# Patient Record
Sex: FEMALE | Race: ASIAN | NOT HISPANIC OR LATINO | Employment: UNEMPLOYED | URBAN - METROPOLITAN AREA
[De-identification: names, ages, dates, MRNs, and addresses within clinical notes are randomized per-mention and may not be internally consistent; named-entity substitution may affect disease eponyms.]

---

## 2023-08-06 ENCOUNTER — HOSPITAL ENCOUNTER (EMERGENCY)
Facility: HOSPITAL | Age: 33
Discharge: HOME | End: 2023-08-06
Attending: EMERGENCY MEDICINE | Admitting: EMERGENCY MEDICINE

## 2023-08-06 ENCOUNTER — APPOINTMENT (EMERGENCY)
Dept: CT IMAGING | Facility: HOSPITAL | Age: 33
End: 2023-08-06

## 2023-08-06 ENCOUNTER — HOSPITAL ENCOUNTER (EMERGENCY)
Facility: HOSPITAL | Age: 33
End: 2023-08-07
Attending: EMERGENCY MEDICINE | Admitting: EMERGENCY MEDICINE

## 2023-08-06 VITALS
WEIGHT: 161.6 LBS | SYSTOLIC BLOOD PRESSURE: 111 MMHG | HEIGHT: 65 IN | TEMPERATURE: 98.3 F | DIASTOLIC BLOOD PRESSURE: 63 MMHG | HEART RATE: 83 BPM | RESPIRATION RATE: 19 BRPM | OXYGEN SATURATION: 100 % | BODY MASS INDEX: 26.92 KG/M2

## 2023-08-06 DIAGNOSIS — Z00.8 ENCOUNTER FOR PSYCHOLOGICAL EVALUATION: Primary | ICD-10-CM

## 2023-08-06 DIAGNOSIS — F29 PSYCHOSIS (HCC): Primary | ICD-10-CM

## 2023-08-06 LAB
ALBUMIN SERPL BCP-MCNC: 4.2 G/DL (ref 3.5–5)
ALP SERPL-CCNC: 36 U/L (ref 34–104)
ALT SERPL W P-5'-P-CCNC: 23 U/L (ref 7–52)
AMPHETAMINES SERPL QL SCN: NEGATIVE
AMPHETAMINES SERPL QL SCN: NEGATIVE
ANION GAP SERPL CALCULATED.3IONS-SCNC: 6 MMOL/L
APAP SERPL-MCNC: <10 UG/ML (ref 10–20)
AST SERPL W P-5'-P-CCNC: 26 U/L (ref 13–39)
ATRIAL RATE: 73 BPM
BARBITURATES UR QL: NEGATIVE
BARBITURATES UR QL: NEGATIVE
BASOPHILS # BLD AUTO: 0.02 THOUSANDS/ÂΜL (ref 0–0.1)
BASOPHILS NFR BLD AUTO: 0 % (ref 0–1)
BENZODIAZ UR QL: NEGATIVE
BENZODIAZ UR QL: NEGATIVE
BILIRUB SERPL-MCNC: 0.84 MG/DL (ref 0.2–1)
BILIRUB UR QL STRIP: NEGATIVE
BUN SERPL-MCNC: 3 MG/DL (ref 5–25)
CALCIUM SERPL-MCNC: 8.6 MG/DL (ref 8.4–10.2)
CHLORIDE SERPL-SCNC: 109 MMOL/L (ref 96–108)
CLARITY UR: CLEAR
CO2 SERPL-SCNC: 25 MMOL/L (ref 21–32)
COCAINE UR QL: NEGATIVE
COCAINE UR QL: NEGATIVE
COLOR UR: COLORLESS
CREAT SERPL-MCNC: 0.87 MG/DL (ref 0.6–1.3)
EOSINOPHIL # BLD AUTO: 0.02 THOUSAND/ÂΜL (ref 0–0.61)
EOSINOPHIL NFR BLD AUTO: 0 % (ref 0–6)
ERYTHROCYTE [DISTWIDTH] IN BLOOD BY AUTOMATED COUNT: 14.1 % (ref 11.6–15.1)
ETHANOL EXG-MCNC: 0 MG/DL
ETHANOL SERPL-MCNC: <10 MG/DL
EXT PREGNANCY TEST URINE: NEGATIVE
EXT PREGNANCY TEST URINE: NEGATIVE
EXT. CONTROL: NORMAL
EXT. CONTROL: NORMAL
GFR SERPL CREATININE-BSD FRML MDRD: 88 ML/MIN/1.73SQ M
GLUCOSE SERPL-MCNC: 103 MG/DL (ref 65–140)
GLUCOSE UR STRIP-MCNC: NEGATIVE MG/DL
HCT VFR BLD AUTO: 37.9 % (ref 34.8–46.1)
HGB BLD-MCNC: 11.9 G/DL (ref 11.5–15.4)
HGB UR QL STRIP.AUTO: NEGATIVE
IMM GRANULOCYTES # BLD AUTO: 0.02 THOUSAND/UL (ref 0–0.2)
IMM GRANULOCYTES NFR BLD AUTO: 0 % (ref 0–2)
KETONES UR STRIP-MCNC: ABNORMAL MG/DL
LEUKOCYTE ESTERASE UR QL STRIP: NEGATIVE
LYMPHOCYTES # BLD AUTO: 1.69 THOUSANDS/ÂΜL (ref 0.6–4.47)
LYMPHOCYTES NFR BLD AUTO: 29 % (ref 14–44)
MCH RBC QN AUTO: 26.9 PG (ref 26.8–34.3)
MCHC RBC AUTO-ENTMCNC: 31.4 G/DL (ref 31.4–37.4)
MCV RBC AUTO: 86 FL (ref 82–98)
METHADONE UR QL: NEGATIVE
METHADONE UR QL: NEGATIVE
MONOCYTES # BLD AUTO: 0.46 THOUSAND/ÂΜL (ref 0.17–1.22)
MONOCYTES NFR BLD AUTO: 8 % (ref 4–12)
NEUTROPHILS # BLD AUTO: 3.68 THOUSANDS/ÂΜL (ref 1.85–7.62)
NEUTS SEG NFR BLD AUTO: 63 % (ref 43–75)
NITRITE UR QL STRIP: NEGATIVE
NRBC BLD AUTO-RTO: 0 /100 WBCS
OPIATES UR QL SCN: NEGATIVE
OPIATES UR QL SCN: NEGATIVE
OXYCODONE+OXYMORPHONE UR QL SCN: NEGATIVE
OXYCODONE+OXYMORPHONE UR QL SCN: NEGATIVE
P AXIS: 77 DEGREES
PCP UR QL: NEGATIVE
PCP UR QL: NEGATIVE
PH UR STRIP.AUTO: 6 [PH]
PLATELET # BLD AUTO: 210 THOUSANDS/UL (ref 149–390)
PMV BLD AUTO: 9.4 FL (ref 8.9–12.7)
POTASSIUM SERPL-SCNC: 3.2 MMOL/L (ref 3.5–5.3)
PR INTERVAL: 158 MS
PROT SERPL-MCNC: 6.7 G/DL (ref 6.4–8.4)
PROT UR STRIP-MCNC: NEGATIVE MG/DL
QRS AXIS: 91 DEGREES
QRSD INTERVAL: 78 MS
QT INTERVAL: 410 MS
QTC INTERVAL: 451 MS
RBC # BLD AUTO: 4.42 MILLION/UL (ref 3.81–5.12)
SALICYLATES SERPL-MCNC: <5 MG/DL (ref 3–20)
SODIUM SERPL-SCNC: 140 MMOL/L (ref 135–147)
SP GR UR STRIP.AUTO: 1 (ref 1–1.03)
T WAVE AXIS: 75 DEGREES
THC UR QL: POSITIVE
THC UR QL: POSITIVE
UROBILINOGEN UR STRIP-ACNC: <2 MG/DL
VENTRICULAR RATE: 73 BPM
WBC # BLD AUTO: 5.89 THOUSAND/UL (ref 4.31–10.16)

## 2023-08-06 PROCEDURE — 80053 COMPREHEN METABOLIC PANEL: CPT | Performed by: EMERGENCY MEDICINE

## 2023-08-06 PROCEDURE — 93005 ELECTROCARDIOGRAM TRACING: CPT

## 2023-08-06 PROCEDURE — 70450 CT HEAD/BRAIN W/O DYE: CPT

## 2023-08-06 PROCEDURE — 99285 EMERGENCY DEPT VISIT HI MDM: CPT | Performed by: EMERGENCY MEDICINE

## 2023-08-06 PROCEDURE — 0241U HB NFCT DS VIR RESP RNA 4 TRGT: CPT | Performed by: EMERGENCY MEDICINE

## 2023-08-06 PROCEDURE — 82077 ASSAY SPEC XCP UR&BREATH IA: CPT | Performed by: EMERGENCY MEDICINE

## 2023-08-06 PROCEDURE — 82075 ASSAY OF BREATH ETHANOL: CPT | Performed by: EMERGENCY MEDICINE

## 2023-08-06 PROCEDURE — 36415 COLL VENOUS BLD VENIPUNCTURE: CPT | Performed by: EMERGENCY MEDICINE

## 2023-08-06 PROCEDURE — 81003 URINALYSIS AUTO W/O SCOPE: CPT | Performed by: EMERGENCY MEDICINE

## 2023-08-06 PROCEDURE — 99285 EMERGENCY DEPT VISIT HI MDM: CPT

## 2023-08-06 PROCEDURE — 81025 URINE PREGNANCY TEST: CPT | Performed by: EMERGENCY MEDICINE

## 2023-08-06 PROCEDURE — 96372 THER/PROPH/DIAG INJ SC/IM: CPT

## 2023-08-06 PROCEDURE — 99284 EMERGENCY DEPT VISIT MOD MDM: CPT

## 2023-08-06 PROCEDURE — 80179 DRUG ASSAY SALICYLATE: CPT | Performed by: EMERGENCY MEDICINE

## 2023-08-06 PROCEDURE — 80307 DRUG TEST PRSMV CHEM ANLYZR: CPT | Performed by: EMERGENCY MEDICINE

## 2023-08-06 PROCEDURE — 93010 ELECTROCARDIOGRAM REPORT: CPT | Performed by: INTERNAL MEDICINE

## 2023-08-06 PROCEDURE — 85025 COMPLETE CBC W/AUTO DIFF WBC: CPT | Performed by: EMERGENCY MEDICINE

## 2023-08-06 PROCEDURE — 80143 DRUG ASSAY ACETAMINOPHEN: CPT | Performed by: EMERGENCY MEDICINE

## 2023-08-06 RX ORDER — OLANZAPINE 10 MG/1
10 INJECTION, POWDER, LYOPHILIZED, FOR SOLUTION INTRAMUSCULAR ONCE
Status: COMPLETED | OUTPATIENT
Start: 2023-08-07 | End: 2023-08-06

## 2023-08-06 RX ORDER — LORAZEPAM 2 MG/ML
2 INJECTION INTRAMUSCULAR ONCE
Status: COMPLETED | OUTPATIENT
Start: 2023-08-06 | End: 2023-08-06

## 2023-08-06 RX ORDER — POTASSIUM CHLORIDE 20 MEQ/1
40 TABLET, EXTENDED RELEASE ORAL ONCE
Status: COMPLETED | OUTPATIENT
Start: 2023-08-06 | End: 2023-08-06

## 2023-08-06 RX ORDER — DIPHENHYDRAMINE HYDROCHLORIDE 50 MG/ML
50 INJECTION INTRAMUSCULAR; INTRAVENOUS ONCE
Status: COMPLETED | OUTPATIENT
Start: 2023-08-06 | End: 2023-08-06

## 2023-08-06 RX ORDER — WATER 1000 ML/1000ML
INJECTION, SOLUTION INTRAVENOUS
Status: COMPLETED
Start: 2023-08-06 | End: 2023-08-06

## 2023-08-06 RX ORDER — OLANZAPINE 5 MG/1
10 TABLET, ORALLY DISINTEGRATING ORAL ONCE
Status: DISCONTINUED | OUTPATIENT
Start: 2023-08-07 | End: 2023-08-07 | Stop reason: HOSPADM

## 2023-08-06 RX ORDER — LORAZEPAM 2 MG/ML
2 INJECTION INTRAMUSCULAR ONCE
Status: COMPLETED | OUTPATIENT
Start: 2023-08-07 | End: 2023-08-06

## 2023-08-06 RX ORDER — DIPHENHYDRAMINE HYDROCHLORIDE 50 MG/ML
50 INJECTION INTRAMUSCULAR; INTRAVENOUS ONCE
Status: DISCONTINUED | OUTPATIENT
Start: 2023-08-07 | End: 2023-08-07 | Stop reason: HOSPADM

## 2023-08-06 RX ORDER — HALOPERIDOL 5 MG/ML
5 INJECTION INTRAMUSCULAR ONCE
Status: COMPLETED | OUTPATIENT
Start: 2023-08-06 | End: 2023-08-06

## 2023-08-06 RX ADMIN — OLANZAPINE 10 MG: 10 INJECTION, POWDER, FOR SOLUTION INTRAMUSCULAR at 23:58

## 2023-08-06 RX ADMIN — POTASSIUM CHLORIDE 40 MEQ: 1500 TABLET, EXTENDED RELEASE ORAL at 08:05

## 2023-08-06 RX ADMIN — LORAZEPAM 2 MG: 2 INJECTION INTRAMUSCULAR; INTRAVENOUS at 23:58

## 2023-08-06 RX ADMIN — LORAZEPAM 2 MG: 2 INJECTION INTRAMUSCULAR; INTRAVENOUS at 05:33

## 2023-08-06 RX ADMIN — HALOPERIDOL LACTATE 5 MG: 5 INJECTION, SOLUTION INTRAMUSCULAR at 05:33

## 2023-08-06 RX ADMIN — DIPHENHYDRAMINE HYDROCHLORIDE 50 MG: 50 INJECTION, SOLUTION INTRAMUSCULAR; INTRAVENOUS at 05:33

## 2023-08-06 RX ADMIN — WATER 10 ML: 1 INJECTION, SOLUTION INTRAMUSCULAR; INTRAVENOUS; SUBCUTANEOUS at 23:58

## 2023-08-06 NOTE — LETTER
[address] [facility] [address]  Dept: [phone]      EMTALA TRANSFER CONSENT    NAME Nathalie Montilla                                         1990                              MRN [id number]    I have been informed of my rights regarding examination, treatment, and transfer   by Dr. Diana Stephen MD    Benefits: Continuity of care    Risks: Potential for delay in receiving treatment      Consent for Transfer:  I acknowledge that my medical condition has been evaluated and explained to me by the emergency department physician or other qualified medical person and/or my attending physician, who has recommended that I be transferred to the service of  Accepting Physician: Dr. Ava Dooley at [address] [facility] [address] Name, [address] [facility] [address] : [address] [place] [address], [address] [place] [address]. The above potential benefits of such transfer, the potential risks associated with such transfer, and the probable risks of not being transferred have been explained to me, and I fully understand them. The doctor has explained that, in my case, the benefits of transfer outweigh the risks. I agree to be transferred. I authorize the performance of emergency medical procedures and treatments upon me in both transit and upon arrival at the receiving facility. Additionally, I authorize the release of any and all medical records to the receiving facility and request they be transported with me, if possible. I understand that the safest mode of transportation during a medical emergency is an ambulance and that the Hospital advocates the use of this mode of transport. Risks of traveling to the receiving facility by car, including absence of medical control, life sustaining equipment, such as oxygen, and medical personnel has been explained to me and I fully understand them.     (JASPER CORRECT BOX BELOW)  [  ]  I consent to the stated transfer and to be transported by ambulance/helicopter. [  ]  I consent to the stated transfer, but refuse transportation by ambulance and accept full responsibility for my transportation by car. I understand the risks of non-ambulance transfers and I exonerate the Hospital and its staff from any deterioration in my condition that results from this refusal.    X___________________________________________    DATE  23  TIME________  Signature of patient or legally responsible individual signing on patient behalf           RELATIONSHIP TO PATIENT_________________________          Provider Certification    NAME Óscar Minors                                         1990                              MRN [id number]    A medical screening exam was performed on the above named patient. Based on the examination:    Condition Necessitating Transfer The encounter diagnosis was Encounter for psychological evaluation. Patient Condition: The patient has been stabilized such that within reasonable medical probability, no material deterioration of the patient condition or the condition of the unborn child(freedom) is likely to result from the transfer    Reason for Transfer: Level of Care needed not available at this facility    Transfer Requirements: [facility] [address] available and qualified personnel available for treatment as acknowledged by Miquel Adler [phone]  Agreed to accept transfer and to provide appropriate medical treatment as acknowledged by       Dr. Adam Vaughn  Appropriate medical records of the examination and treatment of the patient are provided at the time of transfer   [address] [place] [address] Drive _______  Transfer will be performed by qualified personnel from [facility]  and appropriate transfer equipment as required, including the use of necessary and appropriate life support measures.     Provider Certification: I have examined the patient and explained the following risks and benefits of being transferred/refusing transfer to the patient/family:  General risk, such as traffic hazards, adverse weather conditions, rough terrain or turbulence, possible failure of equipment (including vehicle or aircraft), or consequences of actions of persons outside the control of the transport personnel      Based on these reasonable risks and benefits to the patient and/or the unborn child(freedom), and based upon the information available at the time of the patient’s examination, I certify that the medical benefits reasonably to be expected from the provision of appropriate medical treatments at another medical facility outweigh the increasing risks, if any, to the individual’s medical condition, and in the case of labor to the unborn child, from effecting the transfer.     X____________________________________________ DATE 08/07/23        TIME_______      ORIGINAL - SEND TO MEDICAL RECORDS   COPY - SEND WITH PATIENT DURING TRANSFER

## 2023-08-06 NOTE — ED NOTES
Pt cooperative with medication administration. Pt denies any needs at this time. Room stripped of all unnecessary items. Monitoring equipment remains in place due to pt not being medically cleared as of yet. 1:1 initiated for safety.      Harley Villalobos RN  08/06/23 0718

## 2023-08-06 NOTE — ED NOTES
Pt has begun to scream at wall and gesture wildly. Pt yelled "I've been waiting for this moment, those f*ckers have been killing me." Upon further questioning pt refuses to answer any questions. Provider made aware.      Myron Fan RN  08/06/23 6333

## 2023-08-06 NOTE — ED NOTES
CW attempted to speak w/pt. Pt appears to be sleeping soundly at this time. CW notes, 1:1 noted, pt woke up briefly, requested water and went right back to sleep. CW requested to be advised if pt wakes up.     TDS, CW
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
## 2023-08-06 NOTE — ED NOTES
Crisis at bedside, pt "in and out" and is unable to evaluate at this time.  RN will TT crisis when patient is awake      Kris Cordova RN  08/06/23 0094

## 2023-08-06 NOTE — ED RE-EVALUATION NOTE
Sleeping  Vital signs stable  Medically cleared  Awaiting crisis evaluation and placement     Stacy Perales MD  08/06/23 9492

## 2023-08-06 NOTE — ED NOTES
Pt drowsy, but awake.  Requesting to "go home" RN reviewed plan of care with patient, administered Potassium and pt then stated she "just wanted to sleep." 1:1 observer remains at bedside      Yasmin Elizondo, [address] [place] [address]  08/06/23 1177

## 2023-08-06 NOTE — ED NOTES
This writer discussed the patients current presentation and recommended discharge plan with . They agree with the patient being discharged at this time with referrals and/or information about outpatient mental health providers, D&A treatment providers, and shelters. The patient was Instructed to follow up with their PCP. The patient was provided with referral information for:   Additional outpatient drug and alcohol providers and facilities. The patient declined to complete a safety plan however a blank plan was provided for future use. In addition, the patient was instructed to call local Psychiatric hospital crisis, other crisis services, [facility] or to go to the nearest ER immediately if their situation changes at any time. This writer discussed discharge plans with the patient, who agrees with and understands the discharge plans.        SAFETY PLAN  Warning Signs (thoughts, images, mood, behavior, situations) of a potential crisis: situations w/strangers       Coping Skills (what can I do to take my mind off the problem, or to keep myself safe): defer to a positive outlet and or activity      Outside Support (who can I reach out to for support and help): Psychiatric hospital crisis and or NA       National Suicide Prevention Hotline:  [address] [place] [address] Drive [address]: [address] [facility] [address]: [address] [place] [address] [address] [phone] - Crisis   [phone] - Peer Support Talk Line (1-9pm daily)  [phone] - Teen Support Talk Line (1-9pm daily)  [address] [place] [address] [address] [place] [address] [address] [place] [address] [address] [place] [address]) [phone] - [facility] [facility]

## 2023-08-06 NOTE — ED RE-EVALUATION NOTE
Patient is awake and alert now. Evaluated by crisis, no grounds for involuntary commitment no grounds for removing the patient's rights. Patient reports that she took 12 Gummies that she believes were THC and also smoked some marijuana. Patient does not remember any violent activity last night. The patient is alert awake and cogent with linear thinking currently. I have no grounds to remove the patient's rights at this time. This advised discharge at this time. Crisis gave follow-up to the patient.      Miryam Reynolds MD  08/06/23 5813

## 2023-08-06 NOTE — ED NOTES
CW attempted to speak w/pt. Pt appears drowsy and  continuously requires verbal prompts to engage in conversation. Pt states, "Arriving to the ED from a house" and then continues to speak in native language. Pt reports living alone at this time and only uses THC in the morning time. Pt denies any hx of MH illness but does report having a psychiatrist "a long long time ago in [place]. I keep wanting to make appointment but I don't do it." Pt denies having any current medications and or OP therapy. Pt denies any hx of medical issues. Pt denies SI's / HI's. Pt states, "No I never want to hurt myself or anyone else." Pt reports "seeing people at times but not sure if they are trying to hurt me." Pt denies auditory hallucinations. Pt cannot provide any details on legal issues. Pt states, "I have a lot." Pt denies use of tobacco products and or ETOH. CW will attempt to speak w/pt again in aprox an hour. Pt was administered medications earlier.     TDS, CW

## 2023-08-07 ENCOUNTER — HOSPITAL ENCOUNTER (INPATIENT)
Facility: HOSPITAL | Age: 33
LOS: 22 days | Discharge: HOME | DRG: 751 | End: 2023-08-29
Attending: PSYCHIATRY & NEUROLOGY | Admitting: PSYCHIATRY & NEUROLOGY
Payer: COMMERCIAL

## 2023-08-07 VITALS
DIASTOLIC BLOOD PRESSURE: 72 MMHG | SYSTOLIC BLOOD PRESSURE: 120 MMHG | RESPIRATION RATE: 18 BRPM | TEMPERATURE: 98.1 F | HEART RATE: 90 BPM | OXYGEN SATURATION: 100 %

## 2023-08-07 DIAGNOSIS — F29 PSYCHOSIS, UNSPECIFIED PSYCHOSIS TYPE (HCC): ICD-10-CM

## 2023-08-07 DIAGNOSIS — R79.89 LOW VITAMIN D LEVEL: Primary | ICD-10-CM

## 2023-08-07 DIAGNOSIS — Z00.8 ENCOUNTER FOR PSYCHOLOGICAL EVALUATION: ICD-10-CM

## 2023-08-07 DIAGNOSIS — G47.00 INSOMNIA, UNSPECIFIED TYPE: ICD-10-CM

## 2023-08-07 LAB
FLUAV RNA RESP QL NAA+PROBE: NEGATIVE
FLUBV RNA RESP QL NAA+PROBE: NEGATIVE
RSV RNA RESP QL NAA+PROBE: NEGATIVE
SARS-COV-2 RNA RESP QL NAA+PROBE: NEGATIVE

## 2023-08-07 PROCEDURE — 99285 EMERGENCY DEPT VISIT HI MDM: CPT | Performed by: EMERGENCY MEDICINE

## 2023-08-07 RX ORDER — TRAZODONE HYDROCHLORIDE 50 MG/1
50 TABLET ORAL
Status: DISCONTINUED | OUTPATIENT
Start: 2023-08-07 | End: 2023-08-18

## 2023-08-07 RX ORDER — HYDROXYZINE HYDROCHLORIDE 25 MG/1
100 TABLET, FILM COATED ORAL
Status: CANCELLED | OUTPATIENT
Start: 2023-08-07

## 2023-08-07 RX ORDER — ACETAMINOPHEN 325 MG/1
975 TABLET ORAL EVERY 6 HOURS PRN
Status: DISCONTINUED | OUTPATIENT
Start: 2023-08-07 | End: 2023-08-29 | Stop reason: HOSPADM

## 2023-08-07 RX ORDER — LORAZEPAM 2 MG/ML
2 INJECTION INTRAMUSCULAR
Status: DISCONTINUED | OUTPATIENT
Start: 2023-08-07 | End: 2023-08-29 | Stop reason: HOSPADM

## 2023-08-07 RX ORDER — POLYETHYLENE GLYCOL 3350 17 G/17G
17 POWDER, FOR SOLUTION ORAL DAILY PRN
Status: CANCELLED | OUTPATIENT
Start: 2023-08-07

## 2023-08-07 RX ORDER — ACETAMINOPHEN 325 MG/1
650 TABLET ORAL EVERY 4 HOURS PRN
Status: DISCONTINUED | OUTPATIENT
Start: 2023-08-07 | End: 2023-08-29 | Stop reason: HOSPADM

## 2023-08-07 RX ORDER — AMOXICILLIN 250 MG
1 CAPSULE ORAL DAILY PRN
Status: DISCONTINUED | OUTPATIENT
Start: 2023-08-07 | End: 2023-08-27

## 2023-08-07 RX ORDER — LORAZEPAM 2 MG/ML
2 INJECTION INTRAMUSCULAR
Status: CANCELLED | OUTPATIENT
Start: 2023-08-07

## 2023-08-07 RX ORDER — DIPHENHYDRAMINE HYDROCHLORIDE 50 MG/ML
50 INJECTION INTRAMUSCULAR; INTRAVENOUS EVERY 6 HOURS PRN
Status: CANCELLED | OUTPATIENT
Start: 2023-08-07

## 2023-08-07 RX ORDER — HYDROXYZINE HYDROCHLORIDE 25 MG/1
25 TABLET, FILM COATED ORAL
Status: DISCONTINUED | OUTPATIENT
Start: 2023-08-07 | End: 2023-08-29 | Stop reason: HOSPADM

## 2023-08-07 RX ORDER — HYDROXYZINE HYDROCHLORIDE 25 MG/1
50 TABLET, FILM COATED ORAL
Status: CANCELLED | OUTPATIENT
Start: 2023-08-07

## 2023-08-07 RX ORDER — LORAZEPAM 2 MG/ML
1 INJECTION INTRAMUSCULAR
Status: CANCELLED | OUTPATIENT
Start: 2023-08-07

## 2023-08-07 RX ORDER — BENZTROPINE MESYLATE 1 MG/ML
0.5 INJECTION INTRAMUSCULAR; INTRAVENOUS
Status: CANCELLED | OUTPATIENT
Start: 2023-08-07

## 2023-08-07 RX ORDER — LANOLIN ALCOHOL/MO/W.PET/CERES
3 CREAM (GRAM) TOPICAL
Status: DISCONTINUED | OUTPATIENT
Start: 2023-08-07 | End: 2023-08-14

## 2023-08-07 RX ORDER — BENZTROPINE MESYLATE 1 MG/ML
1 INJECTION INTRAMUSCULAR; INTRAVENOUS
Status: DISCONTINUED | OUTPATIENT
Start: 2023-08-07 | End: 2023-08-29 | Stop reason: HOSPADM

## 2023-08-07 RX ORDER — HYDROXYZINE 50 MG/1
50 TABLET, FILM COATED ORAL
Status: DISCONTINUED | OUTPATIENT
Start: 2023-08-07 | End: 2023-08-29 | Stop reason: HOSPADM

## 2023-08-07 RX ORDER — HALOPERIDOL 5 MG/ML
2.5 INJECTION INTRAMUSCULAR
Status: CANCELLED | OUTPATIENT
Start: 2023-08-07

## 2023-08-07 RX ORDER — ACETAMINOPHEN 325 MG/1
650 TABLET ORAL EVERY 6 HOURS PRN
Status: CANCELLED | OUTPATIENT
Start: 2023-08-07

## 2023-08-07 RX ORDER — BISACODYL 10 MG
10 SUPPOSITORY, RECTAL RECTAL DAILY PRN
Status: DISCONTINUED | OUTPATIENT
Start: 2023-08-07 | End: 2023-08-08

## 2023-08-07 RX ORDER — HALOPERIDOL 5 MG/ML
5 INJECTION INTRAMUSCULAR
Status: CANCELLED | OUTPATIENT
Start: 2023-08-07

## 2023-08-07 RX ORDER — MAGNESIUM HYDROXIDE/ALUMINUM HYDROXICE/SIMETHICONE 120; 1200; 1200 MG/30ML; MG/30ML; MG/30ML
30 SUSPENSION ORAL EVERY 4 HOURS PRN
Status: DISCONTINUED | OUTPATIENT
Start: 2023-08-07 | End: 2023-08-29 | Stop reason: HOSPADM

## 2023-08-07 RX ORDER — BENZTROPINE MESYLATE 1 MG/ML
0.5 INJECTION INTRAMUSCULAR; INTRAVENOUS
Status: DISCONTINUED | OUTPATIENT
Start: 2023-08-07 | End: 2023-08-29 | Stop reason: HOSPADM

## 2023-08-07 RX ORDER — POTASSIUM CHLORIDE 20 MEQ/1
40 TABLET, EXTENDED RELEASE ORAL ONCE
Status: COMPLETED | OUTPATIENT
Start: 2023-08-07 | End: 2023-08-07

## 2023-08-07 RX ORDER — HALOPERIDOL 1 MG/1
2.5 TABLET ORAL
Status: CANCELLED | OUTPATIENT
Start: 2023-08-07

## 2023-08-07 RX ORDER — HALOPERIDOL 1 MG/1
1 TABLET ORAL EVERY 6 HOURS PRN
Status: CANCELLED | OUTPATIENT
Start: 2023-08-07

## 2023-08-07 RX ORDER — POLYETHYLENE GLYCOL 3350 17 G/17G
17 POWDER, FOR SOLUTION ORAL DAILY PRN
Status: DISCONTINUED | OUTPATIENT
Start: 2023-08-07 | End: 2023-08-08

## 2023-08-07 RX ORDER — MAGNESIUM HYDROXIDE/ALUMINUM HYDROXICE/SIMETHICONE 120; 1200; 1200 MG/30ML; MG/30ML; MG/30ML
30 SUSPENSION ORAL EVERY 4 HOURS PRN
Status: CANCELLED | OUTPATIENT
Start: 2023-08-07

## 2023-08-07 RX ORDER — HALOPERIDOL 5 MG/ML
5 INJECTION INTRAMUSCULAR
Status: DISCONTINUED | OUTPATIENT
Start: 2023-08-07 | End: 2023-08-29 | Stop reason: HOSPADM

## 2023-08-07 RX ORDER — BISACODYL 10 MG
10 SUPPOSITORY, RECTAL RECTAL DAILY PRN
Status: CANCELLED | OUTPATIENT
Start: 2023-08-07

## 2023-08-07 RX ORDER — LORAZEPAM 2 MG/ML
2 INJECTION INTRAMUSCULAR EVERY 6 HOURS PRN
Status: DISCONTINUED | OUTPATIENT
Start: 2023-08-07 | End: 2023-08-29 | Stop reason: HOSPADM

## 2023-08-07 RX ORDER — AMOXICILLIN 250 MG
1 CAPSULE ORAL DAILY PRN
Status: CANCELLED | OUTPATIENT
Start: 2023-08-07

## 2023-08-07 RX ORDER — HALOPERIDOL 5 MG/1
5 TABLET ORAL
Status: DISCONTINUED | OUTPATIENT
Start: 2023-08-07 | End: 2023-08-29 | Stop reason: HOSPADM

## 2023-08-07 RX ORDER — ACETAMINOPHEN 325 MG/1
650 TABLET ORAL EVERY 4 HOURS PRN
Status: CANCELLED | OUTPATIENT
Start: 2023-08-07

## 2023-08-07 RX ORDER — HALOPERIDOL 1 MG/1
1 TABLET ORAL EVERY 6 HOURS PRN
Status: DISCONTINUED | OUTPATIENT
Start: 2023-08-07 | End: 2023-08-29 | Stop reason: HOSPADM

## 2023-08-07 RX ORDER — BENZTROPINE MESYLATE 1 MG/1
1 TABLET ORAL
Status: CANCELLED | OUTPATIENT
Start: 2023-08-07

## 2023-08-07 RX ORDER — HYDROXYZINE 50 MG/1
100 TABLET, FILM COATED ORAL
Status: DISCONTINUED | OUTPATIENT
Start: 2023-08-07 | End: 2023-08-29 | Stop reason: HOSPADM

## 2023-08-07 RX ORDER — DIPHENHYDRAMINE HYDROCHLORIDE 50 MG/ML
50 INJECTION INTRAMUSCULAR; INTRAVENOUS EVERY 6 HOURS PRN
Status: DISCONTINUED | OUTPATIENT
Start: 2023-08-07 | End: 2023-08-29 | Stop reason: HOSPADM

## 2023-08-07 RX ORDER — HALOPERIDOL 5 MG/1
5 TABLET ORAL
Status: CANCELLED | OUTPATIENT
Start: 2023-08-07

## 2023-08-07 RX ORDER — LORAZEPAM 2 MG/ML
1 INJECTION INTRAMUSCULAR
Status: DISCONTINUED | OUTPATIENT
Start: 2023-08-07 | End: 2023-08-29 | Stop reason: HOSPADM

## 2023-08-07 RX ORDER — ACETAMINOPHEN 325 MG/1
650 TABLET ORAL EVERY 6 HOURS PRN
Status: DISCONTINUED | OUTPATIENT
Start: 2023-08-07 | End: 2023-08-29 | Stop reason: HOSPADM

## 2023-08-07 RX ORDER — HYDROXYZINE HYDROCHLORIDE 25 MG/1
25 TABLET, FILM COATED ORAL
Status: CANCELLED | OUTPATIENT
Start: 2023-08-07

## 2023-08-07 RX ORDER — TRAZODONE HYDROCHLORIDE 50 MG/1
50 TABLET ORAL
Status: CANCELLED | OUTPATIENT
Start: 2023-08-07

## 2023-08-07 RX ORDER — BENZTROPINE MESYLATE 1 MG/1
1 TABLET ORAL
Status: DISCONTINUED | OUTPATIENT
Start: 2023-08-07 | End: 2023-08-29 | Stop reason: HOSPADM

## 2023-08-07 RX ORDER — LANOLIN ALCOHOL/MO/W.PET/CERES
3 CREAM (GRAM) TOPICAL
Status: CANCELLED | OUTPATIENT
Start: 2023-08-07

## 2023-08-07 RX ORDER — HALOPERIDOL 5 MG/ML
2.5 INJECTION INTRAMUSCULAR
Status: DISCONTINUED | OUTPATIENT
Start: 2023-08-07 | End: 2023-08-29 | Stop reason: HOSPADM

## 2023-08-07 RX ORDER — LORAZEPAM 2 MG/ML
2 INJECTION INTRAMUSCULAR EVERY 6 HOURS PRN
Status: CANCELLED | OUTPATIENT
Start: 2023-08-07

## 2023-08-07 RX ORDER — BENZTROPINE MESYLATE 1 MG/ML
1 INJECTION INTRAMUSCULAR; INTRAVENOUS
Status: CANCELLED | OUTPATIENT
Start: 2023-08-07

## 2023-08-07 RX ORDER — ACETAMINOPHEN 325 MG/1
975 TABLET ORAL EVERY 6 HOURS PRN
Status: CANCELLED | OUTPATIENT
Start: 2023-08-07

## 2023-08-07 RX ADMIN — POTASSIUM CHLORIDE 40 MEQ: 1500 TABLET, EXTENDED RELEASE ORAL at 20:22
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
## 2023-08-07 NOTE — ED NOTES
Per Jonas Lassiter:   The chart says "The patient is cleared for psychiatric evaluation and psychiatric admission. " We need a drAime note that states, pt is medically cleared for inpatient psy treatment, or behavioral health admission."    TT sent to attending to request specific note be entered in pt chart.

## 2023-08-07 NOTE — ED NOTES
Pt started to become agitated, screaming loudly and sobbing. Staff attempted to redirect with no success. Provider aware.      Carlos Gaines RN  08/07/23 3721
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
## 2023-08-07 NOTE — ED NOTES
TT sent to Toshia Phillips, to inquire if pt is being reviewed for in-network placement. Per [place], pt under review at [facility].

## 2023-08-07 NOTE — ED NOTES
CW received call from pt's niece, Elva Preston. Elva Preston indicates pt and niece speak w/each other just about every 2-3 days. The last time Elva Preston heard from [facility] was on Saturday and pt was afraid her phone was being tapped and someone was chasing her. Elva Preston reports pt has no known hx of MH illness dx but MH illness does run in the family. Elva Preston and pt's siblings were discussing if they should make a trip to [place] to pick pt up. They do not feel pt living alone is a good idea. Pt's niece alleges pt moved to [place] apro 3 yrs ago from [place]. Pt does not reside w/anyone. Elva Preston can be reached at: [phone]. CW did advise Dana this writer could not provide any details as pt has not provided verbal consent to do so. CW will speak w/pt to give pt message niece called, contact # if pt wishes to return call, and inquire on consent to share info. .    Diane Mcfadden attempted to speak w/pt. Pt is currently in the bathroom.     TDS, CW

## 2023-08-07 NOTE — ED PROVIDER NOTES
History  Chief Complaint   Patient presents with   • Psychiatric Evaluation     Brought in by police after being found laying in the middle of the road. Patient reports being afraid "Sy[place] people are coming to shoot me". Patient states that they "just want to give up and die" due to not being able to go back to [place], or stay in the [address] for undisclosed reasons. 80-year-old female presenting for psychiatric evaluation. Patient presents in police custody with the police 440 petitioned. Patient was recently discharged from this facility after bizarre behavior, it was felt to be related to drug use at that time. Patient states that she left the custody of our hospital and continue to use drugs, she made suicidal comments to the police but she is not denying however she is noted to be hallucinating, with persecutory delusions, and overall exhibiting psychosis. None       Past Medical History:   Diagnosis Date   • Substance abuse ([address])        History reviewed. No pertinent surgical history. History reviewed. No pertinent family history. I have reviewed and agree with the history as documented. E-Cigarette/Vaping   • E-Cigarette Use Never User      E-Cigarette/Vaping Substances     Social History     Tobacco Use   • Smoking status: Never   • Smokeless tobacco: Never   Vaping Use   • Vaping Use: Never used   Substance Use Topics   • Alcohol use: Not Currently   • Drug use: Yes     Types: Marijuana     Comment: Pt states, "I'm not really sure what was in those gummies"       Review of Systems   Unable to perform ROS: Psychiatric disorder       Physical Exam  Physical Exam  Constitutional:       General: She is not in acute distress. Appearance: She is well-developed. She is not diaphoretic. HENT:      Head: Normocephalic and atraumatic. Eyes:      Pupils: Pupils are equal, round, and reactive to light. Neck:      Vascular: No JVD. Trachea: No tracheal deviation.    Cardiovascular: Rate and Rhythm: Normal rate and regular rhythm. Heart sounds: Normal heart sounds. No murmur heard. No friction rub. No gallop. Pulmonary:      Effort: Pulmonary effort is normal. No respiratory distress. Breath sounds: Normal breath sounds. No wheezing or rales. Abdominal:      General: Bowel sounds are normal. There is no distension. Palpations: Abdomen is soft. Tenderness: There is no abdominal tenderness. There is no guarding or rebound. Skin:     General: Skin is warm and dry. Coloration: Skin is not pale. Neurological:      Mental Status: She is alert and oriented to person, place, and time. Cranial Nerves: No cranial nerve deficit. Motor: No abnormal muscle tone. Psychiatric:         Mood and Affect: Mood is anxious. Affect is labile. Speech: Speech is rapid and pressured. Behavior: Behavior is agitated. Thought Content: Thought content is paranoid and delusional.         Cognition and Memory: Cognition is impaired. Judgment: Judgment is impulsive.          Vital Signs  ED Triage Vitals [08/06/23 2236]   Temperature Pulse Respirations Blood Pressure SpO2   97.7 °F (36.5 °C) 89 18 117/83 100 %      Temp Source Heart Rate Source Patient Position - Orthostatic VS BP Location FiO2 (%)   Temporal Monitor Lying Left arm --      Pain Score       --           Vitals:    08/06/23 2236   BP: 117/83   Pulse: 89   Patient Position - Orthostatic VS: Lying         Visual Acuity  Visual Acuity    Flowsheet Row Most Recent Value   L Pupil Size (mm) 3   R Pupil Size (mm) 3   L Pupil Shape Round   R Pupil Shape Round          ED Medications  Medications   OLANZapine (ZyPREXA ZYDIS) dispersible tablet 10 mg (0 mg Oral Hold 8/7/23 0001)   diphenhydrAMINE (BENADRYL) injection 50 mg (0 mg Intramuscular Hold 8/7/23 0001)   OLANZapine (ZyPREXA) IM injection 10 mg (10 mg Intramuscular Given 8/6/23 6265)   sterile water injection **ADS Override Pull** (10 mL  Given 8/6/23 2358)   LORazepam (ATIVAN) injection 2 mg (2 mg Intramuscular Given 8/6/23 2358)       Diagnostic Studies  Results Reviewed     Procedure Component Value Units Date/Time    FLU/RSV/COVID - if FLU/RSV clinically relevant [[id number]]  (Normal) Collected: 08/06/23 2318    Lab Status: Final result Specimen: Nares from Nose Updated: 08/07/23 0009     SARS-CoV-2 Negative     INFLUENZA A PCR Negative     INFLUENZA B PCR Negative     RSV PCR Negative    Narrative:      FOR PEDIATRIC PATIENTS - copy/paste COVID Guidelines URL to browser: [email or web address]    SARS-CoV-2 assay is a Nucleic Acid Amplification assay intended for the  qualitative detection of nucleic acid from SARS-CoV-2 in nasopharyngeal  swabs. Results are for the presumptive identification of SARS-CoV-2 RNA. Positive results are indicative of infection with SARS-CoV-2, the virus  causing COVID-19, but do not rule out bacterial infection or co-infection  with other viruses. Laboratories within the [facility] and its  territories are required to report all positive results to the appropriate  public health authorities. Negative results do not preclude SARS-CoV-2  infection and should not be used as the sole basis for treatment or other  patient management decisions. Negative results must be combined with  clinical observations, patient history, and epidemiological information. This test has not been FDA cleared or approved. This test has been authorized by FDA under an Emergency Use Authorization  (EUA). This test is only authorized for the duration of time the  declaration that circumstances exist justifying the authorization of the  emergency use of an in vitro diagnostic tests for detection of SARS-CoV-2  virus and/or diagnosis of COVID-19 infection under section 564(b)(1) of  the Act, 21 U. S.C. 639PTF-6(U)(5), unless the authorization is terminated  or revoked sooner.  The test has been validated but independent review by FDA  and CLIA is pending. Test performed using [employer] GeneXpert: This RT-PCR assay targets N2,  a region unique to SARS-CoV-2. A conserved region in the E-gene was chosen  for pan-Sarbecovirus detection which includes SARS-CoV-2. According to CMS-2020-01-R, this platform meets the definition of high-throughput technology. Rapid drug screen, urine [[id number]]  (Abnormal) Collected: 08/06/23 2318    Lab Status: Final result Specimen: Urine, Clean Catch Updated: 08/06/23 2338     Amph/Meth UR Negative     Barbiturate Ur Negative     Benzodiazepine Urine Negative     Cocaine Urine Negative     Methadone Urine Negative     Opiate Urine Negative     PCP Ur Negative     THC Urine Positive     Oxycodone Urine Negative    Narrative:      Presumptive report. If requested, specimen will be sent to reference lab for confirmation. FOR MEDICAL PURPOSES ONLY. IF CONFIRMATION NEEDED PLEASE CONTACT THE LAB WITHIN 5 DAYS. Drug Screen Cutoff Levels:  AMPHETAMINE/METHAMPHETAMINES  1000 ng/mL  BARBITURATES     200 ng/mL  BENZODIAZEPINES     200 ng/mL  COCAINE      300 ng/mL  METHADONE      300 ng/mL  OPIATES      300 ng/mL  PHENCYCLIDINE     25 ng/mL  THC       50 ng/mL  OXYCODONE      100 ng/mL    POCT pregnancy, urine [[id number]]  (Normal) Resulted: 08/06/23 2320    Lab Status: Final result Updated: 08/06/23 2321     EXT Preg Test, Ur Negative     Control Valid    POCT alcohol breath test [[id number]]  (Normal) Resulted: 08/06/23 2316    Lab Status: Final result Updated: 08/06/23 2316     EXTBreath Alcohol 0.00                 No orders to display              Procedures  Procedures         ED Course                               SBIRT 20yo+    Flowsheet Row Most Recent Value   Initial Alcohol Screen: US AUDIT-C     1. How often do you have a drink containing alcohol? 0 Filed at: 08/07/2023 0208   2.  How many drinks containing alcohol do you have on a typical day you are drinking? 0 Filed at: 08/07/2023 0208   3b. FEMALE Any Age, or MALE 65+: How often do you have 4 or more drinks on one occassion? 0 Filed at: 08/07/2023 0208   Audit-C Score 0 Filed at: 08/07/2023 0208   JORGE: How many times in the past year have you. .. Used an illegal drug or used a prescription medication for non-medical reasons? Weekly Filed at: 08/07/2023 0208   DAST-10: In the past 12 months. ..    1. Have you used drugs other than those required for medical reasons? 1 Filed at: 08/07/2023 0208   2. Do you use more than one drug at a time? 0 Filed at: 08/07/2023 0208   3. Have you had medical problems as a result of your drug use (e.g., memory loss, hepatitis, convulsions, bleeding, etc.)? 0 Filed at: 08/07/2023 0208   4. Have you had "blackouts" or "flashbacks" as a result of drug use? YesNo 1 Filed at: 08/07/2023 0208   5. Do you ever feel bad or guilty about your drug use? 0 Filed at: 08/07/2023 0208   6. Does your spouse (or parent) ever complain about your involvement with drugs? 0 Filed at: 08/07/2023 0208   7. Have you neglected your family because of your use of drugs? 0 Filed at: 08/07/2023 0208   8. Have you engaged in illegal activities in order to obtain drugs? 0 Filed at: 08/07/2023 0208   9. Have you ever experienced withdrawal symptoms (felt sick) when you stopped taking drugs? 0 Filed at: 08/07/2023 0208   10. Are you always able to stop using drugs when you want to? 0 Filed at: 08/07/2023 0208   DAST-10 Score 2 Filed at: 08/07/2023 0208                    Medical Decision Making  54-year-old female presents for psychiatric evaluation. Will check urine drug screen, alcohol reassess. Patient's urine drug screen positive only for THC, alcohol negative. While the drug screen is from exhaustive, patient's overall state including her paranoid delusions are much more consistent with primary psychiatric disease rather than drug-induced psychosis.   Clearly given the circumstances of her Rival in the emergency department both yesterday and today, she is unable to keep herself safe. It is my opinion that she is medically cleared at this point and is more likely to be exhibiting a primary psychosis, would recommend antipsychotic medication for sedation as needed, and will uphold 302, and would recommend that she continue emergent involuntary psychiatric care. Amount and/or Complexity of Data Reviewed  Labs: ordered. Risk  Prescription drug management. Disposition  Final diagnoses:   None     ED Disposition     None      Follow-up Information    None         Patient's Medications    No medications on file       No discharge procedures on file.     PDMP Review     None          ED Provider  Electronically Signed by           Erick Lyn MD  08/07/23 8931

## 2023-08-07 NOTE — ED NOTES
CW placed call to pt's niece to provide w/updates. As per Amado Burden, "Pt had called her on Friday and did tell her men were chasing her. I did hear her arguing w/two male voices. I asked her today if she would like to come home and she told me no because the family will not be safe then." Amado Burden notes, "I did get a call from someone in [address] today and they just laughed on the line. I also got another call from [place] and the same thing happened. I really don't know if she is in some kind of trouble and how much of this is true or part of an illness." CW advised Kristenjulio Haines will call w/accepting facility information once available. Amado Burden is appreciative for the information.     TDS, CW

## 2023-08-07 NOTE — ED NOTES
CW attempted to speak w/pt. Pt appears to be sleeping soundly at this time. CW requested to be alerted once pt is awake to complete assessment.     TDS, CW

## 2023-08-07 NOTE — ED NOTES
Pt is not medically cleared at this time. As per Dr Sami Brandt, pt will likely be medically cleared for AM CW to see her. As per the 302 petitioned by Rian Carson, "I, [place] observed [place] to be laying in the middle of the road. As I approached Kiera Lundberg, she advised me to "shoot" her and she was ready to die."    This is the 3rd ED visit by pt in as many days, including once earlier today @ [facility], as well as 2 days ago @ [facility]. Both previous times pt was discharged. As per the current 302 petition, pt is now stating she wants to die.     Gala Palomino, CIS ll  08/06/23 23:15

## 2023-08-07 NOTE — ED NOTES
CW provided pt w/ED phone to place call and speak w/niece. CW dialed phone for pt and provided Jonelle St. [place] and Dustin w/phone. Jonelle [place] and Dustin did provide verbal consent to this writer to share details w/Sacheed about pt's current situation. Pt states, "I will tell them I have been having chest pains but I don't want to tell them about anything else.  I give you permission to share w/Sacheeda everything that's been going on."    TDS, CW

## 2023-08-07 NOTE — ED CARE HANDOFF
Emergency Department Sign Out Note        Sign out and transfer of care from Dr. Logan Mcmahan. See Separate Emergency Department note. The patient, Brandi White, was evaluated by the previous provider for psychiatric evaluation. Workup Completed:  Full    ED Course / Workup Pending (followup): The patient is cleared for psychiatric evaluation and psychiatric admission. Procedures  MDM        Disposition  Final diagnoses:   None     ED Disposition     None      Follow-up Information    None       Patient's Medications    No medications on file     No discharge procedures on file.        ED Provider  Electronically Signed by     Marcelina Silva DO  08/07/23 1103

## 2023-08-07 NOTE — ED NOTES
CW checked in w/[facility] regarding review of clinicals. As per [facility], NO updates at this time.     TDS, CW

## 2023-08-08 PROBLEM — Z00.8 MEDICAL CLEARANCE FOR PSYCHIATRIC ADMISSION: Status: ACTIVE | Noted: 2023-08-08

## 2023-08-08 PROBLEM — F12.10 MILD TETRAHYDROCANNABINOL (THC) ABUSE: Status: ACTIVE | Noted: 2023-08-08

## 2023-08-08 PROBLEM — F29 PSYCHOSIS (HCC): Status: ACTIVE | Noted: 2023-08-08

## 2023-08-08 LAB
25(OH)D3 SERPL-MCNC: 15.2 NG/ML (ref 30–100)
ALBUMIN SERPL BCP-MCNC: 4.4 G/DL (ref 3.5–5)
ALP SERPL-CCNC: 38 U/L (ref 34–104)
ALT SERPL W P-5'-P-CCNC: 38 U/L (ref 7–52)
ANION GAP SERPL CALCULATED.3IONS-SCNC: 8 MMOL/L
AST SERPL W P-5'-P-CCNC: 42 U/L (ref 13–39)
BASOPHILS # BLD AUTO: 0.02 THOUSANDS/ÂΜL (ref 0–0.1)
BASOPHILS NFR BLD AUTO: 1 % (ref 0–1)
BILIRUB SERPL-MCNC: 1.22 MG/DL (ref 0.2–1)
BUN SERPL-MCNC: 7 MG/DL (ref 5–25)
CALCIUM SERPL-MCNC: 9.2 MG/DL (ref 8.4–10.2)
CHLORIDE SERPL-SCNC: 103 MMOL/L (ref 96–108)
CHOLEST SERPL-MCNC: 242 MG/DL
CO2 SERPL-SCNC: 28 MMOL/L (ref 21–32)
CREAT SERPL-MCNC: 0.86 MG/DL (ref 0.6–1.3)
EOSINOPHIL # BLD AUTO: 0.05 THOUSAND/ÂΜL (ref 0–0.61)
EOSINOPHIL NFR BLD AUTO: 1 % (ref 0–6)
ERYTHROCYTE [DISTWIDTH] IN BLOOD BY AUTOMATED COUNT: 13.7 % (ref 11.6–15.1)
FOLATE SERPL-MCNC: >22.3 NG/ML
GFR SERPL CREATININE-BSD FRML MDRD: 89 ML/MIN/1.73SQ M
GLUCOSE SERPL-MCNC: 118 MG/DL (ref 65–140)
HCT VFR BLD AUTO: 44 % (ref 34.8–46.1)
HDLC SERPL-MCNC: 59 MG/DL
HGB BLD-MCNC: 14 G/DL (ref 11.5–15.4)
IMM GRANULOCYTES # BLD AUTO: 0.01 THOUSAND/UL (ref 0–0.2)
IMM GRANULOCYTES NFR BLD AUTO: 0 % (ref 0–2)
LDLC SERPL CALC-MCNC: 166 MG/DL (ref 0–100)
LYMPHOCYTES # BLD AUTO: 1.37 THOUSANDS/ÂΜL (ref 0.6–4.47)
LYMPHOCYTES NFR BLD AUTO: 35 % (ref 14–44)
MCH RBC QN AUTO: 27.4 PG (ref 26.8–34.3)
MCHC RBC AUTO-ENTMCNC: 31.8 G/DL (ref 31.4–37.4)
MCV RBC AUTO: 86 FL (ref 82–98)
MONOCYTES # BLD AUTO: 0.41 THOUSAND/ÂΜL (ref 0.17–1.22)
MONOCYTES NFR BLD AUTO: 11 % (ref 4–12)
NEUTROPHILS # BLD AUTO: 2.05 THOUSANDS/ÂΜL (ref 1.85–7.62)
NEUTS SEG NFR BLD AUTO: 52 % (ref 43–75)
NONHDLC SERPL-MCNC: 183 MG/DL
NRBC BLD AUTO-RTO: 0 /100 WBCS
PLATELET # BLD AUTO: 220 THOUSANDS/UL (ref 149–390)
PMV BLD AUTO: 9.5 FL (ref 8.9–12.7)
POTASSIUM SERPL-SCNC: 3.6 MMOL/L (ref 3.5–5.3)
PROT SERPL-MCNC: 7 G/DL (ref 6.4–8.4)
RBC # BLD AUTO: 5.11 MILLION/UL (ref 3.81–5.12)
SODIUM SERPL-SCNC: 139 MMOL/L (ref 135–147)
TRIGL SERPL-MCNC: 87 MG/DL
TSH SERPL DL<=0.05 MIU/L-ACNC: 2.13 UIU/ML (ref 0.45–4.5)
VIT B12 SERPL-MCNC: 757 PG/ML (ref 180–914)
WBC # BLD AUTO: 3.91 THOUSAND/UL (ref 4.31–10.16)

## 2023-08-08 PROCEDURE — 84443 ASSAY THYROID STIM HORMONE: CPT | Performed by: PSYCHIATRY & NEUROLOGY

## 2023-08-08 PROCEDURE — 99223 1ST HOSP IP/OBS HIGH 75: CPT | Performed by: PSYCHIATRY & NEUROLOGY

## 2023-08-08 PROCEDURE — 80053 COMPREHEN METABOLIC PANEL: CPT | Performed by: PSYCHIATRY & NEUROLOGY

## 2023-08-08 PROCEDURE — 82746 ASSAY OF FOLIC ACID SERUM: CPT | Performed by: PSYCHIATRY & NEUROLOGY

## 2023-08-08 PROCEDURE — 82607 VITAMIN B-12: CPT | Performed by: PSYCHIATRY & NEUROLOGY

## 2023-08-08 PROCEDURE — 85025 COMPLETE CBC W/AUTO DIFF WBC: CPT | Performed by: PSYCHIATRY & NEUROLOGY

## 2023-08-08 PROCEDURE — 80061 LIPID PANEL: CPT | Performed by: PSYCHIATRY & NEUROLOGY

## 2023-08-08 PROCEDURE — 82306 VITAMIN D 25 HYDROXY: CPT | Performed by: PSYCHIATRY & NEUROLOGY

## 2023-08-08 PROCEDURE — 99253 IP/OBS CNSLTJ NEW/EST LOW 45: CPT | Performed by: NURSE PRACTITIONER

## 2023-08-08 RX ORDER — OLANZAPINE 5 MG/1
5 TABLET, ORALLY DISINTEGRATING ORAL
Status: DISCONTINUED | OUTPATIENT
Start: 2023-08-08 | End: 2023-08-09

## 2023-08-08 RX ORDER — BISACODYL 10 MG
10 SUPPOSITORY, RECTAL RECTAL DAILY PRN
Status: DISCONTINUED | OUTPATIENT
Start: 2023-08-08 | End: 2023-08-29 | Stop reason: HOSPADM

## 2023-08-08 RX ORDER — POLYETHYLENE GLYCOL 3350 17 G/17G
17 POWDER, FOR SOLUTION ORAL DAILY PRN
Status: DISCONTINUED | OUTPATIENT
Start: 2023-08-08 | End: 2023-08-29 | Stop reason: HOSPADM

## 2023-08-08 RX ADMIN — BENZTROPINE MESYLATE 1 MG: 1 INJECTION INTRAMUSCULAR; INTRAVENOUS at 04:22

## 2023-08-08 RX ADMIN — HALOPERIDOL 5 MG: 5 TABLET ORAL at 20:07

## 2023-08-08 RX ADMIN — HYDROXYZINE HYDROCHLORIDE 50 MG: 50 TABLET, FILM COATED ORAL at 20:08

## 2023-08-08 RX ADMIN — LORAZEPAM 2 MG: 2 INJECTION INTRAMUSCULAR; INTRAVENOUS at 04:22

## 2023-08-08 RX ADMIN — TRAZODONE HYDROCHLORIDE 50 MG: 50 TABLET ORAL at 21:33

## 2023-08-08 RX ADMIN — HALOPERIDOL LACTATE 5 MG: 5 INJECTION, SOLUTION INTRAMUSCULAR at 04:22

## 2023-08-08 RX ADMIN — OLANZAPINE 5 MG: 5 TABLET, ORALLY DISINTEGRATING ORAL at 21:29

## 2023-08-08 RX ADMIN — Medication 3 MG: at 21:29

## 2023-08-08 NOTE — H&P
Psychiatric Evaluation - [address] 28 y.o. female MRN: [id number]  Unit/Bed#: [email or web address] 342-01 Encounter: [id number]    Assessment/Plan   Principal Problem:    Psychosis ([facility]) rule out schizophrenia versus schizoaffective disorder  Active Problems:    Medical clearance for psychiatric admission    Mild tetrahydrocannabinol (THC) abuse    Plan:   • Patient is currently admitted as an involuntary 302 commitment  • Plan for the following psychopharmacologic changes:  o Initiate 5 mg Zyprexa Zydis  o Mouth checks   • Encourage group, occupational, and milieu therapy. • Collaborate with case management for disposition planning  • Discuss with family for baseline assessment and disposition planning. • Admission labs reviewed  • Medicine consulted for medical clearance and further medical management as indicated    Treatment options and alternatives were reviewed with the patient, who concurs with the above plan. Risks, benefits, and possible side effects of medications were explained to the patient, who verbalizes understanding.        -----------------------------------    Chief Complaint: "past few days has nothing to do with why I'm here. It all started with my boyfriend"     History of Present Illness     Marce Whittaker is a 28 y.o. female with no pertinent past psychiatric history who presents involuntarily on a 36 commitment with bizarre behaviors, paranoid ideation, ideas of reference and found wandering the streets. Per CW note, 302 stated when patient was found, she reportedly stated she was ready to die and advised to shoot her. Of note, patient also had multiple previous ED visits for psychotic symptoms and AMS. In the ED, patient was seen screaming at the wall and gesturing, with limited cooperation with assessment questions. Per CW note, patient reported seeing a psychiatrist "a long time ago in [place]".   Patient also reported "seeing people at times but not sure if they are trying to hurt me".  Patient also says she has "a lot" of legal issues. Per CW note, patient's niece reported speaking with patient on Saturday and patient was paranoid about her phones being tapped. Symptoms prior to admission included erratic behavior, bizarre behavior, paranoid ideation, delusional thoughts, drug abuse, poor memory and poor self-care. On evaluation, patient appears to be a limited historian and appears to have impaired recent memory due to acute psychosis and recent substance use. She had unkempt hair and marginal grooming/hygiene. She was difficult to follow and is noted to be tangential and disorganized in thought process with loose associations. She is blunted in affect and has seen inappropriately smiling/laughing during interview. Patient was also distractible during the interview and often dismissive during the assessment. She is grandiose at times stating she needs to "start a [employer] business" and later a "[employer]" and she is feeling "stronger and stronger" with each day. When asked what brought her to the ED, patient reported that "the past few days has nothing to do with why I'm here. It all started my boyfriend" and expressed paranoid ideation regarding "[place] people" and her boyfriend trying to work her as a "slave" in [place] in a [employer]. Patient was also guarded at times. Patient reports she does not have any family in the area. When asked where her family is, patient stated "I can't tell you that" refused to provide further information. Patient reports using 16-20 Gummies that two friends brought over to her house and states they had additional drugs in them, though she is unsure specifically what. She also endorses "vaping" prior to admission. She reports she has known these friends for about 1 month. Per ED documentation, patient reportedly was not sleeping for several days though denied at time of interview and states she typically gets 7-8 hours of sleep.   She reports an adequate appetite. Patient describes her energy as "so-so". Described her mood as "paranoid". She also reported "whenever I eat sushi I feel different". Patient denies SI/HI/AVH. Of note, when patient was asked about VH she had a smirk on her face. She denies previous psych history or previously seeing a psychiatrist.  She reports her father's best friend is a therapist who she checks in with as needed. Please see documentation from the emergency department reproduced below for further details about initial presentation. Per emergency department provider Florence Artis MD on 08/06/23:  "28-year-old female brought in by EMS after police called when they found patient wandering the streets and acting erratically. She reports not sleeping for several days. She states that she is worried that people are attacking her on social media. She reports staying with 2 men in her residence and Guillermo prescott la Cruz, does not give their names. She was seen at [facility] yesterday for erratic behavior, related to smoking marijuana and using mushrooms that time, denies taking any drugs since then. Denies HI/SI."    Per Crisis worker Dewitt Castillo on 08/26/23:  "Pt presents to the ED via EMS from home. Pt reports having lived previously in [place], moved to [place], and resides alone in house. Pt denies any hx of MH illness. Pt denies SI's / HI's and or AVH's. Pt confirms having had some THC (smoked) and then was given gummies but unsure of what may have been in them. Pt states, "Never again I cannot do that." Pt reports good sleep and fair appetite. Pt denies any hx of MH tx and or any medical issues. Pt alleges, "Two men had stayed with me and they were the ones who gave me the marijuana and the gummies." Pt denies men are currently still at the home and pt denies any safety concerns or issues. Pt does not appear to meet criteria for IP tx at this time.  Pt is calm, cooperative, maintains good eye contact and engages in conversation. Pt does not appear to be responding to internal stimuli and or experiencing any hallucinations at this time. CW and pt did discuss IP tx vs going home. Pt appeared relieved and appreciative to be provided w/the option to return home at this time. CW provided pt w/OP referrals. Pt is thankful and again states, "I will never do that stuff anymore, that was the last time." CW provided Dr. Sue Lanier w/details and updates. Dr. Sue Lanier will d/c pt home at this time. "    Medical Review Of Systems:  Complete review of systems is negative except as noted above. Psychiatric Review Of Systems:  Sleep: reports increased. Per ED documentation, patient reported she had not slept for 'several days'  Interest/Anhedonia:  Denies  Guilt/hopeless: Yes, due to worrying her neighbors and family regarding this admission  Low energy/anergy: "so-so"  Poor Concentration: denies  Appetite changes: Denies.  Per chart, patient had decreased appetite  Weight changes: reports unknown  Somatic symptoms: none verbalized   Anxiety/panic: Denies initially then reports having anxiety due to her paranoia  Adelina: denies  Self injurious behavior/risky behavior: denies  Trauma: no  Hallucinations: Denies   Suicidal Ideation: Denies  Homicidal Ideation: Denies     Historical Information     Psychiatric History:   Inpatient hospitalizations: patient denies  Suicide attempts: patient denies  Self injurious behavior: denies   Violent behavior: unknown  Outpatient treatment: denies; reports dad's best friend is a counselor/therapist who she checks in with as needed   Psychiatric medication trial: denies   Eating Disorder:Denies  OCD:Denies    Substance Abuse History:  Social History     Tobacco Use   • Smoking status: Never   • Smokeless tobacco: Never   Vaping Use   • Vaping Use: Never used   Substance Use Topics   • Alcohol use: Not Currently   • Drug use: Yes     Types: Marijuana, Psilocybin     Comment: Pt states, "I'm not really sure what was in those gummies"      I have assessed this patient for substance use within the past 12 months. UDS on admission positive for THC. Cannabinoid level on 08/04/23 > 750. Ethanol level < 10. Patient endorses using 1 blunt of marijuana a week. She reports using marijuana 1-2 times a week. She reports taking approximately 16-20 Gummies prior to admission as well as thinks she possibly had unintentional other drugs but does not know specifically what. Family Psychiatric History:   No other known family history of psychiatric illness, suicide attempt or substance abuse. Social History:  Education: associates degree in business mgmt  Learning Disabilities: unknown  Marital history: single  Children: denies   Living arrangement: Lives alone  Occupational History: unemployed as of April per patient. Reports previously was a  at [employer] in [facility]"  Functioning Relationships: reports her neighbors are support;  Family is primarily in [place]   Access to Firearms: Denies   Other Pertinent History: denies  or legal history       Traumatic History:   Abuse: denies emotional, physical, or emotional abuse  Other Traumatic Events: unknown    Past Medical History:   Seizure history: Denies   Head injury: denies   Past Medical History:   Diagnosis Date   • Psychosis ([address])    • Substance abuse ([address])         -----------------------------------  Objective    Temp:  [97.9 °F (36.6 °C)-98.1 °F (36.7 °C)] 97.9 °F (36.6 °C)  HR:  [72-95] 72  Resp:  [18] 18  BP: (120-127)/(70-74) 127/74    Mental Status Evaluation:      Appearance:  age appropriate, marginal hygiene, wearing hospital clothes, Unkempt hair   Behavior:  cooperative, calm, restless and fidgety, guarded, evasive and dismissive at times   Speech:  normal rate, normal volume, clear   Mood:  "Paranoid"   Affect:  blunted, inappropriate laughing/smiling at times   Thought Process:  disorganized, tangential   Associations: loose associations   Thought Content:  paranoid ideation, grandiose ideas   Perceptual Disturbances: denies auditory or visual hallucinations when asked; appears internally preoccupied and distracted at times; seen swelling/laughing inappropriately multiple times during interview   Risk Potential: Suicidal ideation - Denies  Homicidal ideation - Denies  Potential for aggression - Yes, due to acute psychosis   Sensorium:  oriented to person and situation   Memory:  Recent memory appears to be impaired due to acute psychosis and recent substance use   Consciousness:  alert and awake   Attention/Concentration: decreased attention span   Insight:  poor   Judgment: poor   Muscle Strength:  Moving all 4 extremities spontaneously     Gait/Station: normal gait/station   Motor Activity: no abnormal movements     Meds/Allergies   No Known Allergies  all current active meds have been reviewed    Behavioral Health Medications: all current active meds have been reviewed. Changes as above. Laboratory results:  I have personally reviewed all pertinent laboratory/tests results.   Recent Results (from the past 48 hour(s))   POCT alcohol breath test    Collection Time: 08/06/23 11:16 PM   Result Value Ref Range    EXTBreath Alcohol 0.00    Rapid drug screen, urine    Collection Time: 08/06/23 11:18 PM   Result Value Ref Range    Amph/Meth UR Negative Negative    Barbiturate Ur Negative Negative    Benzodiazepine Urine Negative Negative    Cocaine Urine Negative Negative    Methadone Urine Negative Negative    Opiate Urine Negative Negative    PCP Ur Negative Negative    THC Urine Positive (A) Negative    Oxycodone Urine Negative Negative   FLU/RSV/COVID - if FLU/RSV clinically relevant    Collection Time: 08/06/23 11:18 PM    Specimen: Nose; Nares   Result Value Ref Range    SARS-CoV-2 Negative Negative    INFLUENZA A PCR Negative Negative    INFLUENZA B PCR Negative Negative    RSV PCR Negative Negative   POCT pregnancy, urine Collection Time: 08/06/23 11:20 PM   Result Value Ref Range    EXT Preg Test, Ur Negative     Control Valid    CBC and differential    Collection Time: 08/08/23 11:48 AM   Result Value Ref Range    WBC 3.91 (L) 4.31 - 10.16 Thousand/uL    RBC 5.11 3.81 - 5.12 Million/uL    Hemoglobin 14.0 11.5 - 15.4 g/dL    Hematocrit 44.0 34.8 - 46.1 %    MCV 86 82 - 98 fL    MCH 27.4 26.8 - 34.3 pg    MCHC 31.8 31.4 - 37.4 g/dL    RDW 13.7 11.6 - 15.1 %    MPV 9.5 8.9 - 12.7 fL    Platelets 453 616 - 736 Thousands/uL    nRBC 0 /100 WBCs    Neutrophils Relative 52 43 - 75 %    Immat GRANS % 0 0 - 2 %    Lymphocytes Relative 35 14 - 44 %    Monocytes Relative 11 4 - 12 %    Eosinophils Relative 1 0 - 6 %    Basophils Relative 1 0 - 1 %    Neutrophils Absolute 2.05 1.85 - 7.62 Thousands/µL    Immature Grans Absolute 0.01 0.00 - 0.20 Thousand/uL    Lymphocytes Absolute 1.37 0.60 - 4.47 Thousands/µL    Monocytes Absolute 0.41 0.17 - 1.22 Thousand/µL    Eosinophils Absolute 0.05 0.00 - 0.61 Thousand/µL    Basophils Absolute 0.02 0.00 - 0.10 Thousands/µL   TSH, 3rd generation with Free T4 reflex    Collection Time: 08/08/23 11:48 AM   Result Value Ref Range    TSH 3RD GENERATON 2.129 0.450 - 4.500 uIU/mL        Imaging Studies: CT Head on 08/06: No acute intracranial abnormalities     Code Status: Level 1 - Full Code  Advance Directive and Living Will: <no information>    The following interventions are recommended: behavioral checks every 7 minutes, continued hospitalization on locked unit     Assessment/Plan   Principal Problem:    Psychosis ([address]) rule out schizophrenia versus schizoaffective disorder  Active Problems:    Medical clearance for psychiatric admission    Mild tetrahydrocannabinol (THC) abuse      _________________          Patient Strengths: ability for insight, negotiates basic needs, stable housing, work skills     Patient Barriers: relationship issues, patient is on an involuntary commitment, substance abuse    -----------------------------------    Risks / Benefits of Treatment:     Risks, benefits, and possible side effects of medications explained to patient and patient verbalizes understanding. Counseling / Coordination of Care:     Patient's presentation on admission and proposed treatment plan were discussed with the treatment team.  Diagnosis, medication changes and treatment plan were reviewed with the patient. Recent stressors and events leading to admission were discussed with the patient. Importance of medication and treatment compliance was reviewed with the patient. Inpatient Psychiatric Certification:      Certification: Based upon physical, mental and social evaluations, I certify that inpatient psychiatric services are medically necessary for this patient for a duration of 10 midnights for the treatment of Psychosis [facility])    Available alternative community resources do not meet the patient's mental health care needs. I further attest that an established written individualized plan of care has been implemented and is outlined in the patient's medical records. This note has been constructed using a voice recognition system. There may be translation, syntax, or grammatical errors. If you have any questions, please contact the dictating provider.     Eda Anderson, DO  PGY-2 Psychiatry Resident

## 2023-08-08 NOTE — TREATMENT TEAM
08/08/23 1300   Activity/Group Checklist   Group   (recovery group)   Attendance Attended   Attendance Duration (min) 16-30   Interactions Interacted appropriately   Affect/Mood Appropriate   Goals Achieved Discussed coping strategies; Discussed self-esteem issues; Able to listen to others; Able to engage in interactions

## 2023-08-08 NOTE — PLAN OF CARE
Problem: SAFETY, RESTRAINT - VIOLENT/SELF-DESTRUCTIVE  Goal: Remains free of harm/injury from restraints (Restraint for Violent/Self-Destructive Behavior)  Description: INTERVENTIONS:  - Instruct patient/family regarding restraint use   - Assess and monitor physiologic and psychological status   - Provide interventions and comfort measures to meet assessed patient needs   - Ensure continuous in person monitoring is provided   - Identify and implement measures to help patient regain control  - Assess readiness for release of restraint  Outcome: Not Progressing     Problem: SAFETY, RESTRAINT - VIOLENT/SELF-DESTRUCTIVE  Goal: Returns to optimal restraint-free functioning  Description: INTERVENTIONS:  - Assess the patient's behavior and symptoms that indicate continued need for restraint  - Identify and implement measures to help patient regain control  - Assess readiness for release of restraint   Outcome: Not Progressing

## 2023-08-08 NOTE — ED NOTES
Per Zora Cohen, INTAKE:   The unit nurse is asking about the potassium level for Michelle Villarreal if anything was done for that. TT sent to pt RN to request potassium be given to pt to normalize. Pt sent request to attending.

## 2023-08-08 NOTE — NURSING NOTE
Patient had asked for multiple cups of water throughout shift, which was later found patient dumped water on her bed, and all over her room in the seclusion room. Patient had asked earlier prior to the dumping, "Do I have to sleep there?" to which the patient was informed she can sleep in her bedroom, or in the small tv room if she feels uncomfortable. Patient is bizarre, and paranoid asking to speak to the doctor in regards to if she can be released today; preoccupied with discharge.

## 2023-08-08 NOTE — CONSULTS
[facility]  Consult  Name: Sangeeta Sanderson 28 y.o. female I MRN: [id number]  Unit/Bed#: [address] [place] [address] [id number] I Date of Admission: 8/7/2023   Date of Service: 8/8/2023 I Hospital Day: 1    Inpatient consult for Medical Clearance for [facility] patient  Consult performed by: MARY Griffin  Consult ordered by: Murali Pineda MD          Assessment/Plan   Medical clearance for psychiatric admission  Assessment & Plan  · Patient's vital signs stable afebrile patient seen and examined by myself today labs from 8/8/2023 have not been yet collected however labs from 8/6/2023 in the ER reviewed sodium 140 potassium 3.2 which was repleted creatinine 0.87U tox positive for [facility]  · Patient medically cleared for admit  · SL IM will sign off please call with any questions or concerns    Mild tetrahydrocannabinol [facility]) abuse  Assessment & Plan  · Patient's U tox on 8/6/2023 at [facility] ER positive for [facility] in her urine  · Patient presented to [address] [place] [address] 8/4/2023 having THC stating that she had smoked weed and took 16 Gummies  · THC cessation education        Counseling / Coordination of Care Time: 45 minutes. Greater than 50% of total time spent on patient counseling and coordination of care. Collaboration of Care: Were Recommendations Directly Discussed with Primary Treatment Team? - No     History of Present Illness:    Sangeeta Sanderson is a 28 y.o. female who is originally admitted to the psychiatry service due to SI, hallucinations, delusions, psychosis features. We are consulted for medical clearance for admission to [facility] and treatment of underlying psychiatric illness. Patient was brought into [facility] ED on 8/6/2023 accompanied in police custody after found lying in the street asking for them to show chart. Patient also had recently presented to [address] [place] [address] on 8/4/2023 after having "" smoked weed and taken 16 Gummies.   Past medical history Per a family member she does have some history of mental illness and has no past medical history. On evaluation patient denies any physical complaints such as headache, dizziness, chest pain, shortness of breath, nausea/vomiting/diarrhea at this time. Inpatient admission for psychiatric evaluation. Labs ECG were reviewed. Review of Systems:    Review of Systems   Constitutional: Negative for chills and fever. HENT: Negative for ear pain and sore throat. Eyes: Negative for pain and visual disturbance. Respiratory: Negative for cough and shortness of breath. Cardiovascular: Negative for chest pain and palpitations. Gastrointestinal: Negative for abdominal pain and vomiting. Genitourinary: Negative for dysuria and hematuria. Musculoskeletal: Negative for arthralgias and back pain. Skin: Negative for color change and rash. Neurological: Negative for seizures and syncope. All other systems reviewed and are negative. Past Medical and Surgical History:     Past Medical History:   Diagnosis Date   • Psychosis ([address])    • Substance abuse ([address])        No past surgical history on file.     Meds/Allergies:    all medications and allergies reviewed    Allergies: No Known Allergies    Social History:     Marital Status: Single    Substance Use History:   Social History     Substance and Sexual Activity   Alcohol Use Not Currently     Social History     Tobacco Use   Smoking Status Never   Smokeless Tobacco Never     Social History     Substance and Sexual Activity   Drug Use Yes   • Types: Marijuana, Psilocybin    Comment: Pt states, "I'm not really sure what was in those gummies"       Family History:    non-contributory    Physical Exam:     Vitals:   Blood Pressure: 127/74 (08/07/23 2300)  Pulse: 72 (08/07/23 2300)  Temperature: 97.9 °F (36.6 °C) (08/07/23 2300)  Temp Source: Temporal (08/07/23 2300)  Respirations: 18 (08/07/23 2300)  Height: 5' 5" (165.1 cm) (08/07/23 2300)  Weight - Scale: 73 kg (161 lb) (08/07/23 2300)  SpO2: 98 % (08/07/23 2300)    Physical Exam  Constitutional:       General: She is not in acute distress. Appearance: Normal appearance. She is not ill-appearing. HENT:      Head: Normocephalic and atraumatic. Nose: Nose normal.      Mouth/Throat:      Mouth: Mucous membranes are moist.   Eyes:      Extraocular Movements: Extraocular movements intact. Cardiovascular:      Rate and Rhythm: Normal rate and regular rhythm. Heart sounds: Normal heart sounds. Pulmonary:      Breath sounds: Normal breath sounds. No wheezing. Abdominal:      General: Abdomen is flat. Bowel sounds are normal.      Palpations: Abdomen is soft. Skin:     General: Skin is warm and dry. Neurological:      Mental Status: She is alert. Additional Data:     Lab Results: I have personally reviewed pertinent reports. Results from last 7 days   Lab Units 08/06/23  0454   WBC Thousand/uL 5.89   HEMOGLOBIN g/dL 11.9   HEMATOCRIT % 37.9   PLATELETS Thousands/uL 210   NEUTROS PCT % 63   LYMPHS PCT % 29   MONOS PCT % 8   EOS PCT % 0     Results from last 7 days   Lab Units 08/06/23  0454   SODIUM mmol/L 140   POTASSIUM mmol/L 3.2*   CHLORIDE mmol/L 109*   CO2 mmol/L 25   BUN mg/dL 3*   CREATININE mg/dL 0.87   ANION GAP mmol/L 6   CALCIUM mg/dL 8.6   ALBUMIN g/dL 4.2   TOTAL BILIRUBIN mg/dL 0.84   ALK PHOS U/L 36   ALT U/L 23   AST U/L 26   GLUCOSE RANDOM mg/dL 103             No results found for: "HGBA1C"        EKG, Pathology, and Other Studies Reviewed on Admission:   · EKG 8/6/2023 EKG reviewed by myself ventricular rate 73 QTc 451 normal sinus rhythm with a sinus arrhythmia there are no prior EKGs to compare with she has no acute EKG findings and no ST elevations. ** Please Note: This note has been constructed using a voice recognition system.  **    I have spent a total time of 45 minutes on 08/08/23 in caring for this patient including Patient and family education, Importance of tx compliance, Counseling / Coordination of care, Documenting in the medical record, Reviewing / ordering tests, medicine, procedures   and Obtaining or reviewing history  .

## 2023-08-08 NOTE — NURSING NOTE
Locked seclusion restraint discontinued @ 06:25. Pt now in behavioral control and appears to be sleeping. Debriefing occurred.

## 2023-08-08 NOTE — ED NOTES
Patient is accepted at [facility]. Patient is accepted by Dr. Miryam Pierre per Juan J Hoffmann. Transportation is arranged with behaview, [facility]. Transportation is scheduled for 2200. Patient may go to the floor at anytime after receiving potassium. Nurse report is to be called to [phone] prior to patient transfer.

## 2023-08-08 NOTE — ED NOTES
Call placed to pt Marija barron, to inform that pt is leaving [facility] in approximately 30 minutes to transfer to [facility] for treatment. Provided Dana grijalva/address of [facility] & informed the unit pt will be inpatient, 3B.

## 2023-08-08 NOTE — ASSESSMENT & PLAN NOTE
· Patient's vital signs stable afebrile patient seen and examined by myself today labs from 8/8/2023 have not been yet collected however labs from 8/6/2023 in the ER reviewed sodium 140 potassium 3.2 which was repleted creatinine 0.87U tox positive for [facility]  · Patient medically cleared for admit  · SL IM will sign off please call with any questions or concerns

## 2023-08-08 NOTE — PROGRESS NOTES
Unable to complete with patient her admission self assessment due to sleeping and being in the observation and secure room.

## 2023-08-08 NOTE — PLAN OF CARE
Problem: DISCHARGE PLANNING  Goal: Discharge to home or other facility with appropriate resources  Description: INTERVENTIONS:  - Identify barriers to discharge w/patient and caregiver  - Arrange for needed discharge resources and transportation as appropriate  - Identify discharge learning needs (meds, wound care, etc.)  - Arrange for interpretive services to assist at discharge as needed  - Refer to Case Management Department for coordinating discharge planning if the patient needs post-hospital services based on physician/advanced practitioner order or complex needs related to functional status, cognitive ability, or social support system  Outcome: Not Progressing     Problem: Alteration in Thoughts and Perception  Goal: Verbalize thoughts and feelings  Description: Interventions:  - Promote a nonjudgmental and trusting relationship with the patient through active listening and therapeutic communication  - Assess patient's level of functioning, behavior and potential for risk  - Engage patient in 1 on 1 interactions  - Encourage patient to express fears, feelings, frustrations, and discuss symptoms    - [place] patient to reality, help patient recognize reality-based thinking   - Administer medications as ordered and assess for potential side effects  - Provide the patient education related to the signs and symptoms of the illness and desired effects of prescribed medications  Outcome: Not Progressing  Goal: Refrain from acting on delusional thinking/internal stimuli  Description: Interventions:  - Monitor patient closely, per order   - Utilize least restrictive measures   - Set reasonable limits, give positive feedback for acceptable   - Administer medications as ordered and monitor of potential side effects  Outcome: Not Progressing  Goal: Agree to be compliant with medication regime, as prescribed and report medication side effects  Description: Interventions:  - Offer appropriate PRN medication and supervise ingestion; conduct AIMS, as needed   Outcome: Not Progressing  Goal: Attend and participate in unit activities, including therapeutic, recreational, and educational groups  Description: Interventions:  -Encourage Visitation and family involvement in care  Outcome: Not Progressing     Problem: MAGEN  Goal: Will exhibit normal sleep and speech and no impulsivity  Description: INTERVENTIONS:  - Administer medication as ordered  - Set limits on impulsive behavior  - Make attempts to decrease external stimuli as possible  Outcome: Not Progressing     Problem: PSYCHOSIS  Goal: Will report no hallucinations or delusions  Description: Interventions:  - Administer medication as  ordered  - Every waking shifts and PRN assess for the presence of hallucinations and or delusions  - Assist with reality testing to support increasing orientation  - Assess if patient's hallucinations or delusions are encouraging self-harm or harm to others and intervene as appropriate  Outcome: Not Progressing     Problem: SLEEP DISTURBANCE  Goal: Will exhibit normal sleeping pattern  Description: Interventions:  -  Assess the patients sleep pattern, noting recent changes  - Administer medication as ordered  - Decrease environmental stimuli, including noise, as appropriate during the night  - Encourage the patient to actively participate in unit groups and or exercise during the day to enhance ability to achieve adequate sleep at night  - Assess the patient, in the morning, encouraging a description of sleep experience  Outcome: Not Progressing     Problem: INVOLUNTARY ADMIT  Goal: Will cooperate with staff recommendations and doctor's orders and will demonstrate appropriate behavior  Description: INTERVENTIONS:  - Treat underlying conditions and offer medication as ordered  - Educate regarding involuntary admission procedures and rules  - Utilize positive consistent limit setting strategies to support patient and staff safety  Outcome: Not Progressing

## 2023-08-08 NOTE — ED NOTES
Called [facility] 3B and spoke to ENRIQUE. Report provided. Patient left with [place] ambulance as report was called.      Jalen Mendoza RN  08/07/23 [address], RN  08/07/23 5616

## 2023-08-08 NOTE — NURSING NOTE
Patient continues on 1:1 continual observation for safety/non-suicidal. Tech at bedside. Patient is resting at this present time and does not appear in any outward sign of distress.

## 2023-08-08 NOTE — NURSING NOTE
Pt crawling on floor, pushing unit doors, chanting. Pt is not verbally redirectable. Pt uncooperative and disruptive to milieu, waking peers. Security called to unit, pt escorted to seclusion room yelling. Pt placed in locked seclusion @ 04:20. [facility] provider Dr. Gm Knox notified via TT, continual observation staff in place.  Pt given PRN IM haldol 5mg, cogentin 1mg, and ativan 2mg @ 04:22 with Security assist.     Pt currently chanting repeatedly, behaving bizarrely, and yelling "All these [place] people fuck me up!"

## 2023-08-08 NOTE — NURSING NOTE
1:1 dc'd. Patient observed walking hallways talking to herself and responding to internal stimuli in her native tongue; [place]

## 2023-08-08 NOTE — PROGRESS NOTES
08/08/23 0818   Team Meeting   Meeting Type Daily Rounds   Team Members Present   Team Members Present Physician;Nurse;   Physician Team Member [id number] Team Member Yakov Management Team Member Gladis   Patient/Family Present   Patient Present No   Patient's Family Present No     Pt is a new admit here on a 302 for SI, bizarre, erratic behavior following ingestion of THC gummies. Pt paranoid and religiously preoccupied upon admission. Pt later crawling around the unit, attempting to elope from unit and stating people were trying to kill her. Pt received IM Haldol, Ativan, Cogentin and placed in 4 point locked restraints. Discharge to be determined.

## 2023-08-08 NOTE — NURSING NOTE
PRN haldol, ativan and cogentin effective; pt resting calm and cooperative presently.  Continues to RTIS

## 2023-08-08 NOTE — NURSING NOTE
Pt 302 from [facility]-ED where pt brought in by EMS on 8/6 after pt found wandering the streets and acting erratically. Pt seen on 8/5 at [facility] following reportedly smoking marijuana and using psilocybin mushrooms, pt had been 302'ed and released. Pt potassium was 3.2 in ED, given supplementation before arrival to unit. Pt reports not sleeping in several days and decreased appetite. Pt minimizes SS and appears suspicious when asked about AVH. Pt is bizarre and disorganized in thought but largely cooperative and responsive to redirection. Pt does require frequent redirection, pt presents as acutely paranoid, manic and actively RTIS whispering and chanting to self. When pt was oriented to unit by nurse and showed to room, pt became acutely paranoid of roommate, refusing to go into room stating that sleeping roommate, "is the devil!" Pt states, "I won't sleep tonight, I don't need to" despite alternative accommodations offered. Pt refuses scheduled melatonin and offered PRNs. Pt refuses to allow door to close while using toilet in milieu despite education and encouragement. 2-RN skin check resulted with minor blisters on tops of feet, otherwise unremarkable.

## 2023-08-08 NOTE — ASSESSMENT & PLAN NOTE
· Patient's vital signs stable afebrile patient seen and examined by myself today labs from 8/8/2023 have not been yet collected however labs from 8/6/2023 in the ER reviewed sodium 140 potassium 3.2 which was repleted creatinine 0.87U tox positive for [facility]  · Patient medically cleared for admit  · SL IM will sign off please call with any questions or concerns

## 2023-08-08 NOTE — ED NOTES
Prepared packet for transfer to [facility] 3B. Met with pt to advise of accepting placement; pt in agreement. Packet left with pt JONAH Saul.

## 2023-08-08 NOTE — RESTRAINT FACE TO FACE
Restraint Face to Face   Екатерина Obrien 28 y.o. female MRN: [id number]  Unit/Bed#: [facility] 342-01 Encounter: [id number]      Physical Evaluation: stable, no apparent injury    Purpose for Restraints/ Seclusion High risk for self harm, High risk for causing significant disruption of treatment environment  and high risk for flight     Patient's reaction to the intervention: tolerated    Patient's medical condition:stable    Patient's Behavioral condition: screaming, not following redirection    Restraints to be Continued

## 2023-08-08 NOTE — ASSESSMENT & PLAN NOTE
· Patient's U tox on 8/6/2023 at [facility] ER positive for [facility] in her urine  · Patient presented to [address] [place] [address] 8/4/2023 having THC stating that she had smoked weed and took 16 Gummies  · THC cessation education

## 2023-08-08 NOTE — NURSING NOTE
Patient denies AVH/SI/HI and is more organized in thought. Patient is walking the halls with tech. Patient did spend a short period of time at doorway looking at it, and walked back down the good.

## 2023-08-08 NOTE — ED NOTES
No PRE-CERT required as pt is without insurance coverage & being placed in-network for [facility].

## 2023-08-09 LAB
ATRIAL RATE: 0 BPM
ATRIAL RATE: 84 BPM
BACTERIA UR QL AUTO: NORMAL /HPF
BILIRUB UR QL STRIP: NEGATIVE
CLARITY UR: CLEAR
COLOR UR: NORMAL
GLUCOSE UR STRIP-MCNC: NEGATIVE MG/DL
HGB UR QL STRIP.AUTO: NEGATIVE
KETONES UR STRIP-MCNC: NEGATIVE MG/DL
LEUKOCYTE ESTERASE UR QL STRIP: NEGATIVE
NITRITE UR QL STRIP: NEGATIVE
NON-SQ EPI CELLS URNS QL MICRO: NORMAL /HPF
P AXIS: 67 DEGREES
PH UR STRIP.AUTO: 7 [PH]
PR INTERVAL: 160 MS
PROT UR STRIP-MCNC: NEGATIVE MG/DL
QRS AXIS: 0 DEGREES
QRS AXIS: 73 DEGREES
QRSD INTERVAL: 0 MS
QRSD INTERVAL: 70 MS
QT INTERVAL: 0 MS
QT INTERVAL: 378 MS
QTC INTERVAL: 0 MS
QTC INTERVAL: 446 MS
RBC #/AREA URNS AUTO: NORMAL /HPF
SP GR UR STRIP.AUTO: 1 (ref 1–1.04)
T WAVE AXIS: 0 DEGREES
T WAVE AXIS: 56 DEGREES
UROBILINOGEN UA: NEGATIVE MG/DL
VENTRICULAR RATE: 0 BPM
VENTRICULAR RATE: 84 BPM
WBC #/AREA URNS AUTO: NORMAL /HPF

## 2023-08-09 PROCEDURE — 93005 ELECTROCARDIOGRAM TRACING: CPT

## 2023-08-09 PROCEDURE — 81001 URINALYSIS AUTO W/SCOPE: CPT | Performed by: PSYCHIATRY & NEUROLOGY

## 2023-08-09 PROCEDURE — 99233 SBSQ HOSP IP/OBS HIGH 50: CPT | Performed by: PSYCHIATRY & NEUROLOGY

## 2023-08-09 PROCEDURE — 93010 ELECTROCARDIOGRAM REPORT: CPT | Performed by: STUDENT IN AN ORGANIZED HEALTH CARE EDUCATION/TRAINING PROGRAM

## 2023-08-09 RX ORDER — ERGOCALCIFEROL 1.25 MG/1
50000 CAPSULE ORAL WEEKLY
Status: DISCONTINUED | OUTPATIENT
Start: 2023-08-09 | End: 2023-08-29 | Stop reason: HOSPADM

## 2023-08-09 RX ORDER — OLANZAPINE 10 MG/1
10 TABLET, ORALLY DISINTEGRATING ORAL
Status: DISCONTINUED | OUTPATIENT
Start: 2023-08-09 | End: 2023-08-14

## 2023-08-09 RX ADMIN — Medication 3 MG: at 21:06

## 2023-08-09 RX ADMIN — ERGOCALCIFEROL 50000 UNITS: 1.25 CAPSULE ORAL at 09:11

## 2023-08-09 RX ADMIN — OLANZAPINE 10 MG: 10 TABLET, ORALLY DISINTEGRATING ORAL at 21:06

## 2023-08-09 NOTE — TREATMENT PLAN
TREATMENT PLAN REVIEW - [address] [address]60 y.o. 1990 female MRN: [id number]    [address] Room / Bed: Julia [address] 342-01 Encounter: [id number]          Admit Date/Time:  8/7/2023 10:52 PM    Treatment Team: Attending Provider: Arlen Palacio MD; : Caryn Roa; Resident: Eda Anderson DO; Patient Care Assistant: Andrea Gómez;  Patient Care Assistant: Keisha Carvalho; Registered Nurse: Kate Correa RN    Diagnosis: Principal Problem:    Psychosis [facility]) rule out schizophrenia versus schizoaffective disorder  Active Problems:    Medical clearance for psychiatric admission    Mild tetrahydrocannabinol (THC) abuse      Patient Strengths/Assets: good physical health, negotiates basic needs, work skills    Patient Barriers/Limitations: lack of social/family support, patient is on an involuntary commitment, poor insight, poor self-care    Short Term Goals: decrease in paranoid thoughts, decrease in psychotic symptoms, decrease in level of agitation, ability to stay safe on the unit, ability to stay free of restraints, improvement in insight, improvement in reality testing, improvement in self care, sleep improvement, improvement in appetite, increase in socialization with peers on the unit    Long Term Goals: resolution of psychotic symptoms, improvement in reality testing, improved insight, acceptance of need for psychiatric follow up after discharge, adequate self care, adequate sleep    Progress Towards Goals: starting psychiatric medications as prescribed    Recommended Treatment: medication management, patient medication education, group therapy, milieu therapy, continued Behavioral Health psychiatric evaluation/assessment process    Treatment Frequency: daily medication monitoring, group and milieu therapy daily, monitoring through interdisciplinary rounds, monitoring through weekly patient care conferences    Expected Discharge Date:  TBD    Discharge Plan: discharge to home, referral for outpatient medication management with a psychiatrist, referral for outpatient psychotherapy, referrals as indicated    Treatment Plan Created/Updated By: Viridiana Hoff MD

## 2023-08-09 NOTE — PROGRESS NOTES
08/09/23 1259   Team Meeting   Meeting Type Tx Team Meeting   Initial Conference Date 08/09/23   Team Members Present   Team Members Present Physician;Nurse;   Physician Team Member Fifi Charles Final Team Member [id number] Management Team Member Gladis   Patient/Family Present   Patient Present Yes   Patient's Family Present No     Tx plan was reviewed and discussed with Pt. Pt was encouraged to attend groups. Medication was discussed with Pt. Pt signed tx plan.

## 2023-08-09 NOTE — NURSING NOTE
Two small 0.75x 0.75 abrasions observed bilaterally on anterior of feet. Abrasion on L foot, bed is pink, free of odor and drainage. There is no bleeding. Edges are well approximated. Site is dry, scabbed over and open to air. There is no odor. Abrasion on R foot, bed is pink, free of odor and drainage. There is no bleeding. Edges are well approximated. Site is dry, scabbed over and open to air. There is no odor. Pt given directions to keep area clean and free from pressure, socks are permittable. Pt states these abrasions started from shoes being worn that rubbed against feet previously.

## 2023-08-09 NOTE — PROGRESS NOTES
08/09/23 1000   Activity/Group Checklist   Group Other (Comment)  (Group Art Therapy/Psychodynamic, Creatively Exploring Relationship with Identifying Alternative Options)   Attendance Attended   Attendance Duration (min) Greater than 60   Interactions Interacted appropriately   Affect/Mood Appropriate   Goals Achieved Discussed coping strategies; Able to listen to others; Able to engage in interactions; Able to give feedback to another;Able to recieve feedback  (Able to engage materials and directive; full participation)

## 2023-08-09 NOTE — PROGRESS NOTES
08/09/23 0892   Team Meeting   Meeting Type Daily Rounds   Team Members Present   Team Members Present Physician;Nurse;   Physician Team Member [id number] [facility] Team Member Yakov Management Team Member Gladis   Patient/Family Present   Patient Present No   Patient's Family Present No   Pt's 1:1 d/c and pt remains on mouth checks. Pt observed responding to internal stimuli. Dumping water on her bed. Remains disorganized, bizarre, paranoid. Attempting to elope from unit, PRN Atarax and Haldol which were not effective. Difficult to redirect. Pt awake overnight, received PRN Trazodone and melatonin which was not effective. Pt taking medication from nurse and pretending to run away with medication. Pt redirected. 303 hearing to be held tomorrow. Discharge to be determined.

## 2023-08-09 NOTE — NURSING NOTE
Pt wandering about unit and difficult to redirect. Redirected from exit doors several times and from peers rooms. Pt appears to be RTIS and is religiously preoccupied praying to Allah while standing at exit door. Pt given PRN atarax and haldol at 2008 per scales with little effect at 2100. Pt continued to wander halls and mocked staff. Pt wandered into another peers room causing them severe anxiety. Pt accepted PRN trazodone with HS meds. Of note, pt attempted to rum from NS with pills in her hand on first med pass and 2 RNs chased pt to redirect and perform mouth check.  Pt stated at that time, "you guys are just like my dad."

## 2023-08-09 NOTE — NURSING NOTE
Pt is visible on the unit wandering the halls. Guarded and suspicious of this writer but polite. Examining medication package prior to taking. Denies psych symptoms at this time.

## 2023-08-09 NOTE — PLAN OF CARE
Problem: DISCHARGE PLANNING  Goal: Discharge to home or other facility with appropriate resources  Description: INTERVENTIONS:  - Identify barriers to discharge w/patient and caregiver  - Arrange for needed discharge resources and transportation as appropriate  - Identify discharge learning needs (meds, wound care, etc.)  - Arrange for interpretive services to assist at discharge as needed  - Refer to Case Management Department for coordinating discharge planning if the patient needs post-hospital services based on physician/advanced practitioner order or complex needs related to functional status, cognitive ability, or social support system  Outcome: Progressing     Problem: Alteration in Thoughts and Perception  Goal: Agree to be compliant with medication regime, as prescribed and report medication side effects  Description: Interventions:  - Offer appropriate PRN medication and supervise ingestion; conduct AIMS, as needed   Outcome: Progressing     Problem: SAFETY, RESTRAINT - VIOLENT/SELF-DESTRUCTIVE  Goal: Remains free of harm/injury from restraints (Restraint for Violent/Self-Destructive Behavior)  Description: INTERVENTIONS:  - Instruct patient/family regarding restraint use   - Assess and monitor physiologic and psychological status   - Provide interventions and comfort measures to meet assessed patient needs   - Ensure continuous in person monitoring is provided   - Identify and implement measures to help patient regain control  - Assess readiness for release of restraint  Outcome: Progressing  Goal: Returns to optimal restraint-free functioning  Description: INTERVENTIONS:  - Assess the patient's behavior and symptoms that indicate continued need for restraint  - Identify and implement measures to help patient regain control  - Assess readiness for release of restraint   Outcome: Progressing     Problem: Nutrition/Hydration-ADULT  Goal: Nutrient/Hydration intake appropriate for improving, restoring or maintaining nutritional needs  Description: Monitor and assess patient's nutrition/hydration status for malnutrition. Collaborate with interdisciplinary team and initiate plan and interventions as ordered. Monitor patient's weight and dietary intake as ordered or per policy. Utilize nutrition screening tool and intervene as necessary. Determine patient's food preferences and provide high-protein, high-caloric foods as appropriate.      INTERVENTIONS:  - Monitor oral intake, urinary output, labs, and treatment plans  - Assess nutrition and hydration status and recommend course of action  - Evaluate amount of meals eaten  - Assist patient with eating if necessary   - Allow adequate time for meals  - Recommend/ encourage appropriate diets, oral nutritional supplements, and vitamin/mineral supplements  - Order, calculate, and assess calorie counts as needed  - Recommend, monitor, and adjust tube feedings and TPN/PPN based on assessed needs  - Assess need for intravenous fluids  - Provide specific nutrition/hydration education as appropriate  - Include patient/family/caregiver in decisions related to nutrition  Outcome: Progressing     Problem: Alteration in Thoughts and Perception  Goal: Verbalize thoughts and feelings  Description: Interventions:  - Promote a nonjudgmental and trusting relationship with the patient through active listening and therapeutic communication  - Assess patient's level of functioning, behavior and potential for risk  - Engage patient in 1 on 1 interactions  - Encourage patient to express fears, feelings, frustrations, and discuss symptoms    - [place] patient to reality, help patient recognize reality-based thinking   - Administer medications as ordered and assess for potential side effects  - Provide the patient education related to the signs and symptoms of the illness and desired effects of prescribed medications  Outcome: Not Progressing  Goal: Refrain from acting on delusional thinking/internal stimuli  Description: Interventions:  - Monitor patient closely, per order   - Utilize least restrictive measures   - Set reasonable limits, give positive feedback for acceptable   - Administer medications as ordered and monitor of potential side effects  Outcome: Not Progressing  Goal: Attend and participate in unit activities, including therapeutic, recreational, and educational groups  Description: Interventions:  -Encourage Visitation and family involvement in care  Outcome: Not Progressing     Problem: MAGEN  Goal: Will exhibit normal sleep and speech and no impulsivity  Description: INTERVENTIONS:  - Administer medication as ordered  - Set limits on impulsive behavior  - Make attempts to decrease external stimuli as possible  Outcome: Not Progressing     Problem: PSYCHOSIS  Goal: Will report no hallucinations or delusions  Description: Interventions:  - Administer medication as  ordered  - Every waking shifts and PRN assess for the presence of hallucinations and or delusions  - Assist with reality testing to support increasing orientation  - Assess if patient's hallucinations or delusions are encouraging self-harm or harm to others and intervene as appropriate  Outcome: Not Progressing     Problem: SLEEP DISTURBANCE  Goal: Will exhibit normal sleeping pattern  Description: Interventions:  -  Assess the patients sleep pattern, noting recent changes  - Administer medication as ordered  - Decrease environmental stimuli, including noise, as appropriate during the night  - Encourage the patient to actively participate in unit groups and or exercise during the day to enhance ability to achieve adequate sleep at night  - Assess the patient, in the morning, encouraging a description of sleep experience  Outcome: Not Progressing
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
## 2023-08-09 NOTE — SOCIAL WORK
303 petition completed and submitted to [facility] for hearing on 8/10

## 2023-08-09 NOTE — NURSING NOTE
Pt fell asleep in dayroom after HS meds and was escorted to unlocked observation room for sleep due to her disruptive behavior with roommate.
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
## 2023-08-10 PROCEDURE — 99233 SBSQ HOSP IP/OBS HIGH 50: CPT | Performed by: PSYCHIATRY & NEUROLOGY

## 2023-08-10 RX ADMIN — Medication 3 MG: at 21:28

## 2023-08-10 RX ADMIN — TRAZODONE HYDROCHLORIDE 50 MG: 50 TABLET ORAL at 03:01

## 2023-08-10 RX ADMIN — HYDROXYZINE HYDROCHLORIDE 100 MG: 50 TABLET, FILM COATED ORAL at 03:02

## 2023-08-10 RX ADMIN — OLANZAPINE 10 MG: 10 TABLET, ORALLY DISINTEGRATING ORAL at 21:28

## 2023-08-10 NOTE — NURSING NOTE
PRN trazodone 50mg was ineffective. Patient just came out of room and asked for time. Patient not sleeping. PRN atarax 100mg effective. Patient is less irritable and calmer.

## 2023-08-10 NOTE — SOCIAL WORK
303 hearing held this morning with CMP. Pt was in attendance and in agreement with staying for additional treatment. 303 upheld.  303 as of 8/10, expires 8/30

## 2023-08-10 NOTE — PROGRESS NOTES
08/10/23 0846   Team Meeting   Meeting Type Daily Rounds   Team Members Present   Team Members Present Physician;Nurse;   Physician Team Member [id number] [facility] Team Member Yakov Management Team Member Gladis   Patient/Family Present   Patient Present No   Patient's Family Present No   Pt awake all night, was moved to single room. Restless, responding to internal stimuli. Offered PRN medication, initially refusing. PRN Trazodone given which was not effective. 303 hearing to be held this morning. Discharge to be determined.

## 2023-08-10 NOTE — PROGRESS NOTES
08/10/23 1000   Activity/Group Checklist   Group Other (Comment)  (Group Art Therapy/Psychodynamic, Creative exploration of self through trees)   Attendance Attended   Attendance Duration (min) Greater than 60   Interactions Interacted appropriately   Affect/Mood Appropriate

## 2023-08-10 NOTE — NURSING NOTE
Patient has not slept and responding to internal stimuli while pacing the unit. Patient requested medication for sleep and anxiety. PRN atarax 100mg and trazodone 50mg was administered. Patient would not take medication from female nurse. Patient would only take medication from male nurse. Patient examined medication packaging closely prior to taking. Patient gave the middle finger to female nurse as she walked away.  Will monitor for effectiveness

## 2023-08-10 NOTE — PROGRESS NOTES
Progress Note - Behavioral Health   Nathanial Gowers 28 y.o. female MRN: [id number]  Unit/Bed#: [facility] 352-01 Encounter: [id number]    Assessment/Plan   Principal Problem:    Psychosis ([address]) rule out schizophrenia versus schizoaffective disorder  Active Problems:    Medical clearance for psychiatric admission    Mild tetrahydrocannabinol (THC) abuse      Recommended Treatment:   Continue Zyprexa Zydis 10 mg daily for psychosis, mouth checks    Continue with group therapy, milieu therapy and occupational therapy. Continue frequent safety checks and vitals per unit protocol. Case discussed with treatment team.  Risks, benefits and possible side effects of Medications: Risks, benefits, and possible side effects of medications have been explained to the patient, who verbalizes understanding    Current Legal Status: 303 as of today, expires 08/30  Disposition: TBD  ------------------------------------------------------------    Subjective: Per nursing report, Mayela Laird was noted to be responding to internal stimuli and pacing the unit. She did not sleep overnight. Patient requested and received Atarax 100 mg and trazodone 50 mg for insomnia but refused to take them from female nurse but took them from male nurse. She observed the packaging prior to take the medication. Patient gave middle finger to female nurse and walked away. This morning, patient was isolative and RIS. She is noted to have preoccupations with periods of lucidity. She appears internally preoccupied. Patient was compliant with psychiatric medications. Today, Mayela Laird was seen and evaluated alongside attending physician. On evaluation, patient is disheveled, guarded, less tangential in thought process, circumstantial at times. She remains internally preoccupied at times. She reports feeling "alright" this morning. Patient states she got 2-3 hours of sleep overnight.   She reports she did not have breakfast this morning and just had coffee but will have some lunch. She reports tolerating Zyprexa Zydis well and denies any medication side effects at this time. She reports noticing that the medication has helped her with "sleep" and staying "calm". Patient denies SI/HI/AVH at this time. Progress Toward Goals: slow improvement    Psychiatric Review of Systems:  Behavior over the last 24 hours: improving slowly  Sleep: decreased  Appetite: improving  Medication side effects: none verbalized  ROS: Complete review of systems is negative except as noted above.     Vital signs in last 24 hours:   Temp:  [97 °F (36.1 °C)-97.5 °F (36.4 °C)] 97.5 °F (36.4 °C)  HR:  [78-86] 78  Resp:  [16] 16  BP: (100-109)/(55-65) 100/63    Mental Status Exam:  Appearance:  alert, Intermittent eye contact, appears stated age, marginal grooming/hygiene, disheveled and Dressed in hospital attire   Behavior:  calm, cooperative, guarded and sitting comfortably   Motor: no abnormal movements and normal gait and balance   Speech:  spontaneous, clear, normal rate, normal volume and coherent   Mood:  "alright"   Affect:  Less blunted   Thought Process:  disorganized, circumstantial, tangential   Thought Content:  Less paranoid ideation   Perceptual disturbances: no reported hallucinations and does not appear to be responding to internal stimuli at this time; appears internally preoccupied at times and distracted at times   Risk Potential: No active or passive suicidal or homicidal ideation was verbalized during interview   Cognition: oriented to self and situation, appears to be of average intelligence and cognition not formally tested   Insight:  Poor   Judgment: Poor     Current Medications:  Current Facility-Administered Medications   Medication Dose Route Frequency Provider Last Rate   • acetaminophen  650 mg Oral Q6H PRN Lashonda Smiley MD     • acetaminophen  650 mg Oral Q4H PRN Lashonda Smiley MD     • acetaminophen  975 mg Oral Q6H PRN Lashonda Smiley MD     • aluminum-magnesium hydroxide-simethicone  30 mL Oral Q4H PRN Wendy Espinoza MD     • haloperidol lactate  2.5 mg Intramuscular Q4H PRN Max 4/day Wendy Espinoza MD      And   • LORazepam  1 mg Intramuscular Q4H PRN Max 4/day Wendy Espinoza MD      And   • benztropine  0.5 mg Intramuscular Q4H PRN Max 4/day Wendy Espinoza MD     • haloperidol lactate  5 mg Intramuscular Q4H PRN Max 4/day Wendy Espinoza MD      And   • LORazepam  2 mg Intramuscular Q4H PRN Max 4/day Wendy Espinoza MD      And   • benztropine  1 mg Intramuscular Q4H PRN Max 4/day Wendy Espinoza MD     • benztropine  1 mg Oral Q4H PRN Max 6/day Wendy Espinoza MD     • bisacodyl  10 mg Rectal Daily PRN Wendy Espinoza MD     • hydrOXYzine HCL  50 mg Oral Q6H PRN Max 4/day Wendy Espinoza MD      Or   • diphenhydrAMINE  50 mg Intramuscular Q6H PRN Wendy Espinoza MD     • ergocalciferol  50,000 Units Oral Weekly MARY Arzola     • haloperidol  1 mg Oral Q6H PRN Wendy Espinoza MD     • haloperidol  2.5 mg Oral Q4H PRN Max 4/day Wendy Espinoza MD     • haloperidol  5 mg Oral Q4H PRN Max 4/day Wendy Espinoza MD     • hydrOXYzine HCL  100 mg Oral Q6H PRN Max 4/day Wendy Espinoza MD      Or   • LORazepam  2 mg Intramuscular Q6H PRN Wendy Espinoza MD     • hydrOXYzine HCL  25 mg Oral Q6H PRN Max 4/day eWndy Espinoza MD     • melatonin  3 mg Oral HS Wendy Espinoza MD     • OLANZapine  10 mg Oral HS Junior Haylee DO     • polyethylene glycol  17 g Oral Daily PRN Wendy Espinoza MD     • senna-docusate sodium  1 tablet Oral Daily PRN Wendy Espinoza MD     • traZODone  50 mg Oral HS PRN Wendy Espinoza MD         Behavioral Health Medications: all current active meds have been reviewed. Changes as in plan section above. Laboratory results:  I have personally reviewed all pertinent laboratory/tests results.   Recent Results (from the past 48 hour(s))   ECG 12 lead    Collection Time: 08/09/23 4:43 PM   Result Value Ref Range    Ventricular Rate 0 BPM    Atrial Rate 0 BPM    HI Interval  ms    QRSD Interval 0 ms    QT Interval 0 ms    QTC Interval 0 ms    P Axis  degrees    QRS Axis 0 degrees    T Wave Axis 0 degrees   ECG 12 lead    Collection Time: 08/09/23  4:44 PM   Result Value Ref Range    Ventricular Rate 84 BPM    Atrial Rate 84 BPM    HI Interval 160 ms    QRSD Interval 70 ms    QT Interval 378 ms    QTC Interval 446 ms    P Axis 67 degrees    QRS Axis 73 degrees    T Wave Axis 56 degrees   Urinalysis    Collection Time: 08/09/23  4:57 PM   Result Value Ref Range    Clarity, UA Clear Clear, Other    Color, UA Straw Straw, Yellow, Pale Yellow    Specific Gravity, UA 1.005 1.003 - 1.040    pH, UA 7.0 4.5, 5.0, 5.5, 6.0, 6.5, 7.0, 7.5, 8.0    Glucose, UA Negative Negative mg/dl    Ketones, UA Negative Negative mg/dl    Occult Blood, UA Negative Negative    Protein, UA Negative Negative mg/dl    Nitrite, UA Negative Negative    Bilirubin, UA Negative Negative    Leukocytes, UA Negative Negative    WBC, UA None Seen None Seen, 2-4, 5-60 /hpf    RBC, UA None Seen None Seen, 2-4 /hpf    Bacteria, UA Occasional None Seen, Occasional /hpf    Epithelial Cells None Seen None Seen, Occasional /hpf    UROBILINOGEN UA Negative 1.0, Negative mg/dL        [facility], DO

## 2023-08-10 NOTE — NURSING NOTE
Pt denies SI and HI. Pt is observed in hallways, wandering up and down, keeps to self. Minimal engagement with others, intermittent severe degree of responding, preoccupation with periods of lucidity. Limited insight, engaged in effective communication with staff when presented, appears to understand goals and benefits of treatment.  Continues to struggle with internal stimulation and periods of rest.

## 2023-08-10 NOTE — TREATMENT TEAM
08/10/23 1300   Activity/Group Checklist   Group   (recovery group)   Attendance Attended   Attendance Duration (min) 46-60   Interactions Interacted appropriately   Affect/Mood Appropriate   Goals Achieved Discussed coping strategies; Discussed self-esteem issues; Able to listen to others; Able to engage in interactions; Able to reflect/comment on own behavior;Able to self-disclose; Able to recieve feedback

## 2023-08-10 NOTE — PLAN OF CARE
Problem: DISCHARGE PLANNING  Goal: Discharge to home or other facility with appropriate resources  Description: INTERVENTIONS:  - Identify barriers to discharge w/patient and caregiver  - Arrange for needed discharge resources and transportation as appropriate  - Identify discharge learning needs (meds, wound care, etc.)  - Arrange for interpretive services to assist at discharge as needed  - Refer to Case Management Department for coordinating discharge planning if the patient needs post-hospital services based on physician/advanced practitioner order or complex needs related to functional status, cognitive ability, or social support system  Outcome: Progressing     Problem: Alteration in Thoughts and Perception  Goal: Verbalize thoughts and feelings  Description: Interventions:  - Promote a nonjudgmental and trusting relationship with the patient through active listening and therapeutic communication  - Assess patient's level of functioning, behavior and potential for risk  - Engage patient in 1 on 1 interactions  - Encourage patient to express fears, feelings, frustrations, and discuss symptoms    - [place] patient to reality, help patient recognize reality-based thinking   - Administer medications as ordered and assess for potential side effects  - Provide the patient education related to the signs and symptoms of the illness and desired effects of prescribed medications  Outcome: Progressing  Goal: Refrain from acting on delusional thinking/internal stimuli  Description: Interventions:  - Monitor patient closely, per order   - Utilize least restrictive measures   - Set reasonable limits, give positive feedback for acceptable   - Administer medications as ordered and monitor of potential side effects  Outcome: Progressing  Goal: Agree to be compliant with medication regime, as prescribed and report medication side effects  Description: Interventions:  - Offer appropriate PRN medication and supervise ingestion; conduct AIMS, as needed   Outcome: Progressing  Goal: Attend and participate in unit activities, including therapeutic, recreational, and educational groups  Description: Interventions:  -Encourage Visitation and family involvement in care  Outcome: Progressing     Problem: MAGEN  Goal: Will exhibit normal sleep and speech and no impulsivity  Description: INTERVENTIONS:  - Administer medication as ordered  - Set limits on impulsive behavior  - Make attempts to decrease external stimuli as possible  Outcome: Progressing     Problem: PSYCHOSIS  Goal: Will report no hallucinations or delusions  Description: Interventions:  - Administer medication as  ordered  - Every waking shifts and PRN assess for the presence of hallucinations and or delusions  - Assist with reality testing to support increasing orientation  - Assess if patient's hallucinations or delusions are encouraging self-harm or harm to others and intervene as appropriate  Outcome: Progressing     Problem: SLEEP DISTURBANCE  Goal: Will exhibit normal sleeping pattern  Description: Interventions:  -  Assess the patients sleep pattern, noting recent changes  - Administer medication as ordered  - Decrease environmental stimuli, including noise, as appropriate during the night  - Encourage the patient to actively participate in unit groups and or exercise during the day to enhance ability to achieve adequate sleep at night  - Assess the patient, in the morning, encouraging a description of sleep experience  Outcome: Progressing     Problem: INVOLUNTARY ADMIT  Goal: Will cooperate with staff recommendations and doctor's orders and will demonstrate appropriate behavior  Description: INTERVENTIONS:  - Treat underlying conditions and offer medication as ordered  - Educate regarding involuntary admission procedures and rules  - Utilize positive consistent limit setting strategies to support patient and staff safety  Outcome: Progressing     Problem: SAFETY, RESTRAINT - VIOLENT/SELF-DESTRUCTIVE  Goal: Remains free of harm/injury from restraints (Restraint for Violent/Self-Destructive Behavior)  Description: INTERVENTIONS:  - Instruct patient/family regarding restraint use   - Assess and monitor physiologic and psychological status   - Provide interventions and comfort measures to meet assessed patient needs   - Ensure continuous in person monitoring is provided   - Identify and implement measures to help patient regain control  - Assess readiness for release of restraint  Outcome: Progressing  Goal: Returns to optimal restraint-free functioning  Description: INTERVENTIONS:  - Assess the patient's behavior and symptoms that indicate continued need for restraint  - Identify and implement measures to help patient regain control  - Assess readiness for release of restraint   Outcome: Progressing     Problem: Nutrition/Hydration-ADULT  Goal: Nutrient/Hydration intake appropriate for improving, restoring or maintaining nutritional needs  Description: Monitor and assess patient's nutrition/hydration status for malnutrition. Collaborate with interdisciplinary team and initiate plan and interventions as ordered. Monitor patient's weight and dietary intake as ordered or per policy. Utilize nutrition screening tool and intervene as necessary. Determine patient's food preferences and provide high-protein, high-caloric foods as appropriate.      INTERVENTIONS:  - Monitor oral intake, urinary output, labs, and treatment plans  - Assess nutrition and hydration status and recommend course of action  - Evaluate amount of meals eaten  - Assist patient with eating if necessary   - Allow adequate time for meals  - Recommend/ encourage appropriate diets, oral nutritional supplements, and vitamin/mineral supplements  - Order, calculate, and assess calorie counts as needed  - Recommend, monitor, and adjust tube feedings and TPN/PPN based on assessed needs  - Assess need for intravenous fluids  - Provide specific nutrition/hydration education as appropriate  - Include patient/family/caregiver in decisions related to nutrition  Outcome: Progressing

## 2023-08-11 PROCEDURE — 99232 SBSQ HOSP IP/OBS MODERATE 35: CPT | Performed by: PSYCHIATRY & NEUROLOGY

## 2023-08-11 RX ADMIN — ACETAMINOPHEN 975 MG: 325 TABLET ORAL at 18:35

## 2023-08-11 RX ADMIN — Medication 3 MG: at 21:04

## 2023-08-11 RX ADMIN — TRAZODONE HYDROCHLORIDE 50 MG: 50 TABLET ORAL at 21:04

## 2023-08-11 RX ADMIN — OLANZAPINE 10 MG: 10 TABLET, ORALLY DISINTEGRATING ORAL at 21:04

## 2023-08-11 NOTE — NURSING NOTE
Pt cooperative, Pt pleasant upon approach, Pt visible on the unit seen talking with peers, watching TV and pacing the unit, PT guarded and only gives one word answers to questions, Pt denied all pain anxiety and depression, Pt denies HI and SI , Pt denied AH and VH, Pt took all scheduled HS meds, Q7 min safety checks maintained

## 2023-08-11 NOTE — SOCIAL WORK
Pt requesting to have her clothing and shoes back. Pt stated her feet currently hurt due to walking around the unit. Pt also asking for her photo ID and debit card back. LILLIE advised pt her photo ID and debit card must be kept locked up until discharge. LILLIE spoke with tx team regarding pt's requests and determined pt could benefit from additional stabilization prior to receiving belongings. Pt could potentially present as elopement risk d/t disorganized behavior and requesting to discharge.

## 2023-08-11 NOTE — PROGRESS NOTES
08/11/23 1000   Activity/Group Checklist   Group Other (Comment)  (Group Art Therapy/Psychodynamic, Creative exploration of a randomly chosen word to provoke self-reflection)   Attendance Attended   Attendance Duration (min) Greater than 60   Interactions Interacted appropriately   Affect/Mood Appropriate   Goals Achieved Able to engage in interactions

## 2023-08-11 NOTE — TREATMENT TEAM
08/11/23 1735   Activity/Group Checklist   Group Community meeting   Attendance Attended   Attendance Duration (min) 16-30   Interactions Interacted appropriately   Affect/Mood Appropriate   Goals Achieved Able to listen to others; Able to engage in interactions; Able to self-disclose; Able to recieve feedback

## 2023-08-11 NOTE — PROGRESS NOTES
08/11/23 0861   Team Meeting   Meeting Type Daily Rounds   Team Members Present   Team Members Present Physician;Nurse;   Physician Team Member LATASHA/Nicola Charles Final Team [facility] Team Member Julien   Patient/Family Present   Patient Present No   Patient's Family Present No     Pleasant, cooperative, guarded. Pt visible on unit pacing. Pt still disorganized, but improving. Appears internally preoccupied at times. Pt denies all. Med/meal compliant. DC tbd.

## 2023-08-11 NOTE — NURSING NOTE
Spoke with patient in her room. She was bright and pleasant but seemed superficial. She denies SI/HI and A/V hallucinations.   She appeared guarded and reports she hopes her " and doctor can figure out why she is here."

## 2023-08-11 NOTE — TREATMENT TEAM
08/11/23 1300   Activity/Group Checklist   Group   (recovery group)   Attendance Duration (min) 46-60   Interactions Interacted appropriately   Affect/Mood Appropriate   Goals Achieved Discussed coping strategies; Discussed self-esteem issues; Able to listen to others; Able to engage in interactions; Able to reflect/comment on own behavior;Able to self-disclose; Able to recieve feedback; Able to give feedback to another

## 2023-08-11 NOTE — PLAN OF CARE
Problem: DISCHARGE PLANNING  Goal: Discharge to home or other facility with appropriate resources  Description: INTERVENTIONS:  - Identify barriers to discharge w/patient and caregiver  - Arrange for needed discharge resources and transportation as appropriate  - Identify discharge learning needs (meds, wound care, etc.)  - Arrange for interpretive services to assist at discharge as needed  - Refer to Case Management Department for coordinating discharge planning if the patient needs post-hospital services based on physician/advanced practitioner order or complex needs related to functional status, cognitive ability, or social support system  Outcome: Progressing     Problem: Alteration in Thoughts and Perception  Goal: Verbalize thoughts and feelings  Description: Interventions:  - Promote a nonjudgmental and trusting relationship with the patient through active listening and therapeutic communication  - Assess patient's level of functioning, behavior and potential for risk  - Engage patient in 1 on 1 interactions  - Encourage patient to express fears, feelings, frustrations, and discuss symptoms    - [place] patient to reality, help patient recognize reality-based thinking   - Administer medications as ordered and assess for potential side effects  - Provide the patient education related to the signs and symptoms of the illness and desired effects of prescribed medications  Outcome: Progressing  Goal: Refrain from acting on delusional thinking/internal stimuli  Description: Interventions:  - Monitor patient closely, per order   - Utilize least restrictive measures   - Set reasonable limits, give positive feedback for acceptable   - Administer medications as ordered and monitor of potential side effects  Outcome: Progressing  Goal: Agree to be compliant with medication regime, as prescribed and report medication side effects  Description: Interventions:  - Offer appropriate PRN medication and supervise ingestion; conduct AIMS, as needed   Outcome: Progressing  Goal: Attend and participate in unit activities, including therapeutic, recreational, and educational groups  Description: Interventions:  -Encourage Visitation and family involvement in care  Outcome: Progressing     Problem: MAGEN  Goal: Will exhibit normal sleep and speech and no impulsivity  Description: INTERVENTIONS:  - Administer medication as ordered  - Set limits on impulsive behavior  - Make attempts to decrease external stimuli as possible  Outcome: Progressing     Problem: PSYCHOSIS  Goal: Will report no hallucinations or delusions  Description: Interventions:  - Administer medication as  ordered  - Every waking shifts and PRN assess for the presence of hallucinations and or delusions  - Assist with reality testing to support increasing orientation  - Assess if patient's hallucinations or delusions are encouraging self-harm or harm to others and intervene as appropriate  Outcome: Progressing     Problem: SLEEP DISTURBANCE  Goal: Will exhibit normal sleeping pattern  Description: Interventions:  -  Assess the patients sleep pattern, noting recent changes  - Administer medication as ordered  - Decrease environmental stimuli, including noise, as appropriate during the night  - Encourage the patient to actively participate in unit groups and or exercise during the day to enhance ability to achieve adequate sleep at night  - Assess the patient, in the morning, encouraging a description of sleep experience  Outcome: Progressing     Problem: INVOLUNTARY ADMIT  Goal: Will cooperate with staff recommendations and doctor's orders and will demonstrate appropriate behavior  Description: INTERVENTIONS:  - Treat underlying conditions and offer medication as ordered  - Educate regarding involuntary admission procedures and rules  - Utilize positive consistent limit setting strategies to support patient and staff safety  Outcome: Progressing     Problem: SAFETY, RESTRAINT - VIOLENT/SELF-DESTRUCTIVE  Goal: Remains free of harm/injury from restraints (Restraint for Violent/Self-Destructive Behavior)  Description: INTERVENTIONS:  - Instruct patient/family regarding restraint use   - Assess and monitor physiologic and psychological status   - Provide interventions and comfort measures to meet assessed patient needs   - Ensure continuous in person monitoring is provided   - Identify and implement measures to help patient regain control  - Assess readiness for release of restraint  Outcome: Progressing  Goal: Returns to optimal restraint-free functioning  Description: INTERVENTIONS:  - Assess the patient's behavior and symptoms that indicate continued need for restraint  - Identify and implement measures to help patient regain control  - Assess readiness for release of restraint   Outcome: Progressing     Problem: Nutrition/Hydration-ADULT  Goal: Nutrient/Hydration intake appropriate for improving, restoring or maintaining nutritional needs  Description: Monitor and assess patient's nutrition/hydration status for malnutrition. Collaborate with interdisciplinary team and initiate plan and interventions as ordered. Monitor patient's weight and dietary intake as ordered or per policy. Utilize nutrition screening tool and intervene as necessary. Determine patient's food preferences and provide high-protein, high-caloric foods as appropriate.      INTERVENTIONS:  - Monitor oral intake, urinary output, labs, and treatment plans  - Assess nutrition and hydration status and recommend course of action  - Evaluate amount of meals eaten  - Assist patient with eating if necessary   - Allow adequate time for meals  - Recommend/ encourage appropriate diets, oral nutritional supplements, and vitamin/mineral supplements  - Order, calculate, and assess calorie counts as needed  - Recommend, monitor, and adjust tube feedings and TPN/PPN based on assessed needs  - Assess need for intravenous fluids  - Provide specific nutrition/hydration education as appropriate  - Include patient/family/caregiver in decisions related to nutrition  Outcome: Progressing

## 2023-08-11 NOTE — PROGRESS NOTES
Progress Note - Behavioral Health   [facility] 28 y.o. female MRN: [id number]  Unit/Bed#: U 352-01 Encounter: [id number]    Assessment/Plan   Principal Problem:    Psychosis ([address]) rule out schizophrenia versus schizoaffective disorder  Active Problems:    Medical clearance for psychiatric admission    Mild tetrahydrocannabinol (THC) abuse      Recommended Treatment:   Continue Zyprexa Zydis 10 mg daily for psychosis, mouth checks    Continue with group therapy, milieu therapy and occupational therapy. Continue frequent safety checks and vitals per unit protocol. Case discussed with treatment team.  Risks, benefits and possible side effects of Medications: Risks, benefits, and possible side effects of medications have been explained to the patient, who verbalizes understanding    Current Legal Status: 303, expires 08/30  Disposition: TBD, coordinating with case management  ------------------------------------------------------------    Subjective: Per nursing report, Anahi Valenzuela has been cooperative, visible talking with peers and watching TV, pacing the unit. She has noted to be guarded and scant at times. She is compliant with scheduled medications. Today, Anahi Valenzuela was seen and evaluated for conservative care. On evaluation, patient is circumstantial and at times tangential in her thought process, increased rate of speech at times, paranoid and appears internally preoccupied. She reports feeling "okay", "better and better" this morning. Prior to interview, patient stopped this writer in the hallway and asks "review from [place]? I feel like I have seen you a few times there" " as "a ". Patient reassured this writer has not worked in [place]. When asked what is "better and better" patient states "breathing". Patient states she could not sleep and that she cannot fall asleep "in this building". She reports she wakes up feeling sore but denies pain, though cannot further describe her symptoms. She reports the soreness improves with activity. She reports "what is happening while I am sleeping?". She reports she has been trying to read a book and is on page 3 to keep herself "calm" instead of having to pace the unit and to "not waste time". Patient also reports she has tried calling her sister a few times on the unit and the "service cuts out" and believes somebody is intentionally ending the call. Patient also was paranoid regarding her wristband and information on it. Patient reports feeling the people are "floating me" referring to staff not being trustworthy on the unit. Patient reports she did not have breakfast this morning and just had coffee due to pork products. She reports adequate For dinner yesterday and had fish and rice for lunch yesterday. She describes her energy levels as "90%". Patient inquires about her medication regimen and also wants to know "what drugs are in my body". Reviewed patient's drug UDS with her. She reports "my body feels like not my body" and patient was educated on her mental illness. She denies SI/HI/AVH at this time. Progress Toward Goals: slow improvement    Psychiatric Review of Systems:  Behavior over the last 24 hours: Improving slowly  Sleep: Decreased  Appetite: adequate, improving  Medication side effects: none verbalized  ROS: Complete review of systems is negative except as noted above.     Vital signs in last 24 hours:   Temp:  [96.5 °F (35.8 °C)-97.1 °F (36.2 °C)] 96.5 °F (35.8 °C)  HR:  [] 90  Resp:  [16-17] 16  BP: (115-116)/(60-63) 116/60    Mental Status Exam:  Appearance:  alert, dressed in hospital attire eye contact, appears stated age, marginal grooming/hygiene and tattooed   Behavior:  calm, cooperative, guarded at times and sitting comfortably   Motor: no abnormal movements and normal gait and balance   Speech:  spontaneous, clear, increased rate at times, normal volume and coherent   Mood:  "okay", "better and better"    Affect: blunted and irritable   Thought Process:  disorganized, circumstantial and at times tangential   Thought Content: paranoid ideation, somatic preoccupation   Perceptual disturbances: no reported hallucinations and does not appear to be responding to internal stimuli at this time; appears internally preoccupied   Risk Potential: No active or passive suicidal or homicidal ideation was verbalized during interview   Cognition: oriented to self and situation, appears to be of average intelligence and cognition not formally tested   Insight:  Poor   Judgment: Poor     Current Medications:  Current Facility-Administered Medications   Medication Dose Route Frequency Provider Last Rate   • acetaminophen  650 mg Oral Q6H PRN Sarah Vaughn MD     • acetaminophen  650 mg Oral Q4H PRN Sarah Vaughn MD     • acetaminophen  975 mg Oral Q6H PRN Sarah Vaughn MD     • aluminum-magnesium hydroxide-simethicone  30 mL Oral Q4H PRN Sarah Vaughn MD     • haloperidol lactate  2.5 mg Intramuscular Q4H PRN Max 4/day Sarah Vaughn MD      And   • LORazepam  1 mg Intramuscular Q4H PRN Max 4/day Sarah Vaughn MD      And   • benztropine  0.5 mg Intramuscular Q4H PRN Max 4/day Sarah Vaughn MD     • haloperidol lactate  5 mg Intramuscular Q4H PRN Max 4/day Sarah Vaughn MD      And   • LORazepam  2 mg Intramuscular Q4H PRN Max 4/day Sarah Vaughn MD      And   • benztropine  1 mg Intramuscular Q4H PRN Max 4/day Sarah Vaughn MD     • benztropine  1 mg Oral Q4H PRN Max 6/day Sarah Vaughn MD     • bisacodyl  10 mg Rectal Daily PRN Sarah Vaughn MD     • hydrOXYzine HCL  50 mg Oral Q6H PRN Max 4/day Sarah Vaughn MD      Or   • diphenhydrAMINE  50 mg Intramuscular Q6H PRN Sarah Vaughn MD     • ergocalciferol  50,000 Units Oral Weekly MARY Arzola     • haloperidol  1 mg Oral Q6H PRN Sarah Vaughn MD     • haloperidol  2.5 mg Oral Q4H PRN Max 4/day Sarah Vaughn MD     • haloperidol  5 mg Oral Q4H PRN Max 4/day Maggi Eason MD     • hydrOXYzine HCL  100 mg Oral Q6H PRN Max 4/day Maggi Eason MD      Or   • LORazepam  2 mg Intramuscular Q6H PRN Maggi Eason MD     • hydrOXYzine HCL  25 mg Oral Q6H PRN Max 4/day Maggi Eason MD     • melatonin  3 mg Oral HS Maggi Eason MD     • OLANZapine  10 mg Oral HS Roberto Sawyer DO     • polyethylene glycol  17 g Oral Daily PRN Maggi Eason MD     • senna-docusate sodium  1 tablet Oral Daily PRN Maggi Eason MD     • traZODone  50 mg Oral HS PRN Maggi Eason MD         Behavioral Health Medications: all current active meds have been reviewed. Changes as in plan section above. Laboratory results:  I have personally reviewed all pertinent laboratory/tests results.   Recent Results (from the past 48 hour(s))   ECG 12 lead    Collection Time: 08/09/23  4:43 PM   Result Value Ref Range    Ventricular Rate 0 BPM    Atrial Rate 0 BPM    AL Interval  ms    QRSD Interval 0 ms    QT Interval 0 ms    QTC Interval 0 ms    P Axis  degrees    QRS Axis 0 degrees    T Wave Axis 0 degrees   ECG 12 lead    Collection Time: 08/09/23  4:44 PM   Result Value Ref Range    Ventricular Rate 84 BPM    Atrial Rate 84 BPM    AL Interval 160 ms    QRSD Interval 70 ms    QT Interval 378 ms    QTC Interval 446 ms    P Axis 67 degrees    QRS Axis 73 degrees    T Wave Axis 56 degrees   Urinalysis    Collection Time: 08/09/23  4:57 PM   Result Value Ref Range    Clarity, UA Clear Clear, Other    Color, UA Straw Straw, Yellow, Pale Yellow    Specific Gravity, UA 1.005 1.003 - 1.040    pH, UA 7.0 4.5, 5.0, 5.5, 6.0, 6.5, 7.0, 7.5, 8.0    Glucose, UA Negative Negative mg/dl    Ketones, UA Negative Negative mg/dl    Occult Blood, UA Negative Negative    Protein, UA Negative Negative mg/dl    Nitrite, UA Negative Negative    Bilirubin, UA Negative Negative    Leukocytes, UA Negative Negative    WBC, UA None Seen None Seen, 2-4, 5-60 /hpf RBC, UA None Seen None Seen, 2-4 /hpf    Bacteria, UA Occasional None Seen, Occasional /hpf    Epithelial Cells None Seen None Seen, Occasional /hpf    UROBILINOGEN UA Negative 1.0, Negative mg/dL        Burke Romeo DO

## 2023-08-12 PROCEDURE — 99232 SBSQ HOSP IP/OBS MODERATE 35: CPT | Performed by: STUDENT IN AN ORGANIZED HEALTH CARE EDUCATION/TRAINING PROGRAM

## 2023-08-12 RX ADMIN — Medication 3 MG: at 21:12

## 2023-08-12 RX ADMIN — OLANZAPINE 10 MG: 10 TABLET, ORALLY DISINTEGRATING ORAL at 21:12

## 2023-08-12 NOTE — NURSING NOTE
PRN tylenol 975mg was effective. Patient did not have further complaints of pain. Patient paces unit and is selectively social. Patient has poor eye contact and refuses to respond to this writer during assessment. Patient is observed responding to internal stimuli. Patient requested sleep medication. PRN trazodone 50mg was administered. Patient was compliant with scheduled medications. Staff to maintain continuous rounding for safety and support.

## 2023-08-12 NOTE — NURSING NOTE
She was bright and pleasant. She denies SI/HI and A/V hallucinations. Patient has been pacing the unit and was compliant with medication, and meals. Patient has been social with select peers. No behavioral issues noted.

## 2023-08-12 NOTE — PLAN OF CARE
Problem: Alteration in Thoughts and Perception  Goal: Verbalize thoughts and feelings  Description: Interventions:  - Promote a nonjudgmental and trusting relationship with the patient through active listening and therapeutic communication  - Assess patient's level of functioning, behavior and potential for risk  - Engage patient in 1 on 1 interactions  - Encourage patient to express fears, feelings, frustrations, and discuss symptoms    - [place] patient to reality, help patient recognize reality-based thinking   - Administer medications as ordered and assess for potential side effects  - Provide the patient education related to the signs and symptoms of the illness and desired effects of prescribed medications  Outcome: Progressing  Goal: Refrain from acting on delusional thinking/internal stimuli  Description: Interventions:  - Monitor patient closely, per order   - Utilize least restrictive measures   - Set reasonable limits, give positive feedback for acceptable   - Administer medications as ordered and monitor of potential side effects  Outcome: Progressing  Goal: Agree to be compliant with medication regime, as prescribed and report medication side effects  Description: Interventions:  - Offer appropriate PRN medication and supervise ingestion; conduct AIMS, as needed   Outcome: Progressing  Goal: Attend and participate in unit activities, including therapeutic, recreational, and educational groups  Description: Interventions:  -Encourage Visitation and family involvement in care  Outcome: Progressing     Problem: PSYCHOSIS  Goal: Will report no hallucinations or delusions  Description: Interventions:  - Administer medication as  ordered  - Every waking shifts and PRN assess for the presence of hallucinations and or delusions  - Assist with reality testing to support increasing orientation  - Assess if patient's hallucinations or delusions are encouraging self-harm or harm to others and intervene as appropriate  Outcome: Progressing     Problem: SLEEP DISTURBANCE  Goal: Will exhibit normal sleeping pattern  Description: Interventions:  -  Assess the patients sleep pattern, noting recent changes  - Administer medication as ordered  - Decrease environmental stimuli, including noise, as appropriate during the night  - Encourage the patient to actively participate in unit groups and or exercise during the day to enhance ability to achieve adequate sleep at night  - Assess the patient, in the morning, encouraging a description of sleep experience  Outcome: Progressing     Problem: INVOLUNTARY ADMIT  Goal: Will cooperate with staff recommendations and doctor's orders and will demonstrate appropriate behavior  Description: INTERVENTIONS:  - Treat underlying conditions and offer medication as ordered  - Educate regarding involuntary admission procedures and rules  - Utilize positive consistent limit setting strategies to support patient and staff safety  Outcome: Progressing

## 2023-08-12 NOTE — NURSING NOTE
Patient has requested to have her vitals taken multiple times during the day. Patient stated "I need to check it or take a shower.  I guess Ill take a shower."

## 2023-08-12 NOTE — PROGRESS NOTES
Progress Note - Behavioral Health   Ferris Cowden 28 y.o. female MRN: [id number]  Unit/Bed#: U 352-01 Encounter: [id number]    Assessment/Plan   Principal Problem:    Psychosis ([address]) rule out schizophrenia versus schizoaffective disorder  Active Problems:    Medical clearance for psychiatric admission    Mild tetrahydrocannabinol (THC) abuse    Recommended Treatment:   Continue medications at current doses as below. Encourage group therapy, milieu therapy and occupational therapy  All current active medications have been reviewed  Encourage group therapy, milieu therapy and occupational therapy  Behavioral Health checks every 7 minutes    ----------------------------------------      Subjective:   Per nursing report, patient has been bizarre but generally calm and cooperative. She remains medication compliant. She has been without any acute behavioral issues. Patient is pleasant and bright throughout interview. She reports that her mood is good. She notes that she needs to continue to sleep with the lights on due to feeling uncomfortable and darkness. However, she also reports that he finds difficult the light on makes things difficult to sleep. She reports that she did otherwise sleep fairly well last night. She denies any problems tolerating her medications. She denies any SI, HI, or AVH.     Behavior over the last 24 hours:  unchanged  Sleep: normal  Appetite: normal  Medication side effects: No  ROS: all other systems are negative    Mental Status Evaluation:  Appearance:  disheveled, dressed in hospital attire   Behavior:  pleasant, cooperative, calm, bizarre   Speech:  normal rate and volume   Mood:  euthymic   Affect:  normal range and intensity, appropriate   Thought Process:  goal directed, linear   Associations: concrete associations   Thought Content:  no overt delusions   Perceptual Disturbances: denies auditory or visual hallucinations when asked, does not appear responding to internal stimuli   Risk Potential: Suicidal ideation - None  Homicidal ideation - None  Potential for aggression - Not at present   Sensorium:  oriented to person, place, and time/date   Memory:  recent and remote memory grossly intact   Consciousness:  alert and awake   Attention/Concentration: attention span and concentration are age appropriate   Insight:  limited   Judgment: limited   Gait/Station: normal gait/station   Motor Activity: no abnormal movements     Medications: all current active meds have been reviewed.   Current Facility-Administered Medications   Medication Dose Route Frequency Provider Last Rate    acetaminophen  650 mg Oral Q6H PRN Milton Patterson MD      acetaminophen  650 mg Oral Q4H PRN Milton Patterson MD      acetaminophen  975 mg Oral Q6H PRN Milton Patterson MD      aluminum-magnesium hydroxide-simethicone  30 mL Oral Q4H PRN Milton Patterson MD      haloperidol lactate  2.5 mg Intramuscular Q4H PRN Max 4/day Milton Patterson MD      And    LORazepam  1 mg Intramuscular Q4H PRN Max 4/day Milton Patterson MD      And    benztropine  0.5 mg Intramuscular Q4H PRN Max 4/day Milton Patterson MD      haloperidol lactate  5 mg Intramuscular Q4H PRN Max 4/day Milton Patterson MD      And    LORazepam  2 mg Intramuscular Q4H PRN Max 4/day Milton Patterson MD      And    benztropine  1 mg Intramuscular Q4H PRN Max 4/day Milton Patterson MD      benztropine  1 mg Oral Q4H PRN Max 6/day Milton Patterson MD      bisacodyl  10 mg Rectal Daily PRN Milton Patterson MD      hydrOXYzine HCL  50 mg Oral Q6H PRN Max 4/day Milton Patterson MD      Or    diphenhydrAMINE  50 mg Intramuscular Q6H PRN Milton Patterson MD      ergocalciferol  50,000 Units Oral Weekly MARY Arzola      haloperidol  1 mg Oral Q6H PRN Milton Patterson MD      haloperidol  2.5 mg Oral Q4H PRN Max 4/day Milton Patterson MD      haloperidol  5 mg Oral Q4H PRN Max 4/day Milton Patterson MD      hydrOXYzine HCL  100 mg Oral Q6H PRN Max 4/day David Stafford MD      Or    LORazepam  2 mg Intramuscular Q6H PRN David Stafford MD      hydrOXYzine HCL  25 mg Oral Q6H PRN Max 4/day David Stafford MD      melatonin  3 mg Oral HS David Stafford MD      OLANZapine  10 mg Oral HS Clemencia Art DO      polyethylene glycol  17 g Oral Daily PRN David Stafford MD      senna-docusate sodium  1 tablet Oral Daily PRN David Stafford MD      traZODone  50 mg Oral HS PRN David Stafford MD         Labs: I have personally reviewed all pertinent laboratory/tests results  Most Recent Labs:   Lab Results   Component Value Date    WBC 3.91 (L) 08/08/2023    RBC 5.11 08/08/2023    HGB 14.0 08/08/2023    HCT 44.0 08/08/2023     08/08/2023    RDW 13.7 08/08/2023    NEUTROABS 2.05 08/08/2023    SODIUM 139 08/08/2023    K 3.6 08/08/2023     08/08/2023    CO2 28 08/08/2023    BUN 7 08/08/2023    CREATININE 0.86 08/08/2023    GLUC 118 08/08/2023    CALCIUM 9.2 08/08/2023    AST 42 (H) 08/08/2023    ALT 38 08/08/2023    ALKPHOS 38 08/08/2023    TP 7.0 08/08/2023    ALB 4.4 08/08/2023    TBILI 1.22 (H) 08/08/2023    CHOLESTEROL 242 (H) 08/08/2023    HDL 59 08/08/2023    TRIG 87 08/08/2023    LDLCALC 166 (H) 08/08/2023    [address] 08/08/2023    [id number] 2.129 08/08/2023       Progress Toward Goals: progressing    Risks / Benefits of Treatment:    Risks, benefits, and possible side effects of medications explained to patient and patient verbalizes understanding and agreement for treatment. Counseling / Coordination of Care:    Patient's progress discussed with staff in treatment team meeting. Medications, treatment progress and treatment plan reviewed with patient.     Moe Montes De Oca MD 08/12/23

## 2023-08-13 PROCEDURE — 99232 SBSQ HOSP IP/OBS MODERATE 35: CPT | Performed by: STUDENT IN AN ORGANIZED HEALTH CARE EDUCATION/TRAINING PROGRAM

## 2023-08-13 RX ORDER — OLANZAPINE 5 MG/1
5 TABLET, ORALLY DISINTEGRATING ORAL DAILY
Status: DISCONTINUED | OUTPATIENT
Start: 2023-08-14 | End: 2023-08-14

## 2023-08-13 RX ADMIN — Medication 3 MG: at 21:43

## 2023-08-13 RX ADMIN — OLANZAPINE 10 MG: 10 TABLET, ORALLY DISINTEGRATING ORAL at 21:42

## 2023-08-13 NOTE — PROGRESS NOTES
Progress Note - Behavioral Health   Lennard Galeazzi 28 y.o. female MRN: [id number]  Unit/Bed#: [facility] 342-01 Encounter: [id number]    Assessment/Plan   Principal Problem:    Psychosis ([address]) rule out schizophrenia versus schizoaffective disorder  Active Problems:    Medical clearance for psychiatric admission    Mild tetrahydrocannabinol (THC) abuse    Recommended Treatment:  Increase Zyprexa to 5 mg every morning and 10 mg nightly. Continue medications at current doses as below. Encourage group therapy, milieu therapy and occupational therapy  All current active medications have been reviewed  Encourage group therapy, milieu therapy and occupational therapy  Behavioral Health checks every 7 minutes    ----------------------------------------      Subjective:   Per nursing report, patient was up all night. She was somatic and perseverative. She has been medication compliant. Patient is somewhat disorganized and perseverative today on assessment, though is generally pleasant and bright during interview. She has limited insight into her bizarre behaviors and blames others for negative interactions. She reports frustration with difficulty sleeping at night. She denies any SI, HI, or AVH. Discussed with patient plan to increase Zyprexa to 5 mg every morning and 10 mg nightly. Patient expressed understanding of this.     Behavior over the last 24 hours:  unchanged  Sleep: insomnia  Appetite: normal  Medication side effects: No  ROS: no complaints and all other systems are negative    Mental Status Evaluation:  Appearance:  disheveled, marginal hygiene, dressed in hospital attire   Behavior:  pleasant, cooperative, calm   Speech:  normal rate and volume   Mood:  euthymic   Affect:  overbright, expansive   Thought Process:  disorganized   Associations: concrete associations   Thought Content:  somatic preoccupation   Perceptual Disturbances: denies auditory or visual hallucinations when asked, does not appear responding to internal stimuli   Risk Potential: Suicidal ideation - None  Homicidal ideation - None  Potential for aggression - Not at present   Sensorium:  oriented to person, place and time/date   Memory:  recent and remote memory grossly intact   Consciousness:  alert and awake   Attention/Concentration: attention span and concentration are age appropriate   Insight:  limited   Judgment: limited   Gait/Station: normal gait/station   Motor Activity: no abnormal movements     Medications: all current active meds have been reviewed.   Current Facility-Administered Medications   Medication Dose Route Frequency Provider Last Rate   • acetaminophen  650 mg Oral Q6H PRN Murali Pineda MD     • acetaminophen  650 mg Oral Q4H PRN Murali Pineda MD     • acetaminophen  975 mg Oral Q6H PRN Murali Pineda MD     • aluminum-magnesium hydroxide-simethicone  30 mL Oral Q4H PRN Murali Pineda MD     • haloperidol lactate  2.5 mg Intramuscular Q4H PRN Max 4/day Murali Pineda MD      And   • LORazepam  1 mg Intramuscular Q4H PRN Max 4/day Murali Pineda MD      And   • benztropine  0.5 mg Intramuscular Q4H PRN Max 4/day Murali Pineda MD     • haloperidol lactate  5 mg Intramuscular Q4H PRN Max 4/day Murali Pineda MD      And   • LORazepam  2 mg Intramuscular Q4H PRN Max 4/day Murali Pineda MD      And   • benztropine  1 mg Intramuscular Q4H PRN Max 4/day Murali Pineda MD     • benztropine  1 mg Oral Q4H PRN Max 6/day Murali Pineda MD     • bisacodyl  10 mg Rectal Daily PRN Murali Pineda MD     • hydrOXYzine HCL  50 mg Oral Q6H PRN Max 4/day Murali Pineda MD      Or   • diphenhydrAMINE  50 mg Intramuscular Q6H PRN Murali Pineda MD     • ergocalciferol  50,000 Units Oral Weekly MARY Arzola     • haloperidol  1 mg Oral Q6H PRN Murali Pineda MD     • haloperidol  2.5 mg Oral Q4H PRN Max 4/day Murali Pineda MD     • haloperidol  5 mg Oral Q4H PRN Max 4/day Murali Pineda MD     • hydrOXYzine HCL  100 mg Oral Q6H PRN Max 4/day Venita Nissen, MD      Or   • LORazepam  2 mg Intramuscular Q6H PRN Venita Nissen, MD     • hydrOXYzine HCL  25 mg Oral Q6H PRN Max 4/day Venita Nissen, MD     • melatonin  3 mg Oral HS Venita Nissen, MD     • OLANZapine  10 mg Oral HS Moise Shirley DO     • polyethylene glycol  17 g Oral Daily PRN Venita Nissen, MD     • senna-docusate sodium  1 tablet Oral Daily PRN Venita Nissen, MD     • traZODone  50 mg Oral HS PRN Venita Nissen, MD         Labs: I have personally reviewed all pertinent laboratory/tests results  Most Recent Labs:   Lab Results   Component Value Date    WBC 3.91 (L) 08/08/2023    RBC 5.11 08/08/2023    HGB 14.0 08/08/2023    HCT 44.0 08/08/2023     08/08/2023    RDW 13.7 08/08/2023    NEUTROABS 2.05 08/08/2023    SODIUM 139 08/08/2023    K 3.6 08/08/2023     08/08/2023    CO2 28 08/08/2023    BUN 7 08/08/2023    CREATININE 0.86 08/08/2023    GLUC 118 08/08/2023    CALCIUM 9.2 08/08/2023    AST 42 (H) 08/08/2023    ALT 38 08/08/2023    ALKPHOS 38 08/08/2023    TP 7.0 08/08/2023    ALB 4.4 08/08/2023    TBILI 1.22 (H) 08/08/2023    CHOLESTEROL 242 (H) 08/08/2023    HDL 59 08/08/2023    TRIG 87 08/08/2023    LDLCALC 166 (H) 08/08/2023    [address] 08/08/2023    [id number] 2.129 08/08/2023       Progress Toward Goals: progressing    Risks / Benefits of Treatment:    Risks, benefits, and possible side effects of medications explained to patient and patient verbalizes understanding and agreement for treatment. Counseling / Coordination of Care:    Patient's progress discussed with staff in treatment team meeting. Medications, treatment progress and treatment plan reviewed with patient.     Oliver Kramer MD 08/13/23

## 2023-08-13 NOTE — PROGRESS NOTES
[facility] Group Note     08/13/23 1300   Activity/Group Checklist   Group Life Skills  (Teamwork and Communication)   Attendance Attended   Attendance Duration (min) 31-45  (in and out of group)   Interactions Other (Comment)  (involved in a verbal altercation with peer over pronouncing her name, was able to calm self; did not participate in activity; utilized individual coping techniques)   Affect/Mood Incongruent  (Liable with irritable edge)   Goals Achieved Identified feelings; Able to listen to others; Able to engage in interactions; Able to recieve feedback; Able to give feedback to another  (benefited from social presence of the group)

## 2023-08-13 NOTE — NURSING NOTE
Patient had argument with staff as patient felt disrespected to which the staff member did not. Patient was argumentative and labile. Patient has been observed periodically talking to herself in her native language. She was re-directed from her behaviors and left the nursing station. Patient was then walking with peers, as well as coloring. Patient did not require PRN's for agitation. Patient calmer on milieu. She denies AVH/SI/HI. She is compliant. She is somatic asking to have her blood pressure and weight reassessed. Patient has no further needs.

## 2023-08-13 NOTE — NURSING NOTE
Patient has been pacing the unit off and on this evening and chatting in a friendly and appropriate manner. She denies S.I.H.I. A/H V/H

## 2023-08-14 PROCEDURE — 99232 SBSQ HOSP IP/OBS MODERATE 35: CPT | Performed by: PSYCHIATRY & NEUROLOGY

## 2023-08-14 RX ORDER — LANOLIN ALCOHOL/MO/W.PET/CERES
6 CREAM (GRAM) TOPICAL
Status: DISCONTINUED | OUTPATIENT
Start: 2023-08-14 | End: 2023-08-29 | Stop reason: HOSPADM

## 2023-08-14 RX ADMIN — SENNOSIDES AND DOCUSATE SODIUM 1 TABLET: 50; 8.6 TABLET ORAL at 08:35

## 2023-08-14 RX ADMIN — TRAZODONE HYDROCHLORIDE 50 MG: 50 TABLET ORAL at 21:04

## 2023-08-14 RX ADMIN — Medication 6 MG: at 21:04

## 2023-08-14 RX ADMIN — OLANZAPINE 15 MG: 10 TABLET, ORALLY DISINTEGRATING ORAL at 21:04

## 2023-08-14 NOTE — NURSING NOTE
Patient did not want to accept Zyprexa this pm as she does "not like what it does to my energy." She was encouraged to take her medication and she did  "but I have to talk to the Doctor about this tomorrow." She refuses to sleep in her room with a roommate but has been accomodated in another room.   She denies all symptoms and does not believe she needs to be in hospital.

## 2023-08-14 NOTE — TREATMENT TEAM
08/14/23 1300   Activity/Group Checklist   Group   (recovery group)   Attendance Attended   Attendance Duration (min) 46-60   Interactions Interacted appropriately   Affect/Mood Appropriate   Goals Achieved Discussed coping strategies; Discussed self-esteem issues; Displayed empathy;Able to listen to others; Able to engage in interactions; Able to reflect/comment on own behavior;Able to self-disclose; Able to recieve feedback

## 2023-08-14 NOTE — PROGRESS NOTES
Progress Note - Behavioral Health   Kindra Mcclure 28 y.o. female MRN: [id number]  Unit/Bed#: [facility] [address] Encounter: [id number]    Assessment/Plan   Principal Problem:    Psychosis ([address]) rule out schizophrenia versus schizoaffective disorder  Active Problems:    Medical clearance for psychiatric admission    Mild tetrahydrocannabinol (THC) abuse      Recommended Treatment:   - Adjust Zyprexa Zydis dose to 15 mg qHS for psychosis, mouth checks     Continue with group therapy, milieu therapy and occupational therapy. Continue frequent safety checks and vitals per unit protocol. Case discussed with treatment team.  Risks, benefits and possible side effects of Medications: Risks, benefits, and possible side effects of medications have been explained to the patient, who verbalizes understanding    Current Legal Status: 303  Disposition: TBD, coordinating with CM  ------------------------------------------------------------    Subjective: Per nursing report, patient shared she feels she needs to sleep with the light on due to feeling uncomfortable with the darkness but also reports having light on makes sleeping more difficult. She is noted to be somatically preoccupied, disorganized, labile at times. She requested to have her BP and weights checked. She is also seen talking to herself in her native language. Patient reportedly became disrespectful with staff member but did not require PRN medications. She was hesitant to take her p.m. Zyprexa yesterday, feels she does not need to be in the hospital and refuses to sleep in her room with roommate. She refused AM Zyprexa dose this morning. Today, Kamille Snow was seen and evaluated alongside attending physician. On evaluation, patient is irritable and expansive in affect, disorganized and tangential in thought process, appears internally preoccupied at times and remains paranoid. She reports feeling "alright" this morning and states she did not sleep.  Patient later contradicts herself and reports she slept well overnight and got an estimated 5 hours of sleep. Patient also asks again about "drugs in my body" and medications she is on despite thorough conversation regarding these questions on Friday. Discussed patients current medications and UDS on admission, including its limitations, again with patient. When patient had stated she was not sleeping well, patient was asked why and reports there are "bats" in her room and also because of the people and her room itself. Patient frustrated that she is still getting pork products in her room despite diet restrictions listed in the order. Per patient's technician, there were no pork or hannon products on her tray this morning. Patient reports feeling she is "wasting time" and "sucking my energy down" while being on the unit. Patient feels the marijuana will be in her body for "90 days" because "I was a pothead" and that "my body does not feel like my body". Patient expressing interest in rehab for her marijuana use. Patient remains paranoid regarding staff, treatment on the [address] She was frustrated her whiteboard in her room is not updated. She reports "you can put data in my computer but I need it back". When asked about events leading to admission and reminded she was found laying in the street, patient reports "they put me there" and "I don't know who they is". Patient acknowledges "I cannot control my thoughts" or her mind. Patient also feels she is being lied to and she is not actually in [facility] currently because she has been to [facility] before and when looking outside it does not look like [facility]. Patient denies SI/HI/AVH. She denies medication side effects at this time. Patient did report she has not had a BM in 3 days and received Senokot S in the morning.      Progress Toward Goals: slow improvement    Psychiatric Review of Systems:  Behavior over the last 24 hours: regressed  Sleep: decreased  Appetite: adequate  Medication side effects: none verbalized  ROS: Complete review of systems is negative except as noted above.     Vital signs in last 24 hours:   Temp:  [98 °F (36.7 °C)-98.3 °F (36.8 °C)] 98 °F (36.7 °C)  HR:  [] 108  Resp:  [16] 16  BP: (109-121)/(62-68) 109/62    Mental Status Exam:  Appearance:  alert, intermittent eye contact, appears stated age and marginal grooming/hygiene   Behavior:  calm, limited cooperativity, guarded at times and sitting comfortably   Motor: restless and fidgety and normal gait and balance   Speech:  spontaneous, clear, increased rate, loud at times and coherent   Mood:  "alright"   Affect:  Expansive, irritable   Thought Process:  disorganized, tangential at times   Thought Content: paranoid ideation, Holiness preoccupation; less somatically preoccupied   Perceptual disturbances: no reported hallucinations and does not appear to be responding to internal stimuli at this time; appears internally preoccupied at times; possibly VH of bats in her room   Risk Potential: No active or passive suicidal or homicidal ideation was verbalized during interview   Cognition: oriented to self and situation, appears to be of average intelligence and cognition not formally tested   Insight:  Poor   Judgment: Poor     Current Medications:  Current Facility-Administered Medications   Medication Dose Route Frequency Provider Last Rate   • acetaminophen  650 mg Oral Q6H PRN Will Carroll MD     • acetaminophen  650 mg Oral Q4H PRN Will Carroll MD     • acetaminophen  975 mg Oral Q6H PRN Will Carroll MD     • aluminum-magnesium hydroxide-simethicone  30 mL Oral Q4H PRN Will Carroll MD     • haloperidol lactate  2.5 mg Intramuscular Q4H PRN Max 4/day Will Carroll MD      And   • LORazepam  1 mg Intramuscular Q4H PRN Max 4/day Will Carroll MD      And   • benztropine  0.5 mg Intramuscular Q4H PRN Max 4/day Will Carroll MD     • haloperidol lactate  5 mg Intramuscular Q4H PRN Max 4/day Aliya Marin MD      And   • LORazepam  2 mg Intramuscular Q4H PRN Max 4/day Aliya Marin MD      And   • benztropine  1 mg Intramuscular Q4H PRN Max 4/day Aliya Marin MD     • benztropine  1 mg Oral Q4H PRN Max 6/day Aliya Marin MD     • bisacodyl  10 mg Rectal Daily PRN Aliya Marin MD     • hydrOXYzine HCL  50 mg Oral Q6H PRN Max 4/day Aliya Marin MD      Or   • diphenhydrAMINE  50 mg Intramuscular Q6H PRN Aliya Marin MD     • ergocalciferol  50,000 Units Oral Weekly MARY Arzola     • haloperidol  1 mg Oral Q6H PRN Aliya Marin MD     • haloperidol  2.5 mg Oral Q4H PRN Max 4/day Aliya Marin MD     • haloperidol  5 mg Oral Q4H PRN Max 4/day Aliya Marin MD     • hydrOXYzine HCL  100 mg Oral Q6H PRN Max 4/day Aliya Marin MD      Or   • LORazepam  2 mg Intramuscular Q6H PRN Aliya Marin MD     • hydrOXYzine HCL  25 mg Oral Q6H PRN Max 4/day Aliya Marin MD     • melatonin  6 mg Oral HS Clemencia Art DO     • OLANZapine  15 mg Oral HS Cora Cabezas DO     • polyethylene glycol  17 g Oral Daily PRN Aliya Marin MD     • senna-docusate sodium  1 tablet Oral Daily PRN Aliya Marin MD     • traZODone  50 mg Oral HS PRN Aliya Marin MD         Behavioral Health Medications: all current active meds have been reviewed. Changes as in plan section above. Laboratory results:  I have personally reviewed all pertinent laboratory/tests results. No results found for this or any previous visit (from the past 48 hour(s)).      Cora Cabezas DO

## 2023-08-14 NOTE — NURSING NOTE
Patient reports that PRN of Senokot one tablet po has not yet been effective. Continues to c/o constipation.

## 2023-08-14 NOTE — PROGRESS NOTES
08/14/23 0852   Team Meeting   Meeting Type Daily Rounds   Team Members Present   Team Members Present Physician;Nurse;   Physician Team Member [id number] Member Glad[place]   Patient/Family Present   Patient Present No   Patient's Family Present No     Pt refused Zyprexa this morning, stating she only wants to take it at night. Pt visible on the unit, pleasant, cooperative. Initially refusing Zyprexa last night but did agree to take it. Pt refusing to sleep in room d/t roommate. Discharge to be determined.

## 2023-08-14 NOTE — NURSING NOTE
Patient refused morning dose of Zyprexa stating that it makes her too tired. Would prefer to take it at bedtime. Feels that she does not need to be hospitalized and that she is "losing energy" by being here. Disorganized, bizarre. Required redirection throughout the day after arguing with several peers on the unit. Did not require any PRNs as of this time. Continues to report that Senokot has not yet been effective.

## 2023-08-14 NOTE — PLAN OF CARE
Problem: DISCHARGE PLANNING  Goal: Discharge to home or other facility with appropriate resources  Description: INTERVENTIONS:  - Identify barriers to discharge w/patient and caregiver  - Arrange for needed discharge resources and transportation as appropriate  - Identify discharge learning needs (meds, wound care, etc.)  - Arrange for interpretive services to assist at discharge as needed  - Refer to Case Management Department for coordinating discharge planning if the patient needs post-hospital services based on physician/advanced practitioner order or complex needs related to functional status, cognitive ability, or social support system  Outcome: Progressing     Problem: Alteration in Thoughts and Perception  Goal: Verbalize thoughts and feelings  Description: Interventions:  - Promote a nonjudgmental and trusting relationship with the patient through active listening and therapeutic communication  - Assess patient's level of functioning, behavior and potential for risk  - Engage patient in 1 on 1 interactions  - Encourage patient to express fears, feelings, frustrations, and discuss symptoms    - [place] patient to reality, help patient recognize reality-based thinking   - Administer medications as ordered and assess for potential side effects  - Provide the patient education related to the signs and symptoms of the illness and desired effects of prescribed medications  Outcome: Progressing  Goal: Refrain from acting on delusional thinking/internal stimuli  Description: Interventions:  - Monitor patient closely, per order   - Utilize least restrictive measures   - Set reasonable limits, give positive feedback for acceptable   - Administer medications as ordered and monitor of potential side effects  Outcome: Progressing  Goal: Agree to be compliant with medication regime, as prescribed and report medication side effects  Description: Interventions:  - Offer appropriate PRN medication and supervise ingestion; conduct AIMS, as needed   Outcome: Progressing  Goal: Attend and participate in unit activities, including therapeutic, recreational, and educational groups  Description: Interventions:  -Encourage Visitation and family involvement in care  Outcome: Progressing     Problem: MAGEN  Goal: Will exhibit normal sleep and speech and no impulsivity  Description: INTERVENTIONS:  - Administer medication as ordered  - Set limits on impulsive behavior  - Make attempts to decrease external stimuli as possible  Outcome: Progressing     Problem: PSYCHOSIS  Goal: Will report no hallucinations or delusions  Description: Interventions:  - Administer medication as  ordered  - Every waking shifts and PRN assess for the presence of hallucinations and or delusions  - Assist with reality testing to support increasing orientation  - Assess if patient's hallucinations or delusions are encouraging self-harm or harm to others and intervene as appropriate  Outcome: Progressing     Problem: SLEEP DISTURBANCE  Goal: Will exhibit normal sleeping pattern  Description: Interventions:  -  Assess the patients sleep pattern, noting recent changes  - Administer medication as ordered  - Decrease environmental stimuli, including noise, as appropriate during the night  - Encourage the patient to actively participate in unit groups and or exercise during the day to enhance ability to achieve adequate sleep at night  - Assess the patient, in the morning, encouraging a description of sleep experience  Outcome: Progressing     Problem: INVOLUNTARY ADMIT  Goal: Will cooperate with staff recommendations and doctor's orders and will demonstrate appropriate behavior  Description: INTERVENTIONS:  - Treat underlying conditions and offer medication as ordered  - Educate regarding involuntary admission procedures and rules  - Utilize positive consistent limit setting strategies to support patient and staff safety  Outcome: Progressing     Problem: SAFETY, RESTRAINT - VIOLENT/SELF-DESTRUCTIVE  Goal: Remains free of harm/injury from restraints (Restraint for Violent/Self-Destructive Behavior)  Description: INTERVENTIONS:  - Instruct patient/family regarding restraint use   - Assess and monitor physiologic and psychological status   - Provide interventions and comfort measures to meet assessed patient needs   - Ensure continuous in person monitoring is provided   - Identify and implement measures to help patient regain control  - Assess readiness for release of restraint  Outcome: Progressing  Goal: Returns to optimal restraint-free functioning  Description: INTERVENTIONS:  - Assess the patient's behavior and symptoms that indicate continued need for restraint  - Identify and implement measures to help patient regain control  - Assess readiness for release of restraint   Outcome: Progressing     Problem: Nutrition/Hydration-ADULT  Goal: Nutrient/Hydration intake appropriate for improving, restoring or maintaining nutritional needs  Description: Monitor and assess patient's nutrition/hydration status for malnutrition. Collaborate with interdisciplinary team and initiate plan and interventions as ordered. Monitor patient's weight and dietary intake as ordered or per policy. Utilize nutrition screening tool and intervene as necessary. Determine patient's food preferences and provide high-protein, high-caloric foods as appropriate.      INTERVENTIONS:  - Monitor oral intake, urinary output, labs, and treatment plans  - Assess nutrition and hydration status and recommend course of action  - Evaluate amount of meals eaten  - Assist patient with eating if necessary   - Allow adequate time for meals  - Recommend/ encourage appropriate diets, oral nutritional supplements, and vitamin/mineral supplements  - Order, calculate, and assess calorie counts as needed  - Recommend, monitor, and adjust tube feedings and TPN/PPN based on assessed needs  - Assess need for intravenous fluids  - Provide specific nutrition/hydration education as appropriate  - Include patient/family/caregiver in decisions related to nutrition  Outcome: Progressing

## 2023-08-15 PROCEDURE — 99232 SBSQ HOSP IP/OBS MODERATE 35: CPT | Performed by: PSYCHIATRY & NEUROLOGY

## 2023-08-15 RX ORDER — AMOXICILLIN 250 MG
1 CAPSULE ORAL 2 TIMES DAILY
Status: DISCONTINUED | OUTPATIENT
Start: 2023-08-15 | End: 2023-08-21

## 2023-08-15 RX ORDER — HALOPERIDOL 5 MG/ML
5 INJECTION INTRAMUSCULAR ONCE
Status: COMPLETED | OUTPATIENT
Start: 2023-08-15 | End: 2023-08-15

## 2023-08-15 RX ADMIN — HALOPERIDOL LACTATE 5 MG: 5 INJECTION, SOLUTION INTRAMUSCULAR at 18:51

## 2023-08-15 RX ADMIN — SENNOSIDES AND DOCUSATE SODIUM 1 TABLET: 50; 8.6 TABLET ORAL at 14:18

## 2023-08-15 RX ADMIN — OLANZAPINE 15 MG: 10 TABLET, ORALLY DISINTEGRATING ORAL at 21:01

## 2023-08-15 RX ADMIN — BENZTROPINE MESYLATE 1 MG: 1 INJECTION INTRAMUSCULAR; INTRAVENOUS at 18:51

## 2023-08-15 RX ADMIN — HALOPERIDOL LACTATE 5 MG: 5 INJECTION, SOLUTION INTRAMUSCULAR at 17:25

## 2023-08-15 RX ADMIN — LORAZEPAM 2 MG: 2 INJECTION INTRAMUSCULAR; INTRAVENOUS at 18:30

## 2023-08-15 NOTE — TREATMENT TEAM
08/15/23 0628   Activity/Group Checklist   Group Community meeting   Attendance Attended  (came late was with MD)   Attendance Duration (min) 0-15   Interactions Interacted appropriately   Affect/Mood Appropriate   Goals Achieved Able to listen to others; Able to engage in interactions; Able to self-disclose; Able to recieve feedback

## 2023-08-15 NOTE — NURSING NOTE
Patient is visible and argumentative with staff and peers. Labile and irritable on approach. Denies SI, HI, A/V hallucinations although is disorganized in thought. Patient increasingly agitated and irritable and unable to be redirected by staff. Patient continued to complain and throw cup of water down to the ground d/t rice not coming up fast enough from the kitchen. Patient unable to de-escalate self, slamming room doors and refused PO medication. 1725 - PRN Haldol 5 mg IM administered with security present. [id number] [id number] - Patient continued to be irritable and pacing the unit talking to self. Patient proceeded to go into dayroom and throw cup of ice water to peers unprovoked. Continued threatning and unable to follow verbal redirection by staff. 1830 - PRN Ativan 2 mg IM administered. Patient placed in locked seclusion. 1851 - PRN Haldol 5 mg IM and cogentin 1 mg IM administered.

## 2023-08-15 NOTE — PROGRESS NOTES
08/15/23 1301   Team Meeting   Meeting Type Daily Rounds   Team Members Present   Team Members Present Physician;Nurse;   Physician Team Member [id number] Member Gladis   Patient/Family Present   Patient Present No   Patient's Family Present No     Pt visible on the unit. Focused on discharge and receiving her personal belongings. Pt irritable with roommate. Superficially bright. Discharge to be determined.

## 2023-08-15 NOTE — NURSING NOTE
Labile, irritable. Argumentative with select peers on the unit and requires redirection frequently. Continues to feel that she is "losing energy" taking the medications that are prescribed.

## 2023-08-15 NOTE — PLAN OF CARE
Problem: Alteration in Thoughts and Perception  Goal: Verbalize thoughts and feelings  Description: Interventions:  - Promote a nonjudgmental and trusting relationship with the patient through active listening and therapeutic communication  - Assess patient's level of functioning, behavior and potential for risk  - Engage patient in 1 on 1 interactions  - Encourage patient to express fears, feelings, frustrations, and discuss symptoms    - [place] patient to reality, help patient recognize reality-based thinking   - Administer medications as ordered and assess for potential side effects  - Provide the patient education related to the signs and symptoms of the illness and desired effects of prescribed medications  Outcome: Progressing  Goal: Refrain from acting on delusional thinking/internal stimuli  Description: Interventions:  - Monitor patient closely, per order   - Utilize least restrictive measures   - Set reasonable limits, give positive feedback for acceptable   - Administer medications as ordered and monitor of potential side effects  Outcome: Progressing  Goal: Agree to be compliant with medication regime, as prescribed and report medication side effects  Description: Interventions:  - Offer appropriate PRN medication and supervise ingestion; conduct AIMS, as needed   Outcome: Progressing  Goal: Attend and participate in unit activities, including therapeutic, recreational, and educational groups  Description: Interventions:  -Encourage Visitation and family involvement in care  Outcome: Progressing     Problem: MAGEN  Goal: Will exhibit normal sleep and speech and no impulsivity  Description: INTERVENTIONS:  - Administer medication as ordered  - Set limits on impulsive behavior  - Make attempts to decrease external stimuli as possible  Outcome: Progressing     Problem: PSYCHOSIS  Goal: Will report no hallucinations or delusions  Description: Interventions:  - Administer medication as  ordered  - Every waking shifts and PRN assess for the presence of hallucinations and or delusions  - Assist with reality testing to support increasing orientation  - Assess if patient's hallucinations or delusions are encouraging self-harm or harm to others and intervene as appropriate  Outcome: Progressing     Problem: SLEEP DISTURBANCE  Goal: Will exhibit normal sleeping pattern  Description: Interventions:  -  Assess the patients sleep pattern, noting recent changes  - Administer medication as ordered  - Decrease environmental stimuli, including noise, as appropriate during the night  - Encourage the patient to actively participate in unit groups and or exercise during the day to enhance ability to achieve adequate sleep at night  - Assess the patient, in the morning, encouraging a description of sleep experience  Outcome: Progressing     Problem: INVOLUNTARY ADMIT  Goal: Will cooperate with staff recommendations and doctor's orders and will demonstrate appropriate behavior  Description: INTERVENTIONS:  - Treat underlying conditions and offer medication as ordered  - Educate regarding involuntary admission procedures and rules  - Utilize positive consistent limit setting strategies to support patient and staff safety  Outcome: Progressing

## 2023-08-15 NOTE — PROGRESS NOTES
Progress Note - Behavioral Health   Sangeeta Sanderson 28 y.o. female MRN: [id number]  Unit/Bed#: [facility] 850-81 Encounter: [id number]    Assessment/Plan   Principal Problem:    Psychosis ([address]) rule out schizophrenia versus schizoaffective disorder  Active Problems:    Medical clearance for psychiatric admission    Mild tetrahydrocannabinol (THC) abuse      Recommended Treatment:   Continue Zyprexa Zydis 15 mg qHS for psychosis, mouth checks   Initiate senokot S 1 tablet BID for bowel regimen    Continue with group therapy, milieu therapy and occupational therapy. Continue frequent safety checks and vitals per unit protocol. Case discussed with treatment team.  Risks, benefits and possible side effects of Medications: Risks, benefits, and possible side effects of medications have been explained to the patient, who verbalizes understanding    Current Legal Status: 303  Disposition:   ------------------------------------------------------------    Subjective: Per nursing report, Jazmín Wilde has been paranoid, irritable, agitated on the unit. She is noted to be disorganized and argumentative towards staff when told she cannot sleep in the small TV room. She was compliant with scheduled medications. PRNs: Trazodone 50 mg for insomnia     This writer attempted to see patient earlier in the morning but patient walked away and wanted attending physician to be present. Jazmín Wilde was later seen and evaluated alongside attending physician. On evaluation, patient is constricted and irritable in affect, circumstantial and disorganized in thought process, appears internally preoccupied at times and suspicious of care team/staff. She reports feeling "not normal" this morning. Patient states she was able to sleep for "5 hours only" due to her medications and feels as if her "body is not my body". She is perseverative on medications despite psychoeducation and feels she does not need medications other than melatonin.  Patient reports her "thoughts been going on and on" her mind "is going crazy". She reports an adequate appetite for breakfast this morning and reports there was no hannon or pork products in the meal today. She reports a small bowel movement today but is not moving bowels as regularly. Patient denies SI/HI/AVH at this time. Progress Toward Goals: slow improvement    Psychiatric Review of Systems:  Behavior over the last 24 hours: unchanged  Sleep: decreased  Appetite: improving  Medication side effects: none verbalized  ROS: Complete review of systems is negative except as noted above.     Vital signs in last 24 hours:   Temp:  [97 °F (36.1 °C)-97.7 °F (36.5 °C)] 97 °F (36.1 °C)  HR:  [82-90] 90  Resp:  [16-18] 18  BP: (108-151)/(59-66) 151/66    Mental Status Exam:  Appearance:  alert, good eye contact, appears stated age, marginal grooming/hygiene, disheveled and dressed in hospital attire   Behavior:  calm, limited cooperativity and sitting comfortably, suspicious   Motor: restless and fidgety at times and normal gait and balance   Speech:  spontaneous, clear, increased rate at times, loud at times, and coherent   Mood:  "not normal"   Affect:  blunted and irritable   Thought Process:  disorganized, circumstantial, racing of thoughts   Thought Content: paranoid ideation; somatic preoccupation and Restorationism preoccupations at times   Perceptual disturbances: no reported hallucinations and does not appear to be responding to internal stimuli at this time; appears internally preoccupied at times    Risk Potential: No active or passive suicidal or homicidal ideation was verbalized during interview   Cognition: oriented to self and situation, appears to be of average intelligence and cognition not formally tested   Insight:  Poor   Judgment: Poor     Current Medications:  Current Facility-Administered Medications   Medication Dose Route Frequency Provider Last Rate   • acetaminophen  650 mg Oral Q6H PRN Apollo Renteria MD     • acetaminophen  650 mg Oral Q4H PRN Kelley Sagastume MD     • acetaminophen  975 mg Oral Q6H PRN Kelley Sagastume MD     • aluminum-magnesium hydroxide-simethicone  30 mL Oral Q4H PRN Kelley Sagastume MD     • haloperidol lactate  2.5 mg Intramuscular Q4H PRN Max 4/day Kelley Sagastume MD      And   • LORazepam  1 mg Intramuscular Q4H PRN Max 4/day Kelley Sagastume MD      And   • benztropine  0.5 mg Intramuscular Q4H PRN Max 4/day Kelley Sagastume MD     • haloperidol lactate  5 mg Intramuscular Q4H PRN Max 4/day Kelley Sagastume MD      And   • LORazepam  2 mg Intramuscular Q4H PRN Max 4/day Kelley Sagastume MD      And   • benztropine  1 mg Intramuscular Q4H PRN Max 4/day Kelley Sagastume MD     • benztropine  1 mg Oral Q4H PRN Max 6/day Kelley Sagastume MD     • bisacodyl  10 mg Rectal Daily PRN Kelley Sagastume MD     • hydrOXYzine HCL  50 mg Oral Q6H PRN Max 4/day Kelley Sagastume MD      Or   • diphenhydrAMINE  50 mg Intramuscular Q6H PRN Kelley Sagastume MD     • ergocalciferol  50,000 Units Oral Weekly MARY Arzola     • haloperidol  1 mg Oral Q6H PRN Kelley Sagastume MD     • haloperidol  2.5 mg Oral Q4H PRN Max 4/day Kelley Sagastume MD     • haloperidol  5 mg Oral Q4H PRN Max 4/day Kelley Sagastume MD     • hydrOXYzine HCL  100 mg Oral Q6H PRN Max 4/day Kelley Sagastume MD      Or   • LORazepam  2 mg Intramuscular Q6H PRN Kelley Sagastume MD     • hydrOXYzine HCL  25 mg Oral Q6H PRN Max 4/day Kelley Sagastume MD     • melatonin  6 mg Oral HS Clemencia Art, DO     • OLANZapine  15 mg Oral HS Clemencia Art, DO     • polyethylene glycol  17 g Oral Daily PRN Kelley Sagastume MD     • senna-docusate sodium  1 tablet Oral Daily PRN Kelley Sagastume MD     • senna-docusate sodium  1 tablet Oral BID Robin Bellow, DO     • traZODone  50 mg Oral HS PRN Kelley Sagastume MD         Behavioral Health Medications: all current active meds have been reviewed.  Changes as in plan section above. Laboratory results:  I have personally reviewed all pertinent laboratory/tests results. No results found for this or any previous visit (from the past 48 hour(s)).      Wilbur Cranker, DO

## 2023-08-15 NOTE — RESTRAINT FACE TO FACE
Restraint Face to Face   Fernando Fountain 28 y.o. female MRN: [id number]  Unit/Bed#: [facility] 342-02 Encounter: [id number]      Physical Evaluation-no outward signs of outward distress  Purpose for Restraints/ Seclusion High risk for causing significant disruption of treatment environment , High risk for harm to others and high risk for flight  Patient's reaction to the intervention-pt combative, and yelling at staff  Patient's medical condition-stable  Patient's Behavioral condition-unable to follow verbal redirections  Restraints to be Continued

## 2023-08-15 NOTE — PLAN OF CARE
Problem: Ineffective Coping  Goal: Participates in unit activities  Description: Interventions:  - Provide therapeutic environment   - Provide required programming   - Redirect inappropriate behaviors   Outcome: Progressing     Problem: PSYCHOSIS  Goal: Will report no hallucinations or delusions  Description: Interventions:  - Administer medication as  ordered  - Every waking shifts and PRN assess for the presence of hallucinations and or delusions  - Assist with reality testing to support increasing orientation  - Assess if patient's hallucinations or delusions are encouraging self-harm or harm to others and intervene as appropriate  Outcome: Progressing     Problem: Alteration in Thoughts and Perception  Goal: Verbalize thoughts and feelings  Description: Interventions:  - Promote a nonjudgmental and trusting relationship with the patient through active listening and therapeutic communication  - Assess patient's level of functioning, behavior and potential for risk  - Engage patient in 1 on 1 interactions  - Encourage patient to express fears, feelings, frustrations, and discuss symptoms    - [place] patient to reality, help patient recognize reality-based thinking   - Administer medications as ordered and assess for potential side effects  - Provide the patient education related to the signs and symptoms of the illness and desired effects of prescribed medications  Outcome: Progressing  Goal: Refrain from acting on delusional thinking/internal stimuli  Description: Interventions:  - Monitor patient closely, per order   - Utilize least restrictive measures   - Set reasonable limits, give positive feedback for acceptable   - Administer medications as ordered and monitor of potential side effects  Outcome: Progressing  Goal: Agree to be compliant with medication regime, as prescribed and report medication side effects  Description: Interventions:  - Offer appropriate PRN medication and supervise ingestion; conduct AIMS, as needed   Outcome: Progressing  Goal: Attend and participate in unit activities, including therapeutic, recreational, and educational groups  Description: Interventions:  -Encourage Visitation and family involvement in care  Outcome: Progressing

## 2023-08-15 NOTE — NURSING NOTE
PT observed very paranoid, irritable and agitated over every attempt to have a conversation with, PT stated "I am losing all my energy, lack of sleep", disorganized, asking about her personal belonging, wants to leave. Provider asked to hold- off giving PT her clothing at this time. PT compliant with scheduled meds, added PRN  TraZoDone 50mg for insomnia. PT denies SI/HI/VH/AH, PT became argumentative towards staff when told that she cannot sleeping in "Small TV room as she asked to use it for sleep tonight. Able to return back to room with any further issue.

## 2023-08-16 PROCEDURE — 99232 SBSQ HOSP IP/OBS MODERATE 35: CPT | Performed by: PSYCHIATRY & NEUROLOGY

## 2023-08-16 RX ORDER — OLANZAPINE 10 MG/1
20 TABLET, ORALLY DISINTEGRATING ORAL
Status: DISCONTINUED | OUTPATIENT
Start: 2023-08-16 | End: 2023-08-21

## 2023-08-16 RX ADMIN — SENNOSIDES AND DOCUSATE SODIUM 1 TABLET: 50; 8.6 TABLET ORAL at 08:22

## 2023-08-16 RX ADMIN — SENNOSIDES AND DOCUSATE SODIUM 1 TABLET: 50; 8.6 TABLET ORAL at 17:05

## 2023-08-16 RX ADMIN — OLANZAPINE 20 MG: 10 TABLET, ORALLY DISINTEGRATING ORAL at 21:02

## 2023-08-16 RX ADMIN — Medication 3 MG: at 21:02

## 2023-08-16 RX ADMIN — TRAZODONE HYDROCHLORIDE 50 MG: 50 TABLET ORAL at 21:02

## 2023-08-16 RX ADMIN — ERGOCALCIFEROL 50000 UNITS: 1.25 CAPSULE ORAL at 08:22

## 2023-08-16 NOTE — NURSING NOTE
Pt lying in bed on approach. During interview pt denied SI, HI, and hallucinations. Irritable edge noted. Compliant with scheduled senna administration however stated "I'm not taking this shit again I can drink more water if I'm constipated." writer encouraged patient to drink more water and was also educated on the importance of maintaining medication regimen.

## 2023-08-16 NOTE — NURSING NOTE
About the unit. Compliant with morning medications. Labile; irritable at times. Doing laps in the hallway talking to self. Thoughts remain disorganized. Focused on "getting her energy back".

## 2023-08-16 NOTE — NURSING NOTE
PRN ativan 2mg IM ineffective upon reassessment @ 19:30. Pt remains anxious. PRN haldol 5mg and cogentin 1mg IMs ineffective upon reassessment @ 19:51. Pt continues to be agitated and disorganized. Pt refuses education and denies accountability, blaming and verbally targeting specific peers. Pt demanding to be released from seclusion, hitting window as nurse is explaining discontinuation criteria. Pt is not behaviorally appropriate at this time and will be reassessed shortly.

## 2023-08-16 NOTE — PROGRESS NOTES
Progress Note - Behavioral Health   Joaquina Stone 28 y.o. female MRN: [id number]  Unit/Bed#: [facility] 478-18 Encounter: [id number]    Assessment/Plan   Principal Problem:    Psychosis ([address]) rule out schizophrenia versus schizoaffective disorder  Active Problems:    Medical clearance for psychiatric admission    Mild tetrahydrocannabinol (THC) abuse      Recommended Treatment:   Increase Zyprexa Zydis to 20 mg nightly for psychosis, mouth checks    Continue with group therapy, milieu therapy and occupational therapy. Continue frequent safety checks and vitals per unit protocol. Case discussed with treatment team.  Risks, benefits and possible side effects of Medications: Risks, benefits, and possible side effects of medications have been explained to the patient, who verbalizes understanding    Current Legal Status: 303  Disposition: TBD, coordinating with   ------------------------------------------------------------    Subjective: Per nursing report, Cailin Solis has been disorganized, labile, irritable and argumentative with staff and peers. She was noted to throw a cup of water onto the ground due to her right is not coming up fast enough from the kitchen. She is unable to escalate and was slamming doors, refusing p.o. medication. At 1830, patient received Ativan 2 mg IM and was placed in locked seclusion. At 1851, patient received Haldol 5 mg IM and Cogentin 1 mg IM. Today, Cailin Solis was seen and evaluated for conservative care. On evaluation, patient is less circumstantial, less disorganized and more pleasant and less irritable, more cooperative. Patient also appears less paranoid. She reports feeling "good" this morning. Patient states she got an estimated 6 hours of sleep overnight. She describes her energy levels as "80%-I need 20 more". She reports an adequate appetite for breakfast this morning and states there is no pork or hannon.   When asked about events and behaviors yesterday, patient reports she received 3 shots in her arm and she became impatient with having to wait 10 minutes for food. Patient reports that since she did not have much to eat during the day she was very hungry for dinner. Patient was able to acknowledge that "yesterday was not my day" and she will "try to focus, stay out of trouble, get a good night sleep, good meal" and attend groups later on in the day. She states she is planning to attend medications group in art group. Progress Toward Goals: slow improvement    Psychiatric Review of Systems:  Behavior over the last 24 hours: regressed  Sleep: improving  Appetite: improving  Medication side effects: none verbalized  ROS: Complete review of systems is negative except as noted above.     Vital signs in last 24 hours:  Temp:  [97.4 °F (36.3 °C)-97.6 °F (36.4 °C)] 97.4 °F (36.3 °C)  HR:  [77-88] 88  Resp:  [16-18] 16  BP: (112-113)/(60-67) 112/67    Mental Status Exam:  Appearance:  alert, appears stated age, marginal grooming/hygiene, disheveled and Dressed in hospital attire   Behavior:  calm, more cooperative and sitting comfortably, less suspicious   Motor: no abnormal movements and normal gait and balance   Speech:  spontaneous, clear, normal rate, normal volume and coherent   Mood:  "Good"   Affect:  Less blunted, less irritable and brighter than previous   Thought Process:  Less disorganized, less circumstantial   Thought Content: Less paranoid ideation, less overtly somatic or religiously preoccupied   Perceptual disturbances: no reported hallucinations and does not appear to be responding to internal stimuli at this time; less internally preoccupied   Risk Potential: No active or passive suicidal or homicidal ideation was verbalized during interview, Low potential for aggression based on previous behavior   Cognition: oriented to self and situation, appears to be of average intelligence and cognition not formally tested   Insight:  Limited   Judgment: Poor     Current Medications:  Current Facility-Administered Medications   Medication Dose Route Frequency Provider Last Rate   • acetaminophen  650 mg Oral Q6H PRN Best Iyer MD     • acetaminophen  650 mg Oral Q4H PRN Best Iyer MD     • acetaminophen  975 mg Oral Q6H PRN Best Iyer MD     • aluminum-magnesium hydroxide-simethicone  30 mL Oral Q4H PRN Best Iyer MD     • haloperidol lactate  2.5 mg Intramuscular Q4H PRN Max 4/day Best Iyer MD      And   • LORazepam  1 mg Intramuscular Q4H PRN Max 4/day Best Iyer MD      And   • benztropine  0.5 mg Intramuscular Q4H PRN Max 4/day Best Iyer MD     • haloperidol lactate  5 mg Intramuscular Q4H PRN Max 4/day Best Iyer MD      And   • LORazepam  2 mg Intramuscular Q4H PRN Max 4/day Best Iyer MD      And   • benztropine  1 mg Intramuscular Q4H PRN Max 4/day Best Iyer MD     • benztropine  1 mg Oral Q4H PRN Max 6/day Best Iyer MD     • bisacodyl  10 mg Rectal Daily PRN Best Iyer MD     • hydrOXYzine HCL  50 mg Oral Q6H PRN Max 4/day Best Iyer MD      Or   • diphenhydrAMINE  50 mg Intramuscular Q6H PRN Best Iyer MD     • ergocalciferol  50,000 Units Oral Weekly MARY Arzola     • haloperidol  1 mg Oral Q6H PRN Best Iyer MD     • haloperidol  2.5 mg Oral Q4H PRN Max 4/day Best Iyer MD     • haloperidol  5 mg Oral Q4H PRN Max 4/day Best Iyer MD     • hydrOXYzine HCL  100 mg Oral Q6H PRN Max 4/day Best Iyer MD      Or   • LORazepam  2 mg Intramuscular Q6H PRN Best Iyer MD     • hydrOXYzine HCL  25 mg Oral Q6H PRN Max 4/day Best Iyer MD     • melatonin  6 mg Oral HS Clemencia Art, DO     • OLANZapine  20 mg Oral HS Ankushshanice Parrish, DO     • polyethylene glycol  17 g Oral Daily PRN Best Iyer MD     • senna-docusate sodium  1 tablet Oral Daily PRN Best Iyer, MD     • senna-docusate sodium  1 tablet Oral BID Clemencia DO Rubens     • traZODone  50 mg Oral HS PRN Fidelina Post MD         Behavioral Health Medications: all current active meds have been reviewed. Changes as in plan section above. Laboratory results:  I have personally reviewed all pertinent laboratory/tests results. No results found for this or any previous visit (from the past 48 hour(s)).      Nir Cortez DO

## 2023-08-16 NOTE — TREATMENT TEAM
08/16/23 [id number]   Activity/Group Checklist   Group Community meeting   Attendance Attended   Attendance Duration (min) 16-30   Interactions Interacted appropriately   Affect/Mood Appropriate   Goals Achieved Able to listen to others; Able to engage in interactions; Able to self-disclose; Able to recieve feedback

## 2023-08-16 NOTE — NURSING NOTE
Locked seclusion restraint discontinued @ 20:46, debriefing occurred. Pt currently irritable in speech but demonstrating appropriate behaviors. Pt able to verbalize that she will walk away when peers upset her. Pt given hydration, refused snack. Pt remains on continual observation, close proximity for now.

## 2023-08-16 NOTE — PROGRESS NOTES
08/16/23 0833   Team Meeting   Meeting Type Daily Rounds   Team Members Present   Team Members Present Physician;Nurse;   Physician Team Member [id number] Member [place]is   Patient/Family Present   Patient Present No   Patient's Family Present No     Pt placed in locked seclusion yesterday, angry, arguing with staff and peers, slamming doors, refusing PRN medications. Received PRN IM Haldol. Pt entered day room and threw cup of water at peers resulting in locked seclusion. Compliant with scheduled HS meds but refused melatonin. Discharge to be determined.

## 2023-08-16 NOTE — PLAN OF CARE
Problem: DISCHARGE PLANNING  Goal: Discharge to home or other facility with appropriate resources  Description: INTERVENTIONS:  - Identify barriers to discharge w/patient and caregiver  - Arrange for needed discharge resources and transportation as appropriate  - Identify discharge learning needs (meds, wound care, etc.)  - Arrange for interpretive services to assist at discharge as needed  - Refer to Case Management Department for coordinating discharge planning if the patient needs post-hospital services based on physician/advanced practitioner order or complex needs related to functional status, cognitive ability, or social support system  Outcome: Progressing     Problem: Alteration in Thoughts and Perception  Goal: Verbalize thoughts and feelings  Description: Interventions:  - Promote a nonjudgmental and trusting relationship with the patient through active listening and therapeutic communication  - Assess patient's level of functioning, behavior and potential for risk  - Engage patient in 1 on 1 interactions  - Encourage patient to express fears, feelings, frustrations, and discuss symptoms    - [place] patient to reality, help patient recognize reality-based thinking   - Administer medications as ordered and assess for potential side effects  - Provide the patient education related to the signs and symptoms of the illness and desired effects of prescribed medications  Outcome: Progressing  Goal: Refrain from acting on delusional thinking/internal stimuli  Description: Interventions:  - Monitor patient closely, per order   - Utilize least restrictive measures   - Set reasonable limits, give positive feedback for acceptable   - Administer medications as ordered and monitor of potential side effects  Outcome: Progressing  Goal: Agree to be compliant with medication regime, as prescribed and report medication side effects  Description: Interventions:  - Offer appropriate PRN medication and supervise ingestion; conduct AIMS, as needed   Outcome: Progressing  Goal: Attend and participate in unit activities, including therapeutic, recreational, and educational groups  Description: Interventions:  -Encourage Visitation and family involvement in care  Outcome: Progressing     Problem: MAGEN  Goal: Will exhibit normal sleep and speech and no impulsivity  Description: INTERVENTIONS:  - Administer medication as ordered  - Set limits on impulsive behavior  - Make attempts to decrease external stimuli as possible  Outcome: Progressing     Problem: PSYCHOSIS  Goal: Will report no hallucinations or delusions  Description: Interventions:  - Administer medication as  ordered  - Every waking shifts and PRN assess for the presence of hallucinations and or delusions  - Assist with reality testing to support increasing orientation  - Assess if patient's hallucinations or delusions are encouraging self-harm or harm to others and intervene as appropriate  Outcome: Progressing     Problem: SLEEP DISTURBANCE  Goal: Will exhibit normal sleeping pattern  Description: Interventions:  -  Assess the patients sleep pattern, noting recent changes  - Administer medication as ordered  - Decrease environmental stimuli, including noise, as appropriate during the night  - Encourage the patient to actively participate in unit groups and or exercise during the day to enhance ability to achieve adequate sleep at night  - Assess the patient, in the morning, encouraging a description of sleep experience  Outcome: Progressing     Problem: INVOLUNTARY ADMIT  Goal: Will cooperate with staff recommendations and doctor's orders and will demonstrate appropriate behavior  Description: INTERVENTIONS:  - Treat underlying conditions and offer medication as ordered  - Educate regarding involuntary admission procedures and rules  - Utilize positive consistent limit setting strategies to support patient and staff safety  Outcome: Progressing     Problem: SAFETY, RESTRAINT - VIOLENT/SELF-DESTRUCTIVE  Goal: Remains free of harm/injury from restraints (Restraint for Violent/Self-Destructive Behavior)  Description: INTERVENTIONS:  - Instruct patient/family regarding restraint use   - Assess and monitor physiologic and psychological status   - Provide interventions and comfort measures to meet assessed patient needs   - Ensure continuous in person monitoring is provided   - Identify and implement measures to help patient regain control  - Assess readiness for release of restraint  Outcome: Progressing  Goal: Returns to optimal restraint-free functioning  Description: INTERVENTIONS:  - Assess the patient's behavior and symptoms that indicate continued need for restraint  - Identify and implement measures to help patient regain control  - Assess readiness for release of restraint   Outcome: Progressing     Problem: Nutrition/Hydration-ADULT  Goal: Nutrient/Hydration intake appropriate for improving, restoring or maintaining nutritional needs  Description: Monitor and assess patient's nutrition/hydration status for malnutrition. Collaborate with interdisciplinary team and initiate plan and interventions as ordered. Monitor patient's weight and dietary intake as ordered or per policy. Utilize nutrition screening tool and intervene as necessary. Determine patient's food preferences and provide high-protein, high-caloric foods as appropriate.      INTERVENTIONS:  - Monitor oral intake, urinary output, labs, and treatment plans  - Assess nutrition and hydration status and recommend course of action  - Evaluate amount of meals eaten  - Assist patient with eating if necessary   - Allow adequate time for meals  - Recommend/ encourage appropriate diets, oral nutritional supplements, and vitamin/mineral supplements  - Order, calculate, and assess calorie counts as needed  - Recommend, monitor, and adjust tube feedings and TPN/PPN based on assessed needs  - Assess need for intravenous fluids  - Provide specific nutrition/hydration education as appropriate  - Include patient/family/caregiver in decisions related to nutrition  Outcome: Progressing     Problem: Ineffective Coping  Goal: Participates in unit activities  Description: Interventions:  - Provide therapeutic environment   - Provide required programming   - Redirect inappropriate behaviors   Outcome: Progressing

## 2023-08-16 NOTE — NURSING NOTE
Pt adherent with scheduled qhs zyprexa 15mg PO with encouragement, refused melatonin, states, "I only want two pills."    Pt has remained behaviorally appropriate. Continual observation discontinued @ 21:42, order discontinued by [facility] provider Dr. Aj Mchugh.

## 2023-08-17 PROCEDURE — 99232 SBSQ HOSP IP/OBS MODERATE 35: CPT | Performed by: PSYCHIATRY & NEUROLOGY

## 2023-08-17 RX ADMIN — Medication 6 MG: at 21:03

## 2023-08-17 RX ADMIN — OLANZAPINE 20 MG: 10 TABLET, ORALLY DISINTEGRATING ORAL at 21:03

## 2023-08-17 RX ADMIN — TRAZODONE HYDROCHLORIDE 50 MG: 50 TABLET ORAL at 21:03

## 2023-08-17 NOTE — NURSING NOTE
Patient has been about the unit. Requires redirection at times for instigating peers. Remains disorganized. Denies symptoms. Refused morning dose of Senokot.

## 2023-08-17 NOTE — NURSING NOTE
scheduled medication administered, however pt refused partial dose of 6 mg melatonin. Melatonin 3 mg PO administered.

## 2023-08-17 NOTE — TREATMENT TEAM
08/17/23 1300   Activity/Group Checklist   Group   (recovery group)   Attendance Attended   Attendance Duration (min) 46-60   Interactions Interacted appropriately   Affect/Mood Appropriate   Goals Achieved Discussed coping strategies; Discussed self-esteem issues; Able to listen to others; Able to engage in interactions; Able to reflect/comment on own behavior;Displayed empathy;Able to self-disclose; Able to recieve feedback; Able to give feedback to another

## 2023-08-17 NOTE — PROGRESS NOTES
08/17/23 1000   Activity/Group Checklist   Group Other (Comment)  (Group Art Therapy/Psychodynamic, Creatively Exploring the 2D to 3D Parallel to Recovery)   Attendance Attended   Attendance Duration (min) Greater than 60   Interactions Interacted appropriately   Affect/Mood Appropriate   Goals Achieved Discussed coping strategies; Able to listen to others; Able to engage in interactions; Able to recieve feedback; Able to give feedback to another  (Able to engage materials, but did not do directive; full participation)

## 2023-08-17 NOTE — PROGRESS NOTES
08/17/23 0837   Team Meeting   Meeting Type Daily Rounds   Team Members Present   Team Members Present Physician;Nurse;   Physician Team Member [facility] [place]   Patient/Family Present   Patient Present No   Patient's Family Present No   Pt med/meal compliant. Visible on the unit. Irritable edge. Requesting PRN Trazodone for sleep. Pt asleep all night. Discharge to be determined.

## 2023-08-17 NOTE — NURSING NOTE
2102- per patient request PRN Trazodone 50 mg PO administered. 2202- Pt observed sleeping after receiving PRN medication.

## 2023-08-17 NOTE — PROGRESS NOTES
Progress Note - Behavioral Health   Frank Arias 28 y.o. female MRN: [id number]  Unit/Bed#: [facility] 240-83 Encounter: [id number]    Assessment/Plan   Principal Problem:    Psychosis ([address]) rule out schizophrenia versus schizoaffective disorder  Active Problems:    Medical clearance for psychiatric admission    Mild tetrahydrocannabinol (THC) abuse      Recommended Treatment:   Continue Zyprexa Zydis 20 mg nightly for psychosis, mouth checks    Continue with group therapy, milieu therapy and occupational therapy. Continue frequent safety checks and vitals per unit protocol. Case discussed with treatment team.  Risks, benefits and possible side effects of Medications: Risks, benefits, and possible side effects of medications have been explained to the patient, who verbalizes understanding    Current Legal Status: 303  Disposition: TBD, coordinating with case management  ------------------------------------------------------------    Subjective: Per nursing report, Selina Torres has been compliant with scheduled Zyprexa Zydis. She is noted to have an irritable edge. Patient refused senna and was encouraged to hydrate adequately. She refused full dose of melatonin 6 mg and instead took 3 mg po and later requested trazodone 50 mg po. She was noted to be sleeping afterwards. Today, Selina Torres was seen and evaluated alongside attending physician. On evaluation, patient is superficially pleasant, less suspicious. She is less disorganized and less circumstantial in thought process but remains with paranoid ideation regarding treatment, medications and peers. She reports feeling "a lot better" this morning and that she is "improving myself". She reports being able to better "control my mind and thoughts" and feeling "more balanced, calmer". Patient states she slept 8-9 hours of sleep overnight describes her energy levels as "swinging" this morning. She reports she is trying to remain in behavioral control but it is "not easy". Patient shares she feels peers are "judging, criticizing" her. Patient continues to have paranoid ideation regarding her medications and feels she does not need them and they are resulting in her having more "critical thinking". Patient is tolerating current medication regimen well and denies any medication side effects. She denies SI/HI/AVH. Patient signed CELESTINA for Sister Aubree Vaughn with this [facility]. Progress Toward Goals: slow improvement    Psychiatric Review of Systems:  Behavior over the last 24 hours: improving slowly  Sleep: improving  Appetite: adequate  Medication side effects: none verbalized  ROS: Complete review of systems is negative except as noted above.     Vital signs in last 24 hours:   Temp:  [97.4 °F (36.3 °C)-98 °F (36.7 °C)] 98 °F (36.7 °C)  HR:  [85-88] 85  Resp:  [16] 16  BP: (112-119)/(59-67) 119/59    Mental Status Exam:  Appearance:  alert, good eye contact, appears stated age, marginal grooming/hygiene and dressed in hospital attire   Behavior:  calm, cooperative and less suspicious but superficially pleasant   Motor: no abnormal movements and normal gait and balance   Speech:  spontaneous, clear, normal rate, normal volume and coherent   Mood:  "A lot better"   Affect:  Less blunted, more reactive; slight irritable edge   Thought Process:   Less disorganized, less circumstantial   Thought Content: paranoid ideation   Perceptual disturbances: no reported hallucinations and does not appear to be responding to internal stimuli at this time; internally preoccupied at times   Risk Potential: No active or passive suicidal or homicidal ideation was verbalized during interview   Cognition: oriented to self and situation, appears to be of average intelligence and cognition not formally tested   Insight:  Limited   Judgment: Limited     Current Medications:  Current Facility-Administered Medications   Medication Dose Route Frequency Provider Last Rate   • acetaminophen  650 mg Oral Q6H PRN Jean Carlos Lange MD     • acetaminophen  650 mg Oral Q4H PRN Jean Carlos Lange MD     • acetaminophen  975 mg Oral Q6H PRN Jean Carlos Lange MD     • aluminum-magnesium hydroxide-simethicone  30 mL Oral Q4H PRN Jean Carlos Lange MD     • haloperidol lactate  2.5 mg Intramuscular Q4H PRN Max 4/day Jean Carlos Lange MD      And   • LORazepam  1 mg Intramuscular Q4H PRN Max 4/day Jean Carlos Lange MD      And   • benztropine  0.5 mg Intramuscular Q4H PRN Max 4/day Jean Carlos Lange MD     • haloperidol lactate  5 mg Intramuscular Q4H PRN Max 4/day Jean Carlos Laneg MD      And   • LORazepam  2 mg Intramuscular Q4H PRN Max 4/day Jean Carlos Lange MD      And   • benztropine  1 mg Intramuscular Q4H PRN Max 4/day Jean Carlos Lange MD     • benztropine  1 mg Oral Q4H PRN Max 6/day Jean Carlos Lange MD     • bisacodyl  10 mg Rectal Daily PRN Jean Carlos Lange MD     • hydrOXYzine HCL  50 mg Oral Q6H PRN Max 4/day Jean Carlos Lange MD      Or   • diphenhydrAMINE  50 mg Intramuscular Q6H PRN Jean Carlos Lange MD     • ergocalciferol  50,000 Units Oral Weekly MARY Arzola     • haloperidol  1 mg Oral Q6H PRN Jean Carlos Lange MD     • haloperidol  2.5 mg Oral Q4H PRN Max 4/day Jean Carlos Lange MD     • haloperidol  5 mg Oral Q4H PRN Max 4/day Jean Carlos Lange MD     • hydrOXYzine HCL  100 mg Oral Q6H PRN Max 4/day Jean Carlos Lange MD      Or   • LORazepam  2 mg Intramuscular Q6H PRN Jean Carlos Lange MD     • hydrOXYzine HCL  25 mg Oral Q6H PRN Max 4/day Jean Carlos Lange MD     • melatonin  6 mg Oral HS Clemencia Art DO     • OLANZapine  20 mg Oral HS Clemencia Art DO     • polyethylene glycol  17 g Oral Daily PRN Jean Carlos Lange MD     • senna-docusate sodium  1 tablet Oral Daily PRN Jean Carlos Lange MD     • senna-docusate sodium  1 tablet Oral BID Yesenia Singh DO     • traZODone  50 mg Oral HS PRN Jean Carlos Lange MD         Behavioral Health Medications: all current active meds have been reviewed. Changes as in plan section above. Laboratory results:  I have personally reviewed all pertinent laboratory/tests results. No results found for this or any previous visit (from the past 48 hour(s)).      Muriel Lyles,

## 2023-08-17 NOTE — TREATMENT TEAM
08/17/23 0968   Activity/Group Checklist   Group Community meeting   Attendance Attended   Attendance Duration (min) 16-30   Interactions Interacted appropriately   Affect/Mood Appropriate   Goals Achieved Able to listen to others; Able to engage in interactions; Able to self-disclose; Able to recieve feedback

## 2023-08-17 NOTE — PLAN OF CARE
Problem: DISCHARGE PLANNING  Goal: Discharge to home or other facility with appropriate resources  Description: INTERVENTIONS:  - Identify barriers to discharge w/patient and caregiver  - Arrange for needed discharge resources and transportation as appropriate  - Identify discharge learning needs (meds, wound care, etc.)  - Arrange for interpretive services to assist at discharge as needed  - Refer to Case Management Department for coordinating discharge planning if the patient needs post-hospital services based on physician/advanced practitioner order or complex needs related to functional status, cognitive ability, or social support system  Outcome: Progressing     Problem: Alteration in Thoughts and Perception  Goal: Verbalize thoughts and feelings  Description: Interventions:  - Promote a nonjudgmental and trusting relationship with the patient through active listening and therapeutic communication  - Assess patient's level of functioning, behavior and potential for risk  - Engage patient in 1 on 1 interactions  - Encourage patient to express fears, feelings, frustrations, and discuss symptoms    - [place] patient to reality, help patient recognize reality-based thinking   - Administer medications as ordered and assess for potential side effects  - Provide the patient education related to the signs and symptoms of the illness and desired effects of prescribed medications  Outcome: Progressing  Goal: Refrain from acting on delusional thinking/internal stimuli  Description: Interventions:  - Monitor patient closely, per order   - Utilize least restrictive measures   - Set reasonable limits, give positive feedback for acceptable   - Administer medications as ordered and monitor of potential side effects  Outcome: Progressing  Goal: Agree to be compliant with medication regime, as prescribed and report medication side effects  Description: Interventions:  - Offer appropriate PRN medication and supervise ingestion; conduct AIMS, as needed   Outcome: Progressing  Goal: Attend and participate in unit activities, including therapeutic, recreational, and educational groups  Description: Interventions:  -Encourage Visitation and family involvement in care  Outcome: Progressing     Problem: MAGEN  Goal: Will exhibit normal sleep and speech and no impulsivity  Description: INTERVENTIONS:  - Administer medication as ordered  - Set limits on impulsive behavior  - Make attempts to decrease external stimuli as possible  Outcome: Progressing     Problem: PSYCHOSIS  Goal: Will report no hallucinations or delusions  Description: Interventions:  - Administer medication as  ordered  - Every waking shifts and PRN assess for the presence of hallucinations and or delusions  - Assist with reality testing to support increasing orientation  - Assess if patient's hallucinations or delusions are encouraging self-harm or harm to others and intervene as appropriate  Outcome: Progressing     Problem: SLEEP DISTURBANCE  Goal: Will exhibit normal sleeping pattern  Description: Interventions:  -  Assess the patients sleep pattern, noting recent changes  - Administer medication as ordered  - Decrease environmental stimuli, including noise, as appropriate during the night  - Encourage the patient to actively participate in unit groups and or exercise during the day to enhance ability to achieve adequate sleep at night  - Assess the patient, in the morning, encouraging a description of sleep experience  Outcome: Progressing     Problem: INVOLUNTARY ADMIT  Goal: Will cooperate with staff recommendations and doctor's orders and will demonstrate appropriate behavior  Description: INTERVENTIONS:  - Treat underlying conditions and offer medication as ordered  - Educate regarding involuntary admission procedures and rules  - Utilize positive consistent limit setting strategies to support patient and staff safety  Outcome: Progressing     Problem: SAFETY, RESTRAINT - VIOLENT/SELF-DESTRUCTIVE  Goal: Remains free of harm/injury from restraints (Restraint for Violent/Self-Destructive Behavior)  Description: INTERVENTIONS:  - Instruct patient/family regarding restraint use   - Assess and monitor physiologic and psychological status   - Provide interventions and comfort measures to meet assessed patient needs   - Ensure continuous in person monitoring is provided   - Identify and implement measures to help patient regain control  - Assess readiness for release of restraint  Outcome: Progressing  Goal: Returns to optimal restraint-free functioning  Description: INTERVENTIONS:  - Assess the patient's behavior and symptoms that indicate continued need for restraint  - Identify and implement measures to help patient regain control  - Assess readiness for release of restraint   Outcome: Progressing     Problem: Nutrition/Hydration-ADULT  Goal: Nutrient/Hydration intake appropriate for improving, restoring or maintaining nutritional needs  Description: Monitor and assess patient's nutrition/hydration status for malnutrition. Collaborate with interdisciplinary team and initiate plan and interventions as ordered. Monitor patient's weight and dietary intake as ordered or per policy. Utilize nutrition screening tool and intervene as necessary. Determine patient's food preferences and provide high-protein, high-caloric foods as appropriate.      INTERVENTIONS:  - Monitor oral intake, urinary output, labs, and treatment plans  - Assess nutrition and hydration status and recommend course of action  - Evaluate amount of meals eaten  - Assist patient with eating if necessary   - Allow adequate time for meals  - Recommend/ encourage appropriate diets, oral nutritional supplements, and vitamin/mineral supplements  - Order, calculate, and assess calorie counts as needed  - Recommend, monitor, and adjust tube feedings and TPN/PPN based on assessed needs  - Assess need for intravenous fluids  - Provide specific nutrition/hydration education as appropriate  - Include patient/family/caregiver in decisions related to nutrition  Outcome: Progressing     Problem: Ineffective Coping  Goal: Participates in unit activities  Description: Interventions:  - Provide therapeutic environment   - Provide required programming   - Redirect inappropriate behaviors   Outcome: Progressing

## 2023-08-17 NOTE — SOCIAL WORK
SW spoke with pt regarding incident with peer on the unit. Pt stated she feels as though peer is staring at her and "has a problem with me". SW encouraged pt to seek staff support to address conflict and to avoid antagonizing comments and gestures. Pt agreeable.

## 2023-08-18 PROCEDURE — 99232 SBSQ HOSP IP/OBS MODERATE 35: CPT | Performed by: PSYCHIATRY & NEUROLOGY

## 2023-08-18 RX ORDER — TRAZODONE HYDROCHLORIDE 100 MG/1
100 TABLET ORAL
Status: DISCONTINUED | OUTPATIENT
Start: 2023-08-18 | End: 2023-08-29 | Stop reason: HOSPADM

## 2023-08-18 RX ADMIN — OLANZAPINE 20 MG: 10 TABLET, ORALLY DISINTEGRATING ORAL at 21:13

## 2023-08-18 RX ADMIN — Medication 6 MG: at 21:13

## 2023-08-18 NOTE — NURSING NOTE
Patient paces the unit with an angry affect and appears agitated. Patient has poor eye contact and is suspicious. Patient was compliant with scheduled medications. Patient denied SI/AVH. Patient c/o difficulty sleeping and PRN trazodone 50mg was administered at 2103. Will monitor for effectiveness.

## 2023-08-18 NOTE — PROGRESS NOTES
Progress Note - Behavioral Health   Emeka Old 28 y.o. female MRN: [id number]  Unit/Bed#: [address] 527-71 Encounter: [id number]    Assessment/Plan   Principal Problem:    Psychosis ([address]) rule out schizophrenia versus schizoaffective disorder  Active Problems:    Medical clearance for psychiatric admission    Mild tetrahydrocannabinol (THC) abuse      Recommended Treatment:   Continue Zyprexa Zydis 20 mg nightly for psychosis  Increase as needed trazodone to 100 mg for insomnia    Continue with group therapy, milieu therapy and occupational therapy. Continue frequent safety checks and vitals per unit protocol. Case discussed with treatment team.  Risks, benefits and possible side effects of Medications: Risks, benefits, and possible side effects of medications have been explained to the patient, who verbalizes understanding    Current Legal Status: 303  Disposition: Coordinating with case management  ------------------------------------------------------------    Subjective: Per nursing report, Harley Golden reported feeling as if her peers are staring at her and "has a problem with me". She was noted to be pacing the unit yesterday with an angry affect, appearing agitated. She appears suspicious but was compliant with her scheduled medications. Patient had difficulty sleeping and received as needed trazodone 50 mg at 2103. Today, Harley Golden was seen and evaluated for continuity of care. On evaluation, patient is less circumstantial and less disorganized but has an irritable edge. She eli paranoid regarding her medications and is superficially cooperative. She remains suspicious of treatment and staff. She reports "not doing alright" this morning due to not getting adequate sleep. She reports 5 hours of sleep overnight. She reports an adequate appetite for breakfast this morning though is unpleased with the food. She describes her energy levels as "good", "100%".   She plans to attend groups later, "rest, eat more, exercise". She remains focused on discharge and "I have been spending my energy here" but "I don't like people controlling me". Patient at one point walks away from interviewer but does return and finish interview. Patient is tolerating current medication regimen. She denies SI/HI/AVH at this time. Progress Toward Goals: slow improvement    Psychiatric Review of Systems:  Behavior over the last 24 hours: Unchanged  Sleep: Decreased  Appetite: adequate  Medication side effects: none verbalized  ROS: Complete review of systems is negative except as noted above.     Vital signs in last 24 hours:   Temp:  [96.5 °F (35.8 °C)-97.8 °F (36.6 °C)] 96.5 °F (35.8 °C)  HR:  [82-90] 90  Resp:  [16] 16  BP: (107-115)/(57-62) 115/57    Mental Status Exam:  Appearance:  alert, Intermittent eye contact, appears stated age, marginal grooming/hygiene, Unkempt hair and Dressed in hospital attire, no acute distress   Behavior:  calm, superficially cooperative and Suspicious of staff at times   Motor: no abnormal movements and normal gait and balance   Speech:  spontaneous, clear, increased rate at time, normal volume and coherent   Mood:  "Not doing alright"   Affect:  irritable edge   Thought Process:  Less disorganized, less circumstantial   Thought Content: paranoid ideation, focused on discharge   Perceptual disturbances: no reported hallucinations and does not appear to be responding to internal stimuli at this time; appears internally preoccupied at time   Risk Potential: No active or passive suicidal or homicidal ideation was verbalized during interview   Cognition: oriented to self and situation, appears to be of average intelligence and cognition not formally tested   Insight:  Limited- poor   Judgment: Limited     Current Medications:  Current Facility-Administered Medications   Medication Dose Route Frequency Provider Last Rate   • acetaminophen  650 mg Oral Q6H PRN Venita Nissen, MD     • acetaminophen  650 mg Oral Q4H PRN Mariah Vick MD     • acetaminophen  975 mg Oral Q6H PRN Mariah Vick MD     • aluminum-magnesium hydroxide-simethicone  30 mL Oral Q4H PRN Mariah Vick MD     • haloperidol lactate  2.5 mg Intramuscular Q4H PRN Max 4/day Mariah Vick MD      And   • LORazepam  1 mg Intramuscular Q4H PRN Max 4/day Mariah Vick MD      And   • benztropine  0.5 mg Intramuscular Q4H PRN Max 4/day Mariah Vick MD     • haloperidol lactate  5 mg Intramuscular Q4H PRN Max 4/day Mariah Vick MD      And   • LORazepam  2 mg Intramuscular Q4H PRN Max 4/day Mariah Vick MD      And   • benztropine  1 mg Intramuscular Q4H PRN Max 4/day Mariah Vick MD     • benztropine  1 mg Oral Q4H PRN Max 6/day Mariah Vick MD     • bisacodyl  10 mg Rectal Daily PRN Mariah Vick MD     • hydrOXYzine HCL  50 mg Oral Q6H PRN Max 4/day Mariah Vick MD      Or   • diphenhydrAMINE  50 mg Intramuscular Q6H PRN Mariah Vick MD     • ergocalciferol  50,000 Units Oral Weekly MARY Arzola     • haloperidol  1 mg Oral Q6H PRN Mariah Vick MD     • haloperidol  2.5 mg Oral Q4H PRN Max 4/day Mariah Vick MD     • haloperidol  5 mg Oral Q4H PRN Max 4/day Mariah Vick MD     • hydrOXYzine HCL  100 mg Oral Q6H PRN Max 4/day Mariah Vick MD      Or   • LORazepam  2 mg Intramuscular Q6H PRN Mariah Vick MD     • hydrOXYzine HCL  25 mg Oral Q6H PRN Max 4/day Mariah Vick MD     • melatonin  6 mg Oral HS Clemencia Art DO     • OLANZapine  20 mg Oral HS Clemencia Art DO     • polyethylene glycol  17 g Oral Daily PRN Mariah Vick MD     • senna-docusate sodium  1 tablet Oral Daily PRN Mariah Vick MD     • senna-docusate sodium  1 tablet Oral BID Joey Chu DO     • traZODone  100 mg Oral HS PRN Georgia C Holter,          Behavioral Health Medications: all current active meds have been reviewed. Changes as in plan section above.     Laboratory results:  I have personally reviewed all pertinent laboratory/tests results. No results found for this or any previous visit (from the past 48 hour(s)).      Ankush Sick, DO

## 2023-08-18 NOTE — NURSING NOTE
Patient about the unit. Less disorganized and more appropriate. No agitation or inappropriate behaviors. Denies symptoms. Refused routine Senokot.

## 2023-08-18 NOTE — PROGRESS NOTES
08/18/23 1000   Activity/Group Checklist   Group Other (Comment)  (Group Art Therapy/Psychodynamic, Creatively Exploring through Quotes)   Attendance Attended   Attendance Duration (min) Greater than 60   Interactions Interacted appropriately   Affect/Mood Appropriate   Goals Achieved Discussed coping strategies; Able to listen to others; Able to engage in interactions; Able to manage/cope with feelings; Able to recieve feedback; Able to give feedback to another  (Able to engage materials and directive; full participation)

## 2023-08-18 NOTE — PLAN OF CARE
Problem: DISCHARGE PLANNING  Goal: Discharge to home or other facility with appropriate resources  Description: INTERVENTIONS:  - Identify barriers to discharge w/patient and caregiver  - Arrange for needed discharge resources and transportation as appropriate  - Identify discharge learning needs (meds, wound care, etc.)  - Arrange for interpretive services to assist at discharge as needed  - Refer to Case Management Department for coordinating discharge planning if the patient needs post-hospital services based on physician/advanced practitioner order or complex needs related to functional status, cognitive ability, or social support system  Outcome: Not Progressing     Problem: Alteration in Thoughts and Perception  Goal: Verbalize thoughts and feelings  Description: Interventions:  - Promote a nonjudgmental and trusting relationship with the patient through active listening and therapeutic communication  - Assess patient's level of functioning, behavior and potential for risk  - Engage patient in 1 on 1 interactions  - Encourage patient to express fears, feelings, frustrations, and discuss symptoms    - [place] patient to reality, help patient recognize reality-based thinking   - Administer medications as ordered and assess for potential side effects  - Provide the patient education related to the signs and symptoms of the illness and desired effects of prescribed medications  Outcome: Not Progressing  Goal: Refrain from acting on delusional thinking/internal stimuli  Description: Interventions:  - Monitor patient closely, per order   - Utilize least restrictive measures   - Set reasonable limits, give positive feedback for acceptable   - Administer medications as ordered and monitor of potential side effects  Outcome: Not Progressing  Goal: Agree to be compliant with medication regime, as prescribed and report medication side effects  Description: Interventions:  - Offer appropriate PRN medication and supervise ingestion; conduct AIMS, as needed   Outcome: Not Progressing  Goal: Attend and participate in unit activities, including therapeutic, recreational, and educational groups  Description: Interventions:  -Encourage Visitation and family involvement in care  Outcome: Not Progressing     Problem: MAGEN  Goal: Will exhibit normal sleep and speech and no impulsivity  Description: INTERVENTIONS:  - Administer medication as ordered  - Set limits on impulsive behavior  - Make attempts to decrease external stimuli as possible  Outcome: Not Progressing     Problem: PSYCHOSIS  Goal: Will report no hallucinations or delusions  Description: Interventions:  - Administer medication as  ordered  - Every waking shifts and PRN assess for the presence of hallucinations and or delusions  - Assist with reality testing to support increasing orientation  - Assess if patient's hallucinations or delusions are encouraging self-harm or harm to others and intervene as appropriate  Outcome: Not Progressing     Problem: SLEEP DISTURBANCE  Goal: Will exhibit normal sleeping pattern  Description: Interventions:  -  Assess the patients sleep pattern, noting recent changes  - Administer medication as ordered  - Decrease environmental stimuli, including noise, as appropriate during the night  - Encourage the patient to actively participate in unit groups and or exercise during the day to enhance ability to achieve adequate sleep at night  - Assess the patient, in the morning, encouraging a description of sleep experience  Outcome: Not Progressing     Problem: INVOLUNTARY ADMIT  Goal: Will cooperate with staff recommendations and doctor's orders and will demonstrate appropriate behavior  Description: INTERVENTIONS:  - Treat underlying conditions and offer medication as ordered  - Educate regarding involuntary admission procedures and rules  - Utilize positive consistent limit setting strategies to support patient and staff safety  Outcome: Not Progressing     Problem: SAFETY, RESTRAINT - VIOLENT/SELF-DESTRUCTIVE  Goal: Remains free of harm/injury from restraints (Restraint for Violent/Self-Destructive Behavior)  Description: INTERVENTIONS:  - Instruct patient/family regarding restraint use   - Assess and monitor physiologic and psychological status   - Provide interventions and comfort measures to meet assessed patient needs   - Ensure continuous in person monitoring is provided   - Identify and implement measures to help patient regain control  - Assess readiness for release of restraint  Outcome: Not Progressing  Goal: Returns to optimal restraint-free functioning  Description: INTERVENTIONS:  - Assess the patient's behavior and symptoms that indicate continued need for restraint  - Identify and implement measures to help patient regain control  - Assess readiness for release of restraint   Outcome: Not Progressing     Problem: Nutrition/Hydration-ADULT  Goal: Nutrient/Hydration intake appropriate for improving, restoring or maintaining nutritional needs  Description: Monitor and assess patient's nutrition/hydration status for malnutrition. Collaborate with interdisciplinary team and initiate plan and interventions as ordered. Monitor patient's weight and dietary intake as ordered or per policy. Utilize nutrition screening tool and intervene as necessary. Determine patient's food preferences and provide high-protein, high-caloric foods as appropriate.      INTERVENTIONS:  - Monitor oral intake, urinary output, labs, and treatment plans  - Assess nutrition and hydration status and recommend course of action  - Evaluate amount of meals eaten  - Assist patient with eating if necessary   - Allow adequate time for meals  - Recommend/ encourage appropriate diets, oral nutritional supplements, and vitamin/mineral supplements  - Order, calculate, and assess calorie counts as needed  - Recommend, monitor, and adjust tube feedings and TPN/PPN based on assessed needs  - Assess need for intravenous fluids  - Provide specific nutrition/hydration education as appropriate  - Include patient/family/caregiver in decisions related to nutrition  Outcome: Not Progressing     Problem: Ineffective Coping  Goal: Participates in unit activities  Description: Interventions:  - Provide therapeutic environment   - Provide required programming   - Redirect inappropriate behaviors   Outcome: Not Progressing

## 2023-08-18 NOTE — SOCIAL WORK
Pt requested to meet with SW when she saw SW in passing. Pt stated she would like to speak with attending regarding discharge. SW reminded pt that MD would like her to stay through the weekend for further observation and to monitor medications. SW encouraged pt to attend groups to fill her time and learn new coping strategies. Pt stated she will try to go to group.

## 2023-08-18 NOTE — PROGRESS NOTES
08/18/23 0845   Team Meeting   Meeting Type Daily Rounds   Team Members Present   Team Members Present Physician;Nurse;   Physician Team Member Holter   [facility] Gladis   Patient/Family Present   Patient Present No   Patient's Family Present No     Med/meal compliant. PRN Trazodone for sleep which was effective. Pacing in the hallway. Agitated at times but redirectable. Discharge to be determined.

## 2023-08-18 NOTE — NURSING NOTE
Pt refused scheduled Senna. Reportedly had been following around a male peer, causing verbal altercation. Pts redirected, reminded of boundaries and appropriate behaviors on unit. Pt retreated to room and is listening to music.

## 2023-08-19 PROCEDURE — 99232 SBSQ HOSP IP/OBS MODERATE 35: CPT | Performed by: PSYCHIATRY & NEUROLOGY

## 2023-08-19 RX ADMIN — SENNOSIDES AND DOCUSATE SODIUM 1 TABLET: 50; 8.6 TABLET ORAL at 08:47

## 2023-08-19 RX ADMIN — SENNOSIDES AND DOCUSATE SODIUM 1 TABLET: 50; 8.6 TABLET ORAL at 14:23

## 2023-08-19 RX ADMIN — Medication 6 MG: at 21:42

## 2023-08-19 RX ADMIN — OLANZAPINE 20 MG: 10 TABLET, ORALLY DISINTEGRATING ORAL at 21:42

## 2023-08-19 NOTE — NURSING NOTE
Patient has been walking in the hallway and listening to music. She is pleasant in interaction although keeps the interaction limited. She was cooperative with HS medication despite her unhappiness about having to be heare and having to be given medication. She denies symptoms or need to be here.

## 2023-08-19 NOTE — NURSING NOTE
Patient with continued c/o constipation.  Patient declined miralax and was given prune juice per patient request.

## 2023-08-19 NOTE — PLAN OF CARE
Problem: DISCHARGE PLANNING  Goal: Discharge to home or other facility with appropriate resources  Description: INTERVENTIONS:  - Identify barriers to discharge w/patient and caregiver  - Arrange for needed discharge resources and transportation as appropriate  - Identify discharge learning needs (meds, wound care, etc.)  - Arrange for interpretive services to assist at discharge as needed  - Refer to Case Management Department for coordinating discharge planning if the patient needs post-hospital services based on physician/advanced practitioner order or complex needs related to functional status, cognitive ability, or social support system  Outcome: Progressing     Problem: Alteration in Thoughts and Perception  Goal: Verbalize thoughts and feelings  Description: Interventions:  - Promote a nonjudgmental and trusting relationship with the patient through active listening and therapeutic communication  - Assess patient's level of functioning, behavior and potential for risk  - Engage patient in 1 on 1 interactions  - Encourage patient to express fears, feelings, frustrations, and discuss symptoms    - [place] patient to reality, help patient recognize reality-based thinking   - Administer medications as ordered and assess for potential side effects  - Provide the patient education related to the signs and symptoms of the illness and desired effects of prescribed medications  Outcome: Progressing  Goal: Refrain from acting on delusional thinking/internal stimuli  Description: Interventions:  - Monitor patient closely, per order   - Utilize least restrictive measures   - Set reasonable limits, give positive feedback for acceptable   - Administer medications as ordered and monitor of potential side effects  Outcome: Progressing  Goal: Agree to be compliant with medication regime, as prescribed and report medication side effects  Description: Interventions:  - Offer appropriate PRN medication and supervise ingestion; conduct AIMS, as needed   Outcome: Progressing  Goal: Attend and participate in unit activities, including therapeutic, recreational, and educational groups  Description: Interventions:  -Encourage Visitation and family involvement in care  Outcome: Progressing     Problem: Ineffective Coping  Goal: Participates in unit activities  Description: Interventions:  - Provide therapeutic environment   - Provide required programming   - Redirect inappropriate behaviors   Outcome: Progressing     Problem: MAGEN  Goal: Will exhibit normal sleep and speech and no impulsivity  Description: INTERVENTIONS:  - Administer medication as ordered  - Set limits on impulsive behavior  - Make attempts to decrease external stimuli as possible  Outcome: Progressing     Problem: PSYCHOSIS  Goal: Will report no hallucinations or delusions  Description: Interventions:  - Administer medication as  ordered  - Every waking shifts and PRN assess for the presence of hallucinations and or delusions  - Assist with reality testing to support increasing orientation  - Assess if patient's hallucinations or delusions are encouraging self-harm or harm to others and intervene as appropriate  Outcome: Progressing     Problem: SLEEP DISTURBANCE  Goal: Will exhibit normal sleeping pattern  Description: Interventions:  -  Assess the patients sleep pattern, noting recent changes  - Administer medication as ordered  - Decrease environmental stimuli, including noise, as appropriate during the night  - Encourage the patient to actively participate in unit groups and or exercise during the day to enhance ability to achieve adequate sleep at night  - Assess the patient, in the morning, encouraging a description of sleep experience  Outcome: Progressing     Problem: INVOLUNTARY ADMIT  Goal: Will cooperate with staff recommendations and doctor's orders and will demonstrate appropriate behavior  Description: INTERVENTIONS:  - Treat underlying conditions and offer medication as ordered  - Educate regarding involuntary admission procedures and rules  - Utilize positive consistent limit setting strategies to support patient and staff safety  Outcome: Progressing     Problem: SAFETY, RESTRAINT - VIOLENT/SELF-DESTRUCTIVE  Goal: Remains free of harm/injury from restraints (Restraint for Violent/Self-Destructive Behavior)  Description: INTERVENTIONS:  - Instruct patient/family regarding restraint use   - Assess and monitor physiologic and psychological status   - Provide interventions and comfort measures to meet assessed patient needs   - Ensure continuous in person monitoring is provided   - Identify and implement measures to help patient regain control  - Assess readiness for release of restraint  Outcome: Progressing  Goal: Returns to optimal restraint-free functioning  Description: INTERVENTIONS:  - Assess the patient's behavior and symptoms that indicate continued need for restraint  - Identify and implement measures to help patient regain control  - Assess readiness for release of restraint   Outcome: Progressing     Problem: Nutrition/Hydration-ADULT  Goal: Nutrient/Hydration intake appropriate for improving, restoring or maintaining nutritional needs  Description: Monitor and assess patient's nutrition/hydration status for malnutrition. Collaborate with interdisciplinary team and initiate plan and interventions as ordered. Monitor patient's weight and dietary intake as ordered or per policy. Utilize nutrition screening tool and intervene as necessary. Determine patient's food preferences and provide high-protein, high-caloric foods as appropriate.      INTERVENTIONS:  - Monitor oral intake, urinary output, labs, and treatment plans  - Assess nutrition and hydration status and recommend course of action  - Evaluate amount of meals eaten  - Assist patient with eating if necessary   - Allow adequate time for meals  - Recommend/ encourage appropriate diets, oral nutritional supplements, and vitamin/mineral supplements  - Order, calculate, and assess calorie counts as needed  - Recommend, monitor, and adjust tube feedings and TPN/PPN based on assessed needs  - Assess need for intravenous fluids  - Provide specific nutrition/hydration education as appropriate  - Include patient/family/caregiver in decisions related to nutrition  Outcome: Progressing

## 2023-08-19 NOTE — PROGRESS NOTES
08/19/23 1300   Activity/Group Checklist   Group Other (Comment)  (OPEN STUDIO Art Therapy/Social Group)   Attendance Attended   Attendance Duration (min) Greater than 60   Interactions Interacted appropriately   Affect/Mood Appropriate   Goals Achieved Able to listen to others; Able to engage in interactions

## 2023-08-19 NOTE — NURSING NOTE
Patient pacing the unit quietly and isolated to self. Pleasant and cooperative with this writer. Denies Si, HI, A/V hallucinations on interview. Irritable and agitated at times with other peers but otherwise able to be redirected and and apologetic for behavior. No other behaviors noted and remained medication and meal compliant.

## 2023-08-19 NOTE — PROGRESS NOTES
Progress Note - Behavioral Health   Sangeeta Sanderson 28 y.o. female MRN: [id number]  Unit/Bed#: [facility] 115-45 Encounter: [id number]    Assessment/Plan   Principal Problem:    Psychosis ([address]) rule out schizophrenia versus schizoaffective disorder  Active Problems:    Medical clearance for psychiatric admission    Mild tetrahydrocannabinol (THC) abuse      Recommended Treatment:     1. No psychopharmacologic changes necessary at this time; will continue to assess for further optimization. 2. Continue with current medications:  a. Zyprexa dispersible tablet 20 mg at bedtime for psychosis and mood. i. Patient currently on mouth checks. b. Trazodone 100 mg at bedtime as needed for insomnia. c. Senokot 1 tablet twice daily for standing bowel regimen. 3. Continue with group therapy, milieu therapy and occupational therapy. 4. Continue frequent safety checks and vitals per unit protocol. 5. Continue with SLIM medical management as indicated  6. Continue coordinating with case management regarding disposition    Legal Status: 303  Disposition: To be determined, coordinating with case management    Case discussed with treatment team.  Risks, benefits and possible side effects of Medications: Risks, benefits, and possible side effects of medications have previously been explained. No new medications at this time. ------------------------------------------------------------    Subjective: Patient's chart was reviewed, and patient's progress and plan was discussed with treatment team. Per nursing report, Jazmín Wilde has been demanding, but redirectable, was reportedly following around a male peer which caused a verbal altercation but ultimately cooperative on the unit and compliant with medications with the exception of evening Senokot. Last night patient was documented to have slept throughout the night. Jazmín Wilde was evaluated this morning for continuity of care.  On examination, Jazmín Wilde is superficial, paranoid, grandiose, dressed casually and standing to speak in her doorway. When asked if this writer could take a moment to speak with her she states "no," but then continues to answer interviewer questions despite this. She states her mood is " good, perfect, strong." She reports sleeping "perfectly" endorsing 7 hours of uninterrupted sleep with no difficulty falling asleep or staying asleep and no reported nightmares, she states her sleep for the past 2 nights has been absolutely perfect due to "I really like this new room's energy," and her energy level today is "80 out of 100." Her appetite has been good and she endorses eating breakfast without difficulty. She denies any adverse effects from medications. Her goals for today are to remain in behavioral control and continue working toward discharge. Today, Dora Barroso is marilin for safety on the unit. She denies suicidal ideation, homicidal ideation, auditory hallucinations, and visual hallucinations. Of note, the patient states "I do not need to talk to you, or anyone else, I will let you know if anything is going on with me, there is nothing going on with me."  She continues to be angry regarding her admission and does not feel as though she needs mental health treatment despite confrontation with events eating to admission, she continues to have poor insight and judgment at this time and appears mood incongruent as she has irritable edge and expresses hostile feelings and steps closer to this writer during interview but appears smiling during the interview with intense eye contact. She offers no questions, comments, or concerns at this time. VS: Reviewed, within normal limits    Progress Toward Goals: Slow improvement    Psychiatric Review of Systems:  Behavior over the last 24 hours: unchanged  Sleep: improving  Appetite: adequate  Medication side effects: none verbalized  Medical ROS: Complete review of systems is negative except as noted above.     Vital signs in last 24 hours:  Temp:  [97.5 °F (36.4 °C)-97.6 °F (36.4 °C)] 97.6 °F (36.4 °C)  HR:  [76-83] 83  Resp:  [16] 16  BP: (105-119)/(65-66) 105/65    Mental Status Exam:    Appearance:  alert, Intense eye contact, appears older than stated age, casually dressed, appropriate grooming and hygiene, overweight, Long dark pulled back hair and smiling   Behavior:  calm, guarded and Superficially cooperative and standing to speak in the doorway to her room   Speech:  spontaneous, clear, normal rate, normal volume and coherent   Mood:  "Perfect, strong"   Affect:  increased in intensity, inappropriate smile   Thought Process:  illogical, goal directed, perseverative   Associations: perseverative   Thought Content:  paranoid ideation   Perceptual Disturbances: no reported hallucinations, does not appear to be responding to internal stimuli at this time and Appears internally preoccupied when observed later in the morning   Risk Potential: Suicidal ideation - None at present, contracts for safety on the unit  Homicidal ideation - None at present   Potential for aggression - Yes due to acute psychosis and angry and hostile feelings   Sensorium:  oriented to person, place and time/date   Memory:  recent memory impaired   Consciousness:  alert and awake   Attention/Concentration: attention span and concentration are age appropriate   Insight:  poor   Judgment: poor   Gait/Station: normal gait/station   Motor Activity: no abnormal movements     Current Medications:  Current Facility-Administered Medications   Medication Dose Route Frequency Provider Last Rate   • acetaminophen  650 mg Oral Q6H PRN Magi Quiros MD     • acetaminophen  650 mg Oral Q4H PRN Magi Quiros MD     • acetaminophen  975 mg Oral Q6H PRN Magi Quiros MD     • aluminum-magnesium hydroxide-simethicone  30 mL Oral Q4H PRN Magi Quiros MD     • haloperidol lactate  2.5 mg Intramuscular Q4H PRN Max 4/day Magi Quiros MD      And   • LORazepam  1 mg Intramuscular Q4H PRN Max 4/day Wendy Espinoza MD      And   • benztropine  0.5 mg Intramuscular Q4H PRN Max 4/day Wendy Espinoza MD     • haloperidol lactate  5 mg Intramuscular Q4H PRN Max 4/day Wendy Espinoza MD      And   • LORazepam  2 mg Intramuscular Q4H PRN Max 4/day Wendy Espinoza MD      And   • benztropine  1 mg Intramuscular Q4H PRN Max 4/day Wendy Espinoza MD     • benztropine  1 mg Oral Q4H PRN Max 6/day Wendy Espinoza MD     • bisacodyl  10 mg Rectal Daily PRN Wendy Espinoza MD     • hydrOXYzine HCL  50 mg Oral Q6H PRN Max 4/day Wendy Espinoza MD      Or   • diphenhydrAMINE  50 mg Intramuscular Q6H PRN Wendy Espinoza MD     • ergocalciferol  50,000 Units Oral Weekly MARY Arzola     • haloperidol  1 mg Oral Q6H PRN Wendy Espinoza MD     • haloperidol  2.5 mg Oral Q4H PRN Max 4/day Wendy Espinoza MD     • haloperidol  5 mg Oral Q4H PRN Max 4/day Wendy Espinoza MD     • hydrOXYzine HCL  100 mg Oral Q6H PRN Max 4/day Wendy Espinoza MD      Or   • LORazepam  2 mg Intramuscular Q6H PRN Wendy Espinoza MD     • hydrOXYzine HCL  25 mg Oral Q6H PRN Max 4/day Wendy Espinoza MD     • melatonin  6 mg Oral HS Clemencia Atr DO     • OLANZapine  20 mg Oral HS Clemencia Art DO     • polyethylene glycol  17 g Oral Daily PRN Wendy Espinoza MD     • senna-docusate sodium  1 tablet Oral Daily PRN Wendy Espinoza MD     • senna-docusate sodium  1 tablet Oral BID Junior Haylee DO     • traZODone  100 mg Oral HS PRN Georgia C Holter, DO         Behavioral Health Medications: all current active meds have been reviewed. Changes as in plan section above. Laboratory results:  I have personally reviewed all pertinent laboratory/tests results. No results found for this or any previous visit (from the past 48 hour(s)). This note has been constructed using a voice recognition system. There may be translation, syntax, or grammatical errors.  If you have any questions, please contact the dictating author.     Katie Johnson, DO  Psychiatry Residency, PGY-2

## 2023-08-20 PROCEDURE — 99232 SBSQ HOSP IP/OBS MODERATE 35: CPT | Performed by: PSYCHIATRY & NEUROLOGY

## 2023-08-20 RX ADMIN — OLANZAPINE 20 MG: 10 TABLET, ORALLY DISINTEGRATING ORAL at 21:26

## 2023-08-20 RX ADMIN — SENNOSIDES AND DOCUSATE SODIUM 1 TABLET: 50; 8.6 TABLET ORAL at 09:11

## 2023-08-20 RX ADMIN — Medication 6 MG: at 21:26

## 2023-08-20 RX ADMIN — POLYETHYLENE GLYCOL 3350 17 G: 17 POWDER, FOR SOLUTION ORAL at 18:08

## 2023-08-20 RX ADMIN — HYDROXYZINE HYDROCHLORIDE 100 MG: 50 TABLET, FILM COATED ORAL at 09:11

## 2023-08-20 NOTE — PLAN OF CARE
Problem: Alteration in Thoughts and Perception  Goal: Verbalize thoughts and feelings  Description: Interventions:  - Promote a nonjudgmental and trusting relationship with the patient through active listening and therapeutic communication  - Assess patient's level of functioning, behavior and potential for risk  - Engage patient in 1 on 1 interactions  - Encourage patient to express fears, feelings, frustrations, and discuss symptoms    - [place] patient to reality, help patient recognize reality-based thinking   - Administer medications as ordered and assess for potential side effects  - Provide the patient education related to the signs and symptoms of the illness and desired effects of prescribed medications  Outcome: Progressing  Goal: Agree to be compliant with medication regime, as prescribed and report medication side effects  Description: Interventions:  - Offer appropriate PRN medication and supervise ingestion; conduct AIMS, as needed   Outcome: Progressing     Problem: Nutrition/Hydration-ADULT  Goal: Nutrient/Hydration intake appropriate for improving, restoring or maintaining nutritional needs  Description: Monitor and assess patient's nutrition/hydration status for malnutrition. Collaborate with interdisciplinary team and initiate plan and interventions as ordered. Monitor patient's weight and dietary intake as ordered or per policy. Utilize nutrition screening tool and intervene as necessary. Determine patient's food preferences and provide high-protein, high-caloric foods as appropriate.      INTERVENTIONS:  - Monitor oral intake, urinary output, labs, and treatment plans  - Assess nutrition and hydration status and recommend course of action  - Evaluate amount of meals eaten  - Assist patient with eating if necessary   - Allow adequate time for meals  - Recommend/ encourage appropriate diets, oral nutritional supplements, and vitamin/mineral supplements  - Order, calculate, and assess calorie counts as needed  - Recommend, monitor, and adjust tube feedings and TPN/PPN based on assessed needs  - Assess need for intravenous fluids  - Provide specific nutrition/hydration education as appropriate  - Include patient/family/caregiver in decisions related to nutrition  Outcome: Progressing

## 2023-08-20 NOTE — NURSING NOTE
Pt observed to be increasingly anxious and paranoid. Pt demanding that staff remove her hospital band. She repeats " they can't know, they can't know" but does not elaborate any further. Education given on the importance of wearing her wristband. She continues to refuse education and wristband. Pt also requesting that staff tell patients to leave her alone because they are "messing with me." When asked if pt can elaborate she states "just keep them away from me." Pt given PO Atarax at 0911 with extensive encouragement. During reassessment at 1011, pt observed resting quietly in bed. No signs of anxiety observed. PRN medication effective.

## 2023-08-20 NOTE — NURSING NOTE
Pt refusing scheduled dose of Senna. Pt requesting PRN Miralax instead. Miralax administered at 1808.

## 2023-08-20 NOTE — NURSING NOTE
Pt remains disruptive to the unit. She continues to argue with staff and patients. She lacks insight on appropriate interaction. Pt requiring constant redirection throughout the day. She is preoccupied with discharge and states "I don't need medication, I don't know why I am still here." Education provided. Pt is med compliant with extensive encouragement. Appetite is fair. She denies any issues with sleep. Pt denies any SI/HI/AH/VH. Staff availability reinforced.

## 2023-08-20 NOTE — NURSING NOTE
Patient has been in the milieu and is social with select peers. Cooperative, pleasant with staff but remains guarded. She was cooperative with HS scheduled medications and denies S.I.H.I. A/H V/H

## 2023-08-20 NOTE — PROGRESS NOTES
Progress Note - Behavioral Health   Dre Payton 28 y.o. female MRN: [id number]  Unit/Bed#: [facility] 837-76 Encounter: [id number]    Assessment/Plan   Principal Problem:    Psychosis ([address]) rule out schizophrenia versus schizoaffective disorder  Active Problems:    Medical clearance for psychiatric admission    Mild tetrahydrocannabinol (THC) abuse      Recommended Treatment:     1. Will make the following medication changes:  2. Continue with current medications:  a. Zyprexa dispersible tablet 20 mg at bedtime for psychosis and mood. i. Patient currently on mouth checks. b. Trazodone 100 mg at bedtime as needed for insomnia. c. Senokot 1 tablet twice daily for standing bowel regimen. 3. Continue with group therapy, milieu therapy and occupational therapy. 4. Continue frequent safety checks and vitals per unit protocol. 5. Continue with [facility] medical management as indicated  6. Continue coordinating with case management regarding disposition    Legal Status: 303  Disposition: To be determined, coordinating with case management    Case discussed with treatment team.  Risks, benefits and possible side effects of Medications: Risks, benefits, and possible side effects of medications have previously been explained. No new medications at this time. ------------------------------------------------------------    Subjective: Patient's chart was reviewed, and patient's progress and plan was discussed with treatment team. Per nursing report, Merline Bellows has been pacing the unit, isolating to self initially but irritable and agitated with peers at times but able to be redirected but ultimately cooperative on the unit and compliant with medications. Last night patient was documented to have slept throughout the night. Merline Bellows was evaluated this morning for continuity of care.  On examination, Merline Bellows is less irritable and less guarded than previously, she is smiling and amenable to interview today with this writer, she is dressed casually and standing to speak. She states her mood is " great." She reports sleeping very well, endorsing at least 8 hours of uninterrupted sleep with no difficulty falling asleep or staying asleep or reported nightmares, she states she likes the room that she is currently in and her energy level today is adequate. Her appetite has been good and she endorses eating breakfast without difficulty. She denies any adverse effects from medications. Her goals for today are to remain in behavioral control, medication compliant, and continue working toward discharge. Today, Preston Graham is marilin for safety on the unit. She denies suicidal ideation, homicidal ideation, auditory hallucinations, and visual hallucinations. Of note, when asked why the patient is admitted she is unable to state a specific reason as to why she is admitted to psychiatric unit, however does states she feels as though she is making improvements to her behavioral health with the medications specifically citing improvements in her sleep, energy, and ability to attend to ADLs, she denies any difficulty with staff or peers on the unit at this time with this writer, when confronted about previously reported discussion between Preston Graham and another patient where she was encouraging another patient to discontinue her medication she states she never said any of this and that she does not interfere with other patients care she knows that she is here to work on her own health. She has no questions, comments, or concerns at this time. VS: Reviewed, within normal limits    Progress Toward Goals: Slow improvement    Psychiatric Review of Systems:  Behavior over the last 24 hours: improved  Sleep: improving  Appetite: adequate  Medication side effects: none verbalized  Medical ROS: Complete review of systems is negative except as noted above.     Vital signs in last 24 hours:  Temp:  [97.6 °F (36.4 °C)-97.8 °F (36.6 °C)] 97.8 °F (36.6 °C)  HR:  [83-84] 84  Resp:  [16] 16  BP: (105-116)/(60-65) 116/60    Mental Status Exam:    Appearance:  alert, Intense eye contact, appears stated age, casually dressed, appropriate grooming and hygiene, Long dark parted hair and smiling   Behavior:  calm and Less guarded, superficially cooperative   Speech:  spontaneous, clear, normal rate, normal volume and coherent   Mood:  "Great"   Affect:  overbright, increased in intensity, mood-congruent   Thought Process:  illogical, goal directed   Associations: intact associations   Thought Content:  persecutory delusions, paranoid ideation   Perceptual Disturbances: no reported hallucinations, does not appear to be responding to internal stimuli at this time and Appears internally preoccupied when pacing around the unit   Risk Potential: Suicidal ideation - None at present  Homicidal ideation - None at present  Potential for aggression - Yes, due to acute psychosis   Sensorium:  oriented to person, place and time/date   Memory:  recent memory impaired   Consciousness:  alert and awake   Attention/Concentration: attention span and concentration are age appropriate   Insight:  poor   Judgment: poor   Gait/Station: normal gait/station   Motor Activity: no abnormal movements     Current Medications:  Current Facility-Administered Medications   Medication Dose Route Frequency Provider Last Rate   • acetaminophen  650 mg Oral Q6H PRN Venita Nissen, MD     • acetaminophen  650 mg Oral Q4H PRN Venita Nissen, MD     • acetaminophen  975 mg Oral Q6H PRN Venita Nissen, MD     • aluminum-magnesium hydroxide-simethicone  30 mL Oral Q4H PRN Venita Nissen, MD     • haloperidol lactate  2.5 mg Intramuscular Q4H PRN Max 4/day Venita Nissen, MD      And   • LORazepam  1 mg Intramuscular Q4H PRN Max 4/day Venita Nissen, MD      And   • benztropine  0.5 mg Intramuscular Q4H PRN Max 4/day Venita Nissen, MD     • haloperidol lactate  5 mg Intramuscular Q4H PRN Max 4/day Venita Nissen, MD      And   • LORazepam  2 mg Intramuscular Q4H PRN Max 4/day Apollo Renteria MD      And   • benztropine  1 mg Intramuscular Q4H PRN Max 4/day Apollo Renteria MD     • benztropine  1 mg Oral Q4H PRN Max 6/day Apollo Renteria MD     • bisacodyl  10 mg Rectal Daily PRN Apollo Renteria MD     • hydrOXYzine HCL  50 mg Oral Q6H PRN Max 4/day Apollo Renteria MD      Or   • diphenhydrAMINE  50 mg Intramuscular Q6H PRN Apollo Renteria MD     • ergocalciferol  50,000 Units Oral Weekly MARY Arzola     • haloperidol  1 mg Oral Q6H PRN Apollo Renteria MD     • haloperidol  2.5 mg Oral Q4H PRN Max 4/day Apollo Renteria MD     • haloperidol  5 mg Oral Q4H PRN Max 4/day Apollo Renteria MD     • hydrOXYzine HCL  100 mg Oral Q6H PRN Max 4/day Apollo Renteria MD      Or   • LORazepam  2 mg Intramuscular Q6H PRN Apollo Renteria MD     • hydrOXYzine HCL  25 mg Oral Q6H PRN Max 4/day Apollo Renteria MD     • melatonin  6 mg Oral HS Clemencia Art DO     • OLANZapine  20 mg Oral HS Clemencia Art DO     • polyethylene glycol  17 g Oral Daily PRN Apollo Renteria MD     • senna-docusate sodium  1 tablet Oral Daily PRN Apollo Renteria MD     • senna-docusate sodium  1 tablet Oral BID Justice Harvey DO     • traZODone  100 mg Oral HS PRN Pablo Fanti C Holter, DO         Behavioral Health Medications: all current active meds have been reviewed. Changes as in plan section above. Laboratory results:  I have personally reviewed all pertinent laboratory/tests results. No results found for this or any previous visit (from the past 48 hour(s)). This note has been constructed using a voice recognition system. There may be translation, syntax, or grammatical errors. If you have any questions, please contact the dictating author.     Stephanie Peterson DO  Psychiatry Residency, PGY-2

## 2023-08-20 NOTE — PROGRESS NOTES
[facility] Group Note     08/20/23 1300   Activity/Group Checklist   Group Life Skills  (Teamwork and Communication)   Attendance Attended   Attendance Duration (min) 16-30  (in and out of group)   Interactions Interacted appropriately  (did not participate in activity, but offered encouragement to peers)   Affect/Mood Appropriate;Bright  (Preoccupied)   Goals Achieved Able to listen to others; Able to engage in interactions; Able to reflect/comment on own behavior;Able to recieve feedback; Able to give feedback to another

## 2023-08-21 PROCEDURE — 99232 SBSQ HOSP IP/OBS MODERATE 35: CPT | Performed by: PSYCHIATRY & NEUROLOGY

## 2023-08-21 RX ORDER — AMOXICILLIN 250 MG
2 CAPSULE ORAL 2 TIMES DAILY
Status: DISCONTINUED | OUTPATIENT
Start: 2023-08-21 | End: 2023-08-27

## 2023-08-21 RX ADMIN — OLANZAPINE 25 MG: 10 TABLET, ORALLY DISINTEGRATING ORAL at 21:12

## 2023-08-21 RX ADMIN — Medication 6 MG: at 21:12

## 2023-08-21 RX ADMIN — ACETAMINOPHEN 650 MG: 325 TABLET ORAL at 06:22

## 2023-08-21 RX ADMIN — SENNOSIDES AND DOCUSATE SODIUM 1 TABLET: 50; 8.6 TABLET ORAL at 09:04

## 2023-08-21 RX ADMIN — SENNOSIDES AND DOCUSATE SODIUM 2 TABLET: 50; 8.6 TABLET ORAL at 18:09

## 2023-08-21 NOTE — NURSING NOTE
Patient has been awake, alert, and visible intermittently out in the milieu. Patient reports blood coming out when sneezing. No other symptoms present. Patient remain pleasant upon approach. Patient reported experiencing a small bowel movement.  Patient remain Q 7 min check

## 2023-08-21 NOTE — TREATMENT TEAM
08/21/23 9380   Activity/Group Checklist   Group Community meeting   Attendance Attended   Attendance Duration (min) 16-30   Interactions Interacted appropriately   Affect/Mood Appropriate   Goals Achieved Able to listen to others; Able to engage in interactions; Able to self-disclose; Able to recieve feedback

## 2023-08-21 NOTE — PLAN OF CARE
Problem: DISCHARGE PLANNING  Goal: Discharge to home or other facility with appropriate resources  Description: INTERVENTIONS:  - Identify barriers to discharge w/patient and caregiver  - Arrange for needed discharge resources and transportation as appropriate  - Identify discharge learning needs (meds, wound care, etc.)  - Arrange for interpretive services to assist at discharge as needed  - Refer to Case Management Department for coordinating discharge planning if the patient needs post-hospital services based on physician/advanced practitioner order or complex needs related to functional status, cognitive ability, or social support system  Outcome: Progressing     Problem: Alteration in Thoughts and Perception  Goal: Verbalize thoughts and feelings  Description: Interventions:  - Promote a nonjudgmental and trusting relationship with the patient through active listening and therapeutic communication  - Assess patient's level of functioning, behavior and potential for risk  - Engage patient in 1 on 1 interactions  - Encourage patient to express fears, feelings, frustrations, and discuss symptoms    - [place] patient to reality, help patient recognize reality-based thinking   - Administer medications as ordered and assess for potential side effects  - Provide the patient education related to the signs and symptoms of the illness and desired effects of prescribed medications  Outcome: Progressing  Goal: Refrain from acting on delusional thinking/internal stimuli  Description: Interventions:  - Monitor patient closely, per order   - Utilize least restrictive measures   - Set reasonable limits, give positive feedback for acceptable   - Administer medications as ordered and monitor of potential side effects  Outcome: Progressing  Goal: Agree to be compliant with medication regime, as prescribed and report medication side effects  Description: Interventions:  - Offer appropriate PRN medication and supervise ingestion; conduct AIMS, as needed   Outcome: Progressing  Goal: Attend and participate in unit activities, including therapeutic, recreational, and educational groups  Description: Interventions:  -Encourage Visitation and family involvement in care  Outcome: Progressing     Problem: Ineffective Coping  Goal: Participates in unit activities  Description: Interventions:  - Provide therapeutic environment   - Provide required programming   - Redirect inappropriate behaviors   Outcome: Progressing     Problem: MAGEN  Goal: Will exhibit normal sleep and speech and no impulsivity  Description: INTERVENTIONS:  - Administer medication as ordered  - Set limits on impulsive behavior  - Make attempts to decrease external stimuli as possible  Outcome: Progressing     Problem: PSYCHOSIS  Goal: Will report no hallucinations or delusions  Description: Interventions:  - Administer medication as  ordered  - Every waking shifts and PRN assess for the presence of hallucinations and or delusions  - Assist with reality testing to support increasing orientation  - Assess if patient's hallucinations or delusions are encouraging self-harm or harm to others and intervene as appropriate  Outcome: Progressing     Problem: SLEEP DISTURBANCE  Goal: Will exhibit normal sleeping pattern  Description: Interventions:  -  Assess the patients sleep pattern, noting recent changes  - Administer medication as ordered  - Decrease environmental stimuli, including noise, as appropriate during the night  - Encourage the patient to actively participate in unit groups and or exercise during the day to enhance ability to achieve adequate sleep at night  - Assess the patient, in the morning, encouraging a description of sleep experience  Outcome: Progressing     Problem: INVOLUNTARY ADMIT  Goal: Will cooperate with staff recommendations and doctor's orders and will demonstrate appropriate behavior  Description: INTERVENTIONS:  - Treat underlying conditions and offer medication as ordered  - Educate regarding involuntary admission procedures and rules  - Utilize positive consistent limit setting strategies to support patient and staff safety  Outcome: Progressing     Problem: SAFETY, RESTRAINT - VIOLENT/SELF-DESTRUCTIVE  Goal: Remains free of harm/injury from restraints (Restraint for Violent/Self-Destructive Behavior)  Description: INTERVENTIONS:  - Instruct patient/family regarding restraint use   - Assess and monitor physiologic and psychological status   - Provide interventions and comfort measures to meet assessed patient needs   - Ensure continuous in person monitoring is provided   - Identify and implement measures to help patient regain control  - Assess readiness for release of restraint  Outcome: Progressing  Goal: Returns to optimal restraint-free functioning  Description: INTERVENTIONS:  - Assess the patient's behavior and symptoms that indicate continued need for restraint  - Identify and implement measures to help patient regain control  - Assess readiness for release of restraint   Outcome: Progressing     Problem: Nutrition/Hydration-ADULT  Goal: Nutrient/Hydration intake appropriate for improving, restoring or maintaining nutritional needs  Description: Monitor and assess patient's nutrition/hydration status for malnutrition. Collaborate with interdisciplinary team and initiate plan and interventions as ordered. Monitor patient's weight and dietary intake as ordered or per policy. Utilize nutrition screening tool and intervene as necessary. Determine patient's food preferences and provide high-protein, high-caloric foods as appropriate.      INTERVENTIONS:  - Monitor oral intake, urinary output, labs, and treatment plans  - Assess nutrition and hydration status and recommend course of action  - Evaluate amount of meals eaten  - Assist patient with eating if necessary   - Allow adequate time for meals  - Recommend/ encourage appropriate diets, oral nutritional supplements, and vitamin/mineral supplements  - Order, calculate, and assess calorie counts as needed  - Recommend, monitor, and adjust tube feedings and TPN/PPN based on assessed needs  - Assess need for intravenous fluids  - Provide specific nutrition/hydration education as appropriate  - Include patient/family/caregiver in decisions related to nutrition  Outcome: Progressing

## 2023-08-21 NOTE — PROGRESS NOTES
Progress Note - Behavioral Health   Fernando Arroyo 28 y.o. female MRN: [id number]  Unit/Bed#: [facility] [place] 806317-14 Encounter: [id number]    Assessment/Plan   Principal Problem:    Psychosis ([address]) rule out schizophrenia versus schizoaffective disorder  Active Problems:    Medical clearance for psychiatric admission    Mild tetrahydrocannabinol (THC) abuse      Recommended Treatment:   Increase Zyprexa Zydis to 25 mg qHS for psychosis, mouth checks  Continue melatonin 6 mg qHS for insomnia  Continue Senokot 1 tablet BID for bowel regimen    Continue with group therapy, milieu therapy and occupational therapy. Continue frequent safety checks and vitals per unit protocol. Case discussed with treatment team.  Risks, benefits and possible side effects of Medications: Risks, benefits, and possible side effects of medications have been explained to the patient, who verbalizes understanding    Current Legal Status: 303  Disposition: TBD, coordinating with   ------------------------------------------------------------    Subjective: Per nursing report, Felicia Devine has been paranoid, agitated at times though able to be redirected and overall cooperative on the unit, compliant with scheduled psychiatric medications. She refused scheduled Senokot S in the evening on 08/19 and 08/20. PRNs: Atarax 100 mg p.o. at 0911 on 08/20, MiraLAX 17 g at 1808 on 08/20, Senokot S at 1423 on 08/19    Today, Felicia Devine was seen and evaluated for continuity of care. On evaluation, patient is superficially cooperative and pleasant, over bright and affect and smiling inappropriately at times. She is circumstantial at times, though less disorganized. She reports feeling "so-so" this morning. Patient states her "mood been swinging since the medications" referring to starting her psychiatric medications. She shares that the medications she received yesterday night, however, helped with her sleep and as result has been sleeping "good the past 3 days".   She reports an estimated 8 hours of sleep overnight. She reports that her energy levels are "90%" this morning due to the pain medications helping with her soreness. She reports an adequate appetite for breakfast morning. Patient denies medication side effects at this time and reports tolerating current regimen well. She reports her only complaint is that she is not moving her bowels as regularly. She reports her last bowel movement was on 08/19 but denies feeling constipated at time of interview. She reports she will ask the nurse for MiraLAX today and was also relayed to RN by this writer. Patient denies SI/HI/AVH. Progress Toward Goals: slow improvement    Psychiatric Review of Systems:  Behavior over the last 24 hours: Improving slowly  Sleep: improving  Appetite: adequate  Medication side effects: none verbalized  ROS: Complete review of systems is negative except as noted above.     Vital signs in last 24 hours:   Temp:  [97.2 °F (36.2 °C)-97.3 °F (36.3 °C)] 97.2 °F (36.2 °C)  HR:  [67-85] 85  Resp:  [16] 16  BP: ()/(57-60) 92/57    Mental Status Exam:  Appearance:   [place] appearing Brown skinned female, in no acute distress, alert, improving and less intense eye contact, appears stated age, marginal grooming/hygiene, Dressed in hospital attire, unkempt hair, smiling inappropriately at times and Overbright at times   Behavior:  calm, superficially cooperative and sitting comfortably   Motor: no abnormal movements and normal gait and balance   Speech:  spontaneous, clear, normal rate, normal volume and coherent   Mood:  "So-so"   Affect:  overbright, smiling inappropriately at times   Thought Process:  Less disorganized, circumstantial at times   Thought Content: paranoid ideation   Perceptual disturbances: no reported hallucinations and does not appear to be responding to internal stimuli at this time; appears internally preoccupied at times   Risk Potential: No active or passive suicidal or homicidal ideation was verbalized during interview, Potential for aggression due to acute psychosis   Cognition: oriented to self and situation, appears to be of average intelligence and cognition not formally tested   Insight:  Poor   Judgment: Limited     Current Medications:  Current Facility-Administered Medications   Medication Dose Route Frequency Provider Last Rate   • acetaminophen  650 mg Oral Q6H PRN Aliya Marin MD     • acetaminophen  650 mg Oral Q4H PRN Aliya Marin MD     • acetaminophen  975 mg Oral Q6H PRN Aliya Marin MD     • aluminum-magnesium hydroxide-simethicone  30 mL Oral Q4H PRN Aliya Marin MD     • haloperidol lactate  2.5 mg Intramuscular Q4H PRN Max 4/day Aliya Marin MD      And   • LORazepam  1 mg Intramuscular Q4H PRN Max 4/day Aliya Marin MD      And   • benztropine  0.5 mg Intramuscular Q4H PRN Max 4/day Aliya Marin MD     • haloperidol lactate  5 mg Intramuscular Q4H PRN Max 4/day Aliya Marin MD      And   • LORazepam  2 mg Intramuscular Q4H PRN Max 4/day Aliya Marin MD      And   • benztropine  1 mg Intramuscular Q4H PRN Max 4/day Aliya Marin MD     • benztropine  1 mg Oral Q4H PRN Max 6/day Aliya Marin MD     • bisacodyl  10 mg Rectal Daily PRN Aliya Marin MD     • hydrOXYzine HCL  50 mg Oral Q6H PRN Max 4/day Aliya Marin MD      Or   • diphenhydrAMINE  50 mg Intramuscular Q6H PRN Aliya Marin MD     • ergocalciferol  50,000 Units Oral Weekly MARY Arzola     • haloperidol  1 mg Oral Q6H PRN Aliya Marin MD     • haloperidol  2.5 mg Oral Q4H PRN Max 4/day Aliya Marin MD     • haloperidol  5 mg Oral Q4H PRN Max 4/day Aliya Marin MD     • hydrOXYzine HCL  100 mg Oral Q6H PRN Max 4/day Aliya Marin MD      Or   • LORazepam  2 mg Intramuscular Q6H PRN Aliya Marin MD     • hydrOXYzine HCL  25 mg Oral Q6H PRN Max 4/day Aliya Marin MD     • melatonin  6 mg Oral HS Leodan Mcwilliams DO     • OLANZapine  25 mg Oral HS Clemencia Art DO     • polyethylene glycol  17 g Oral Daily PRN Lashonda Smiley MD     • senna-docusate sodium  1 tablet Oral Daily PRN Lashonda Smiley MD     • senna-docusate sodium  2 tablet Oral BID Walt Boone MD     • traZODone  100 mg Oral HS PRN Georgia C Holter, DO         Behavioral Health Medications: all current active meds have been reviewed. Changes as in plan section above. Laboratory results:  I have personally reviewed all pertinent laboratory/tests results. No results found for this or any previous visit (from the past 48 hour(s)).      Leodan Mcwilliams DO

## 2023-08-21 NOTE — TREATMENT TEAM
08/21/23 1300   Activity/Group Checklist   Group   (recovery group)   Attendance Attended   Attendance Duration (min) 46-60   Interactions Interacted appropriately   Affect/Mood Appropriate   Goals Achieved Discussed coping strategies; Discussed self-esteem issues; Able to listen to others; Able to engage in interactions; Able to reflect/comment on own behavior;Able to self-disclose; Able to recieve feedback

## 2023-08-21 NOTE — PLAN OF CARE
SW attempted to call pt's sister on phone number provided ([phone]). Automated message stated phone number was disconnected.

## 2023-08-21 NOTE — NURSING NOTE
Pt denies SI/HI/AH/VH but appears internally preoccupied. Present in dayroom and milieu. Social with peers. Medication and meal compliant. Pt is pleasant but restless, pacing the milieu and dancing. No further concerns expressed at this time. Plan of care ongoing.

## 2023-08-21 NOTE — PROGRESS NOTES
08/21/23 0833   Team Meeting   Meeting Type Daily Rounds   Team Members Present   Team Members Present Physician;Nurse;   Physician Team Member [id number] Team Member [facility] Management Team Member [place]   Patient/Family Present   Patient Present No   Patient's Family Present No     Pt c/o constipation, received PRN [place]ot. Pleasant, cooperative but at times irritable. Intrusive with peers, requiring redirection. Med/meal compliant. Demanding wrist band be cut off. Pt demanding peers be kept away from her, unable to identify why. Required constant redirection, poor insight into need for treatment. PRN Atarax over the weekend. Remains superficial and guarded. Discharge to be determined.

## 2023-08-21 NOTE — NURSING NOTE
Patient has been visible here and there in the milieu but she is not social with peers. She is walking in the good and in her room, but did come into milieu for snack. She remains superficial and guarded with staff. She was cooperative with HS medications.

## 2023-08-22 PROCEDURE — 99232 SBSQ HOSP IP/OBS MODERATE 35: CPT | Performed by: PSYCHIATRY & NEUROLOGY

## 2023-08-22 RX ADMIN — SENNOSIDES AND DOCUSATE SODIUM 2 TABLET: 50; 8.6 TABLET ORAL at 18:00

## 2023-08-22 RX ADMIN — Medication 6 MG: at 21:14

## 2023-08-22 RX ADMIN — OLANZAPINE 25 MG: 10 TABLET, ORALLY DISINTEGRATING ORAL at 21:15

## 2023-08-22 RX ADMIN — SENNOSIDES AND DOCUSATE SODIUM 2 TABLET: 50; 8.6 TABLET ORAL at 09:57

## 2023-08-22 RX ADMIN — TRAZODONE HYDROCHLORIDE 100 MG: 100 TABLET ORAL at 21:15

## 2023-08-22 NOTE — NURSING NOTE
Pt denies SI/HI/AH/VH but at times appears internally preoccupied. Present in dayroom and milieu. Social with select peers. Medication and meal compliant. Plan of care ongoing.

## 2023-08-22 NOTE — PLAN OF CARE
Problem: DISCHARGE PLANNING  Goal: Discharge to home or other facility with appropriate resources  Description: INTERVENTIONS:  - Identify barriers to discharge w/patient and caregiver  - Arrange for needed discharge resources and transportation as appropriate  - Identify discharge learning needs (meds, wound care, etc.)  - Arrange for interpretive services to assist at discharge as needed  - Refer to Case Management Department for coordinating discharge planning if the patient needs post-hospital services based on physician/advanced practitioner order or complex needs related to functional status, cognitive ability, or social support system  Outcome: Progressing     Problem: Alteration in Thoughts and Perception  Goal: Verbalize thoughts and feelings  Description: Interventions:  - Promote a nonjudgmental and trusting relationship with the patient through active listening and therapeutic communication  - Assess patient's level of functioning, behavior and potential for risk  - Engage patient in 1 on 1 interactions  - Encourage patient to express fears, feelings, frustrations, and discuss symptoms    - [place] patient to reality, help patient recognize reality-based thinking   - Administer medications as ordered and assess for potential side effects  - Provide the patient education related to the signs and symptoms of the illness and desired effects of prescribed medications  Outcome: Progressing  Goal: Refrain from acting on delusional thinking/internal stimuli  Description: Interventions:  - Monitor patient closely, per order   - Utilize least restrictive measures   - Set reasonable limits, give positive feedback for acceptable   - Administer medications as ordered and monitor of potential side effects  Outcome: Not Progressing  Goal: Agree to be compliant with medication regime, as prescribed and report medication side effects  Description: Interventions:  - Offer appropriate PRN medication and supervise ingestion; conduct AIMS, as needed   Outcome: Progressing  Goal: Attend and participate in unit activities, including therapeutic, recreational, and educational groups  Description: Interventions:  -Encourage Visitation and family involvement in care  Outcome: Progressing     Problem: Ineffective Coping  Goal: Participates in unit activities  Description: Interventions:  - Provide therapeutic environment   - Provide required programming   - Redirect inappropriate behaviors   Outcome: Progressing     Problem: MAGEN  Goal: Will exhibit normal sleep and speech and no impulsivity  Description: INTERVENTIONS:  - Administer medication as ordered  - Set limits on impulsive behavior  - Make attempts to decrease external stimuli as possible  Outcome: Progressing     Problem: PSYCHOSIS  Goal: Will report no hallucinations or delusions  Description: Interventions:  - Administer medication as  ordered  - Every waking shifts and PRN assess for the presence of hallucinations and or delusions  - Assist with reality testing to support increasing orientation  - Assess if patient's hallucinations or delusions are encouraging self-harm or harm to others and intervene as appropriate  Outcome: Progressing     Problem: SLEEP DISTURBANCE  Goal: Will exhibit normal sleeping pattern  Description: Interventions:  -  Assess the patients sleep pattern, noting recent changes  - Administer medication as ordered  - Decrease environmental stimuli, including noise, as appropriate during the night  - Encourage the patient to actively participate in unit groups and or exercise during the day to enhance ability to achieve adequate sleep at night  - Assess the patient, in the morning, encouraging a description of sleep experience  Outcome: Progressing     Problem: INVOLUNTARY ADMIT  Goal: Will cooperate with staff recommendations and doctor's orders and will demonstrate appropriate behavior  Description: INTERVENTIONS:  - Treat underlying conditions and offer medication as ordered  - Educate regarding involuntary admission procedures and rules  - Utilize positive consistent limit setting strategies to support patient and staff safety  Outcome: Progressing     Problem: SAFETY, RESTRAINT - VIOLENT/SELF-DESTRUCTIVE  Goal: Remains free of harm/injury from restraints (Restraint for Violent/Self-Destructive Behavior)  Description: INTERVENTIONS:  - Instruct patient/family regarding restraint use   - Assess and monitor physiologic and psychological status   - Provide interventions and comfort measures to meet assessed patient needs   - Ensure continuous in person monitoring is provided   - Identify and implement measures to help patient regain control  - Assess readiness for release of restraint  Outcome: Completed  Goal: Returns to optimal restraint-free functioning  Description: INTERVENTIONS:  - Assess the patient's behavior and symptoms that indicate continued need for restraint  - Identify and implement measures to help patient regain control  - Assess readiness for release of restraint   Outcome: Completed     Problem: Nutrition/Hydration-ADULT  Goal: Nutrient/Hydration intake appropriate for improving, restoring or maintaining nutritional needs  Description: Monitor and assess patient's nutrition/hydration status for malnutrition. Collaborate with interdisciplinary team and initiate plan and interventions as ordered. Monitor patient's weight and dietary intake as ordered or per policy. Utilize nutrition screening tool and intervene as necessary. Determine patient's food preferences and provide high-protein, high-caloric foods as appropriate.      INTERVENTIONS:  - Monitor oral intake, urinary output, labs, and treatment plans  - Assess nutrition and hydration status and recommend course of action  - Evaluate amount of meals eaten  - Assist patient with eating if necessary   - Allow adequate time for meals  - Recommend/ encourage appropriate diets, oral nutritional supplements, and vitamin/mineral supplements  - Order, calculate, and assess calorie counts as needed  - Recommend, monitor, and adjust tube feedings and TPN/PPN based on assessed needs  - Assess need for intravenous fluids  - Provide specific nutrition/hydration education as appropriate  - Include patient/family/caregiver in decisions related to nutrition  Outcome: Progressing

## 2023-08-22 NOTE — PROGRESS NOTES
08/22/23 0835   Team Meeting   Meeting Type Daily Rounds   Team Members Present   Team Members Present Physician;Nurse;   Physician Team Member [id number] Member [place]   Patient/Family Present   Patient Present No   Patient's Family Present No   Pt visible intermittently. Pleasant, calm, cooperative. Reporting small bowel movement. Med/meal compliant. Discharge to be determined.

## 2023-08-22 NOTE — TREATMENT TEAM
08/22/23 3477   Activity/Group Checklist   Group Community meeting   Attendance Attended   Attendance Duration (min) 46-60   Interactions Interacted appropriately   Affect/Mood Appropriate   Goals Achieved Able to listen to others; Able to engage in interactions; Able to self-disclose; Able to recieve feedback

## 2023-08-22 NOTE — TREATMENT TEAM
08/22/23 1300   Activity/Group Checklist   Group   (recovery group)   Attendance Attended   Attendance Duration (min) 46-60   Interactions Interacted appropriately   Affect/Mood Appropriate   Goals Achieved Discussed coping strategies; Discussed self-esteem issues; Able to listen to others; Able to engage in interactions; Able to reflect/comment on own behavior;Able to self-disclose; Able to recieve feedback     Patient is sensitive of others.

## 2023-08-23 PROCEDURE — 99232 SBSQ HOSP IP/OBS MODERATE 35: CPT | Performed by: PSYCHIATRY & NEUROLOGY

## 2023-08-23 RX ADMIN — SENNOSIDES AND DOCUSATE SODIUM 2 TABLET: 50; 8.6 TABLET ORAL at 09:55

## 2023-08-23 RX ADMIN — OLANZAPINE 25 MG: 10 TABLET, ORALLY DISINTEGRATING ORAL at 21:07

## 2023-08-23 RX ADMIN — ERGOCALCIFEROL 50000 UNITS: 1.25 CAPSULE ORAL at 09:55

## 2023-08-23 RX ADMIN — Medication 6 MG: at 21:07

## 2023-08-23 RX ADMIN — SENNOSIDES AND DOCUSATE SODIUM 2 TABLET: 50; 8.6 TABLET ORAL at 17:58

## 2023-08-23 NOTE — PLAN OF CARE
LILLIE called [facility] MH/DS to schedule Liability assessment for Pt.  Pt is scheduled for appointment with Judy on 8/30 at 10am.

## 2023-08-23 NOTE — PROGRESS NOTES
08/23/23 1000   Activity/Group Checklist   Group Other (Comment)  (Group Art Therapy/Psychodynamic, Open Choice for the Weekly Start with Discussion)   Attendance Attended   Attendance Duration (min) Greater than 60   Interactions Interacted appropriately   Affect/Mood Appropriate   Goals Achieved Discussed coping strategies; Able to listen to others; Able to engage in interactions; Able to recieve feedback; Able to give feedback to another  (Able to engage materials; full participation)

## 2023-08-23 NOTE — DISCHARGE INSTR - OTHER ORDERS
CRISIS INFORMATION  Mental Health Matters! Help is available. Please call. Mental Health Crisis Intervention and Suicide Prevention Hot Line: Dial 988  Local Crisis number: 084-465-4031 or toll free: [phone] - 24-Hour Crisis & Emergency Services  Telephone Crisis Intervention is available 24 hours a day 7 days a week. Mobile Crisis Intervention is available to be dispatched to the three [facility] during certain times and can be requested at the same numbers. There is also a crisis residence available for those who need that level of care. [employer]:  [address]    Drug and Alcohol Services  For information on how to access Drug and Alcohol treatment services please contact:  After hours 24/7 number:  [facility] at [phone]  During regular business hours call:  [facility]  [address] [facility], [address] [place] [address]  Phone: [phone]    [address] [facility] Ext [phone])  [address] [facility], [facility] [address]  Phone: [phone] Alphonse Oden [address], 1 [facility] [address]   New Admissions - [phone]  [address] [facility] [address] - [phone]   [facility]  [address] [facility] [address]  Phone [phone] 3[address] [facility] for [employer] offers emergency short-term housing and supports to women with mental health needs and substance abuse who have an urgent need. [facility] provides safe, emergency housing for a maximum of 21 women (children may also be accepted). At [facility] intervention is provided to help individuals gain stability through case management, individualized residential and behavioral supports to prepare them for transitional or permanent housing.   Women are referred to [employer] for emergency housing via an intake process that occurs at the [facility] of Supportive Housing. Two intake locations are available, depending on the day and time. Monday-Friday, 7 a.m. to 3 p.m.: [facility], [address] [place], [place], [address] Phone: [phone], [phone]   After hours, weekends/holidays: [facility], [address] [facility] ([address] [facility] and [place], [place], [address] Phone: [address] emergency shelter  If [facility] seeking Emergency Shelter at [facility] call our Admissions Specialist at [phone] x [phone]. \    [address] [facility]  Referrals are accepted through the HELP office at [phone]. After business hours, contact Crisis Intervention at [phone]. INEXPENSIVE HOTELS IN [facility]  [address] [facility], [address] [facility]  P: [phone]  $700/month for a room    [facility]  [address] [place] [address],[address] Floor. [facility], [address] [facility]  P: [phone]  $400/week + tax  complimentary breakfast and wifi, laundry facility on site    [facility], [place] [address]  P: [phone]  $520/month  140 security deposit and 140 / week and $700 to move in    [address] [facility] [address], [address]  P: [phone]  $250/week    [employer] [address] [facility]. [facility], [address] [facility]  P: [phone]  pet-friendly, complimentary wifi, refrigerator    [facility]  [address] [facility] [address], [place] [address]  P: [phone]  $275 + tax/week  microwave, mini fridge, free wifi

## 2023-08-23 NOTE — SOCIAL WORK
LILLIE spoke with Pt's niece who stated pt's landlord has evicted pt. She is able to go stay with niece or brother upon discharge. LILLIE facilitated phone call with pt and niece. LILLIE advised pt has been evicted from the property. Pt did not appear to understand this and required significant education.  Pt stated she will go stay with her niece upon discharge from the hospital.

## 2023-08-23 NOTE — TREATMENT TEAM
08/23/23 6411   Activity/Group Checklist   Group Community meeting   Attendance Attended   Attendance Duration (min) 16-30   Interactions Interacted appropriately   Affect/Mood Appropriate   Goals Achieved Able to listen to others; Able to engage in interactions; Able to self-disclose; Able to recieve feedback

## 2023-08-23 NOTE — PLAN OF CARE
Problem: DISCHARGE PLANNING  Goal: Discharge to home or other facility with appropriate resources  Description: INTERVENTIONS:  - Identify barriers to discharge w/patient and caregiver  - Arrange for needed discharge resources and transportation as appropriate  - Identify discharge learning needs (meds, wound care, etc.)  - Arrange for interpretive services to assist at discharge as needed  - Refer to Case Management Department for coordinating discharge planning if the patient needs post-hospital services based on physician/advanced practitioner order or complex needs related to functional status, cognitive ability, or social support system  Outcome: Not Progressing     Problem: Alteration in Thoughts and Perception  Goal: Verbalize thoughts and feelings  Description: Interventions:  - Promote a nonjudgmental and trusting relationship with the patient through active listening and therapeutic communication  - Assess patient's level of functioning, behavior and potential for risk  - Engage patient in 1 on 1 interactions  - Encourage patient to express fears, feelings, frustrations, and discuss symptoms    - [place] patient to reality, help patient recognize reality-based thinking   - Administer medications as ordered and assess for potential side effects  - Provide the patient education related to the signs and symptoms of the illness and desired effects of prescribed medications  Outcome: Not Progressing  Goal: Refrain from acting on delusional thinking/internal stimuli  Description: Interventions:  - Monitor patient closely, per order   - Utilize least restrictive measures   - Set reasonable limits, give positive feedback for acceptable   - Administer medications as ordered and monitor of potential side effects  Outcome: Not Progressing  Goal: Agree to be compliant with medication regime, as prescribed and report medication side effects  Description: Interventions:  - Offer appropriate PRN medication and supervise ingestion; conduct AIMS, as needed   Outcome: Not Progressing  Goal: Attend and participate in unit activities, including therapeutic, recreational, and educational groups  Description: Interventions:  -Encourage Visitation and family involvement in care  Outcome: Not Progressing     Problem: MAGEN  Goal: Will exhibit normal sleep and speech and no impulsivity  Description: INTERVENTIONS:  - Administer medication as ordered  - Set limits on impulsive behavior  - Make attempts to decrease external stimuli as possible  Outcome: Not Progressing     Problem: PSYCHOSIS  Goal: Will report no hallucinations or delusions  Description: Interventions:  - Administer medication as  ordered  - Every waking shifts and PRN assess for the presence of hallucinations and or delusions  - Assist with reality testing to support increasing orientation  - Assess if patient's hallucinations or delusions are encouraging self-harm or harm to others and intervene as appropriate  Outcome: Not Progressing     Problem: SLEEP DISTURBANCE  Goal: Will exhibit normal sleeping pattern  Description: Interventions:  -  Assess the patients sleep pattern, noting recent changes  - Administer medication as ordered  - Decrease environmental stimuli, including noise, as appropriate during the night  - Encourage the patient to actively participate in unit groups and or exercise during the day to enhance ability to achieve adequate sleep at night  - Assess the patient, in the morning, encouraging a description of sleep experience  Outcome: Not Progressing     Problem: INVOLUNTARY ADMIT  Goal: Will cooperate with staff recommendations and doctor's orders and will demonstrate appropriate behavior  Description: INTERVENTIONS:  - Treat underlying conditions and offer medication as ordered  - Educate regarding involuntary admission procedures and rules  - Utilize positive consistent limit setting strategies to support patient and staff safety  Outcome: Not Progressing     Problem: Nutrition/Hydration-ADULT  Goal: Nutrient/Hydration intake appropriate for improving, restoring or maintaining nutritional needs  Description: Monitor and assess patient's nutrition/hydration status for malnutrition. Collaborate with interdisciplinary team and initiate plan and interventions as ordered. Monitor patient's weight and dietary intake as ordered or per policy. Utilize nutrition screening tool and intervene as necessary. Determine patient's food preferences and provide high-protein, high-caloric foods as appropriate.      INTERVENTIONS:  - Monitor oral intake, urinary output, labs, and treatment plans  - Assess nutrition and hydration status and recommend course of action  - Evaluate amount of meals eaten  - Assist patient with eating if necessary   - Allow adequate time for meals  - Recommend/ encourage appropriate diets, oral nutritional supplements, and vitamin/mineral supplements  - Order, calculate, and assess calorie counts as needed  - Recommend, monitor, and adjust tube feedings and TPN/PPN based on assessed needs  - Assess need for intravenous fluids  - Provide specific nutrition/hydration education as appropriate  - Include patient/family/caregiver in decisions related to nutrition  Outcome: Not Progressing     Problem: Ineffective Coping  Goal: Participates in unit activities  Description: Interventions:  - Provide therapeutic environment   - Provide required programming   - Redirect inappropriate behaviors   Outcome: Not Progressing

## 2023-08-23 NOTE — NURSING NOTE
The patient is currently visible in the unit. Pt denies SI,HI. The patient requested the writer to provide her a good  phone number. Luis stated" she is not happy about her discharge plan and how she is going back to  same people who put her in the situation" . Patient is pleasant with the writer.  Patient remain Q 7 min check

## 2023-08-23 NOTE — NURSING NOTE
Remains less disorganized. Pleasant and cooperative. About the unit and attending group. No behavioral issues. Denies symptoms. Compliant with medications. No current complaints.

## 2023-08-23 NOTE — NURSING NOTE
PT observed visible interacting with selected peers, cooperative and calm. PT stated "I am still unsure why I am here because I was to go for rehab, not mental health place". PT denies SI/HI/VH/AH, compliant to scheduled meds and added Trazodone 100mg for poor sleep. No c/o pain/discomfort and no behavioral issure during this shift. Trazodone seem effective as PT was seeing sleeping at this time.

## 2023-08-23 NOTE — PROGRESS NOTES
08/23/23 0842   Team Meeting   Meeting Type Daily Rounds   Team Members Present   Team Members Present Physician;Nurse;   Physician Team Member [id number] [facility] Member Gladis   Patient/Family Present   Patient Present No   Patient's Family Present No     Pt is med/meal compliant. Denying symptoms. Visible, social with select peers. Pt continues to display poor insight into her illness. PRN Trazodone for sleep which was effective. Pt less superficial, paranoid. Discharge to be determined.

## 2023-08-23 NOTE — PROGRESS NOTES
Progress Note - Behavioral Health   Frank Arias 28 y.o. female MRN: [id number]  Unit/Bed#: Ana Lewis 121-97 Encounter: [id number]    Assessment/Plan   Principal Problem:    Psychosis ([address]) rule out schizophrenia versus schizoaffective disorder  Active Problems:    Medical clearance for psychiatric admission    Mild tetrahydrocannabinol (THC) abuse      Recommended Treatment:   Continue Zyprexa 25 mg nightly for psychosis, mouth checks  Continue melatonin 6 mg nightly for insomnia  Continue vitamin D2 50,000 IU for supplementation  Continue Senokot S2 tablets twice daily for bowel regimen      Continue with group therapy, milieu therapy and occupational therapy. Continue frequent safety checks and vitals per unit protocol. Case discussed with treatment team.  Risks, benefits and possible side effects of Medications: Risks, benefits, and possible side effects of medications have been explained to the patient, who verbalizes understanding    Current Legal Status: 303  Disposition: Coordinate with case management  ------------------------------------------------------------    Subjective: Per nursing report, Selina Torres has been cooperative on the unit and compliant with scheduled medications. He was noted to be internally preoccupied yesterday evening. Yesterday night, patient stated she is unsure why she is on the mental health unit and was interested in rehab. PRNs: Trazodone 100 mg p.o. at 2115. This morning, patient was noted to be less disorganized, cooperative and attending groups without any behavioral issues. Today, Selina Torres was seen and evaluated alongside attending physician. On evaluation, patient is less disorganized, less circumstantial and more linear. She reports feeling "50/50", "even", "balanced" this morning. Patient states she slept an estimated 7-8 hours night, though she reports her morning was "crazy" due to having received "too much medications last night".   Patient describes her energy levels as "%" and an adequate appetite for breakfast in the morning. She does report feeling constipated and states her last bowel movement was yesterday evening. She reports she would like to try having some orange juice. Patient was also encouraged to continue with bowel regimen, do some more walking and drink more water. Patient reports she plans to continue lapping the unit. Reports she "gotta move". Educated patient on possibility of akathisia, though she reports she does not want any prn medications or interventions at this time and would rather continue walking. Progress Toward Goals: slow improvement    Psychiatric Review of Systems:  Behavior over the last 24 hours: Improving slowly  Sleep: improving  Appetite: adequate  Medication side effects: constipation, reports last bowel movement yesterday  ROS: Complete review of systems is negative except as noted above.     Vital signs in last 24 hours:   Temp:  [97.2 °F (36.2 °C)-98.1 °F (36.7 °C)] 97.2 °F (36.2 °C)  HR:  [71-76] 76  Resp:  [16-18] 18  BP: (105-112)/(55-56) 112/55    Mental Status Exam:  Appearance:  alert, good eye contact, appears stated age and marginal grooming/hygiene, no acute distress   Behavior:  calm, cooperative, sitting comfortably and Left superficial   Motor: no abnormal movements and normal gait and balance   Speech:  spontaneous, clear, normal rate, normal volume and coherent   Mood:  "50/50", "even", "balanced"   Affect:  Brighter and more reactive, less inappropriate smiling   Thought Process:  Less disorganized, less circumstantial and more linear   Thought Content: mild paranoia   Perceptual disturbances: no reported hallucinations and does not appear to be responding to internal stimuli at this time; less internally preoccupied   Risk Potential: No active or passive suicidal or homicidal ideation was verbalized during interview   Cognition: oriented to self and situation, appears to be of average intelligence and cognition not formally tested   Insight:  Limited   Judgment: Limited     Current Medications:  Current Facility-Administered Medications   Medication Dose Route Frequency Provider Last Rate   • acetaminophen  650 mg Oral Q6H PRN Roxy Guerra MD     • acetaminophen  650 mg Oral Q4H PRN Roxy Guerra MD     • acetaminophen  975 mg Oral Q6H PRN Roxy Guerra MD     • aluminum-magnesium hydroxide-simethicone  30 mL Oral Q4H PRN Roxy Guerra MD     • haloperidol lactate  2.5 mg Intramuscular Q4H PRN Max 4/day Roxy Guerra MD      And   • LORazepam  1 mg Intramuscular Q4H PRN Max 4/day Roxy Guerra MD      And   • benztropine  0.5 mg Intramuscular Q4H PRN Max 4/day Roxy Guerra MD     • haloperidol lactate  5 mg Intramuscular Q4H PRN Max 4/day Roxy Guerra MD      And   • LORazepam  2 mg Intramuscular Q4H PRN Max 4/day Roxy Guerra MD      And   • benztropine  1 mg Intramuscular Q4H PRN Max 4/day Roxy Guerra MD     • benztropine  1 mg Oral Q4H PRN Max 6/day Roxy Guerra MD     • bisacodyl  10 mg Rectal Daily PRN Roxy Guerra MD     • hydrOXYzine HCL  50 mg Oral Q6H PRN Max 4/day Roxy Guerra MD      Or   • diphenhydrAMINE  50 mg Intramuscular Q6H PRN Roxy Guerra MD     • ergocalciferol  50,000 Units Oral Weekly MARY Arzola     • haloperidol  1 mg Oral Q6H PRN Roxy Guerra MD     • haloperidol  2.5 mg Oral Q4H PRN Max 4/day Roxy Guerra MD     • haloperidol  5 mg Oral Q4H PRN Max 4/day Roxy Guerra MD     • hydrOXYzine HCL  100 mg Oral Q6H PRN Max 4/day Roxy Guerra MD      Or   • LORazepam  2 mg Intramuscular Q6H PRN Roxy Guerra MD     • hydrOXYzine HCL  25 mg Oral Q6H PRN Max 4/day Roxy Guerra MD     • melatonin  6 mg Oral HS Clemencia Art DO     • OLANZapine  25 mg Oral HS Clemencia Art DO     • polyethylene glycol  17 g Oral Daily PRN Roxy Guerra MD     • senna-docusate sodium  1 tablet Oral Daily PRN Ladora Olszewski, MD     • senna-docusate sodium  2 tablet Oral BID Tasia Stafford MD     • traZODone  100 mg Oral HS PRN Georgia C Holter, DO         Behavioral Health Medications: all current active meds have been reviewed. Changes as in plan section above. Laboratory results:  I have personally reviewed all pertinent laboratory/tests results. No results found for this or any previous visit (from the past 48 hour(s)).      [place] DO

## 2023-08-23 NOTE — NURSING NOTE
Pt agitated and threw cup of ice in hallway, redirected to room for aggressive behavior and given 5mg IM Haldol at 1725, intervention not effective upon reassessment at 1825 as patient continued to show aggressive behavior by throwing another cup of ice toward peer without provocation. Pt asked to stay in room until medication took effect but would not comply and continued to be verbally abusive toward staff. Pt placed in seclusion restraint at 1830 for aggressive behavior. IM ativan 2mg given at 1830 for severe anxiety and IM haldol 5mg with cogentin 1mg at 1851 given after one time order obtained by Dr. Malik Oakes.

## 2023-08-23 NOTE — DISCHARGE INSTR - APPOINTMENTS
Ester Arnold or Mary, our Kat and Kodi, will be calling you after your discharge, on the phone number that you provided. They will be available as an additional support, if needed. If you wish to speak with one of them, you may contact Ester Arnold at [phone] or Bety Douglas at [phone].

## 2023-08-24 PROCEDURE — 99232 SBSQ HOSP IP/OBS MODERATE 35: CPT | Performed by: PSYCHIATRY & NEUROLOGY

## 2023-08-24 RX ADMIN — OLANZAPINE 25 MG: 10 TABLET, ORALLY DISINTEGRATING ORAL at 21:20

## 2023-08-24 RX ADMIN — HYDROXYZINE HYDROCHLORIDE 100 MG: 50 TABLET, FILM COATED ORAL at 19:13

## 2023-08-24 RX ADMIN — Medication 6 MG: at 21:20

## 2023-08-24 RX ADMIN — SENNOSIDES AND DOCUSATE SODIUM 2 TABLET: 50; 8.6 TABLET ORAL at 09:48

## 2023-08-24 RX ADMIN — SENNOSIDES AND DOCUSATE SODIUM 2 TABLET: 50; 8.6 TABLET ORAL at 17:46

## 2023-08-24 NOTE — PROGRESS NOTES
08/24/23 0823   Team Meeting   Meeting Type Daily Rounds   Team Members Present   Team Members Present Physician;;Nurse   Physician Team Member [id number] [facility] Team Member Julien   Patient/Family Present   Patient Present No   Patient's Family Present No   Pt with no issues. Pt not content with discharge. Med/meal compliant. DC Tuesday.

## 2023-08-24 NOTE — NURSING NOTE
Patient pacing and visibly anxious. Stated" I feel like I'm having really bad anxiety" Requested prn for anxiety atarax 100 mg given.

## 2023-08-24 NOTE — PLAN OF CARE
Problem: Alteration in Thoughts and Perception  Goal: Verbalize thoughts and feelings  Description: Interventions:  - Promote a nonjudgmental and trusting relationship with the patient through active listening and therapeutic communication  - Assess patient's level of functioning, behavior and potential for risk  - Engage patient in 1 on 1 interactions  - Encourage patient to express fears, feelings, frustrations, and discuss symptoms    - [place] patient to reality, help patient recognize reality-based thinking   - Administer medications as ordered and assess for potential side effects  - Provide the patient education related to the signs and symptoms of the illness and desired effects of prescribed medications  Outcome: Progressing  Goal: Refrain from acting on delusional thinking/internal stimuli  Description: Interventions:  - Monitor patient closely, per order   - Utilize least restrictive measures   - Set reasonable limits, give positive feedback for acceptable   - Administer medications as ordered and monitor of potential side effects  Outcome: Progressing  Goal: Agree to be compliant with medication regime, as prescribed and report medication side effects  Description: Interventions:  - Offer appropriate PRN medication and supervise ingestion; conduct AIMS, as needed   Outcome: Progressing  Goal: Attend and participate in unit activities, including therapeutic, recreational, and educational groups  Description: Interventions:  -Encourage Visitation and family involvement in care  Outcome: Progressing     Problem: Ineffective Coping  Goal: Participates in unit activities  Description: Interventions:  - Provide therapeutic environment   - Provide required programming   - Redirect inappropriate behaviors   Outcome: Progressing     Problem: MAGEN  Goal: Will exhibit normal sleep and speech and no impulsivity  Description: INTERVENTIONS:  - Administer medication as ordered  - Set limits on impulsive behavior  - Make attempts to decrease external stimuli as possible  Outcome: Progressing     Problem: PSYCHOSIS  Goal: Will report no hallucinations or delusions  Description: Interventions:  - Administer medication as  ordered  - Every waking shifts and PRN assess for the presence of hallucinations and or delusions  - Assist with reality testing to support increasing orientation  - Assess if patient's hallucinations or delusions are encouraging self-harm or harm to others and intervene as appropriate  Outcome: Progressing     Problem: INVOLUNTARY ADMIT  Goal: Will cooperate with staff recommendations and doctor's orders and will demonstrate appropriate behavior  Description: INTERVENTIONS:  - Treat underlying conditions and offer medication as ordered  - Educate regarding involuntary admission procedures and rules  - Utilize positive consistent limit setting strategies to support patient and staff safety  Outcome: Progressing     Problem: SLEEP DISTURBANCE  Goal: Will exhibit normal sleeping pattern  Description: Interventions:  -  Assess the patients sleep pattern, noting recent changes  - Administer medication as ordered  - Decrease environmental stimuli, including noise, as appropriate during the night  - Encourage the patient to actively participate in unit groups and or exercise during the day to enhance ability to achieve adequate sleep at night  - Assess the patient, in the morning, encouraging a description of sleep experience  Outcome: Progressing     Problem: Nutrition/Hydration-ADULT  Goal: Nutrient/Hydration intake appropriate for improving, restoring or maintaining nutritional needs  Description: Monitor and assess patient's nutrition/hydration status for malnutrition. Collaborate with interdisciplinary team and initiate plan and interventions as ordered. Monitor patient's weight and dietary intake as ordered or per policy. Utilize nutrition screening tool and intervene as necessary.  Determine patient's food preferences and provide high-protein, high-caloric foods as appropriate.      INTERVENTIONS:  - Monitor oral intake, urinary output, labs, and treatment plans  - Assess nutrition and hydration status and recommend course of action  - Evaluate amount of meals eaten  - Assist patient with eating if necessary   - Allow adequate time for meals  - Recommend/ encourage appropriate diets, oral nutritional supplements, and vitamin/mineral supplements  - Order, calculate, and assess calorie counts as needed  - Recommend, monitor, and adjust tube feedings and TPN/PPN based on assessed needs  - Assess need for intravenous fluids  - Provide specific nutrition/hydration education as appropriate  - Include patient/family/caregiver in decisions related to nutrition  Outcome: Progressing

## 2023-08-24 NOTE — TREATMENT TEAM
08/24/23 0902   Activity/Group Checklist   Group Community meeting   Attendance Attended   Attendance Duration (min) 16-30   Interactions Interacted appropriately   Affect/Mood Appropriate   Goals Achieved Able to listen to others; Able to engage in interactions; Able to self-disclose; Able to recieve feedback

## 2023-08-24 NOTE — NURSING NOTE
About the unit. Attending group. Interacting with peers. No behavioral issues. Remains less disorganized.

## 2023-08-24 NOTE — PROGRESS NOTES
08/24/23 1000   Activity/Group Checklist   Group Other (Comment)  (Group Art Therapy/Psychodynamic, Creatively Explore Thoughts vs. Statements with Discussion)   Attendance Attended   Attendance Duration (min) Greater than 60   Interactions Interacted appropriately   Affect/Mood Appropriate   Goals Achieved Discussed coping strategies; Able to listen to others; Able to engage in interactions; Able to recieve feedback; Able to give feedback to another  (Able to engage materials and directive; full participation)

## 2023-08-24 NOTE — NURSING NOTE
Patient noted in day room socializing with peers. Patient enjoys to walk the unit offers no pain or discomfort at this time. Denies SI/HI/AH/VH.

## 2023-08-24 NOTE — PROGRESS NOTES
Progress Note - Behavioral Health   Asif Love 28 y.o. female MRN: [id number]  Unit/Bed#: Cristiana Walsh 649-23 Encounter: [id number]    Assessment/Plan   Principal Problem:    Psychosis ([address]) rule out schizophrenia versus schizoaffective disorder  Active Problems:    Medical clearance for psychiatric admission    Mild tetrahydrocannabinol (THC) abuse      Recommended Treatment:   Continue Zyprexa Zydis 25 mg nightly for psychosis, mouth checks  Continue Senokot S 2 tablets twice daily for bowel regimen  Continue melatonin 6 mg nightly for insomnia  Continue vitamin D2 50,000 IU for supplementation    Continue with group therapy, milieu therapy and occupational therapy. Continue frequent safety checks and vitals per unit protocol. Case discussed with treatment team.  Risks, benefits and possible side effects of Medications: Risks, benefits, and possible side effects of medications have been explained to the patient, who verbalizes understanding    Current Legal Status: 303  Disposition: TBD, coordinating with   ------------------------------------------------------------    Subjective: Per nursing report, Manasa Ashby has been cooperative on the unit and  compliant with scheduled medications. She asked nursing staff for the number of a  and reports frustration with discharge plan. Today, Manasa Ashby was seen and evaluated for conservative care. On evaluation, patient is less disorganized, more linear and less circumstantial. She is more reactive in affect, and smiles appropriately at times She reports feeling "not so good" this morning due to frustration regarding discharge planning. Patient reports she wants to be independent and not have to go back with her family to [place], however understands she has been evicted from previous apartment. She reports speaking with niece who is planning to help upon discharge. Patient reports "little anxiety" regarding discharge plans.   She reports she would like to get in touch with her ex boyfriend of 5 years as she feels he put her in this position due to having taken over her [employer] business. Patient reports niece and boyfriend spoke yesterday but she is unable to provide further details. Patient states she slept 8 hours overnight with 2 awakenings without difficulty falling back asleep. She reports adequate appetite for breakfast this morning. Prior to interview, patient was noted to be listening to music with headphones and out in the milieu. Patient reports listening to music helps with her anxiety and improving her energy levels. She reports she was listening to a favorite song "dead or alive". Patient denies SI/HI/AVH and reports tolerating current medication regimen well. She denies constipation today and reports a soft BM today. She reports plans to hydrate and attend groups later today. Progress Toward Goals: slow improvement    Psychiatric Review of Systems:  Behavior over the last 24 hours: improving slowly  Sleep: frequent awakenings and adequate  Appetite: adequate  Medication side effects: none verbalized  ROS: Complete review of systems is negative except as noted above.     Vital signs in last 24 hours:   Temp:  [97.5 °F (36.4 °C)] 97.5 °F (36.4 °C)  HR:  [94] 94  Resp:  [20] 20  BP: (108)/(59) 108/59    Mental Status Exam:  Appearance:  alert, good eye contact, appears stated age, marginal grooming/hygiene and dressed in hospital attire, no acute distress   Behavior:  calm, cooperative and sitting comfortably   Motor: no abnormal movements and normal gait and balance   Speech:  spontaneous, clear, normal rate, normal volume and coherent   Mood:  "not so good"   Affect:  brighter, more reactive, smiling appropriately at times   Thought Process:  less disorganized and more linear, less circumstantial   Thought Content: mild paranoia, negative thoughts   Perceptual disturbances: no reported hallucinations and does not appear to be responding to internal stimuli at this time   Risk Potential: No active or passive suicidal or homicidal ideation was verbalized during interview   Cognition: oriented to self and situation, appears to be of average intelligence and cognition not formally tested   Insight:  Limited   Judgment: Limited     Current Medications:  Current Facility-Administered Medications   Medication Dose Route Frequency Provider Last Rate   • acetaminophen  650 mg Oral Q6H PRN Lashonda Smiley MD     • acetaminophen  650 mg Oral Q4H PRN Lashonda Smiley MD     • acetaminophen  975 mg Oral Q6H PRN Lashonda Smiley MD     • aluminum-magnesium hydroxide-simethicone  30 mL Oral Q4H PRN Lashonda Smiley MD     • haloperidol lactate  2.5 mg Intramuscular Q4H PRN Max 4/day Lashonda Smiley MD      And   • LORazepam  1 mg Intramuscular Q4H PRN Max 4/day Lashonda Smiley MD      And   • benztropine  0.5 mg Intramuscular Q4H PRN Max 4/day Lashonda Smiley MD     • haloperidol lactate  5 mg Intramuscular Q4H PRN Max 4/day Lashonda Smiley MD      And   • LORazepam  2 mg Intramuscular Q4H PRN Max 4/day Lashonda Smiley MD      And   • benztropine  1 mg Intramuscular Q4H PRN Max 4/day Lashonda Smiley MD     • benztropine  1 mg Oral Q4H PRN Max 6/day Lashonda Smiley MD     • bisacodyl  10 mg Rectal Daily PRN Lashonda Smiley MD     • hydrOXYzine HCL  50 mg Oral Q6H PRN Max 4/day Lashonda Smiley MD      Or   • diphenhydrAMINE  50 mg Intramuscular Q6H PRN Lashonda Smiley MD     • ergocalciferol  50,000 Units Oral Weekly MARY Arzola     • haloperidol  1 mg Oral Q6H PRN Lashonda Smiley MD     • haloperidol  2.5 mg Oral Q4H PRN Max 4/day Lashonda Smiley MD     • haloperidol  5 mg Oral Q4H PRN Max 4/day Lashonda Smiley MD     • hydrOXYzine HCL  100 mg Oral Q6H PRN Max 4/day Lashonda Smiley MD      Or   • LORazepam  2 mg Intramuscular Q6H PRN Lashonda Smiley MD     • hydrOXYzine HCL  25 mg Oral Q6H PRN Max 4/day Lashonda Smiley MD     • melatonin  6 mg Oral HS Clemencia Art DO     • OLANZapine  25 mg Oral HS Clemencia Art DO     • polyethylene glycol  17 g Oral Daily PRN Ramon Aguilar MD     • senna-docusate sodium  1 tablet Oral Daily PRN Ramon Aguilar MD     • senna-docusate sodium  2 tablet Oral BID Ganesh Sprague MD     • traZODone  100 mg Oral HS PRN Georgia C Holter, DO         Behavioral Health Medications: all current active meds have been reviewed. Changes as in plan section above. Laboratory results:  I have personally reviewed all pertinent laboratory/tests results. No results found for this or any previous visit (from the past 48 hour(s)).      Muriel Lyles DO

## 2023-08-25 PROCEDURE — 99232 SBSQ HOSP IP/OBS MODERATE 35: CPT | Performed by: PSYCHIATRY & NEUROLOGY

## 2023-08-25 RX ADMIN — OLANZAPINE 25 MG: 10 TABLET, ORALLY DISINTEGRATING ORAL at 21:14

## 2023-08-25 RX ADMIN — SENNOSIDES AND DOCUSATE SODIUM 2 TABLET: 50; 8.6 TABLET ORAL at 17:47

## 2023-08-25 RX ADMIN — SENNOSIDES AND DOCUSATE SODIUM 2 TABLET: 50; 8.6 TABLET ORAL at 10:25

## 2023-08-25 RX ADMIN — Medication 6 MG: at 21:14

## 2023-08-25 NOTE — PROGRESS NOTES
08/25/23 0861   Team Meeting   Meeting Type Daily Rounds   Team Members Present   Team Members Present Physician;Nurse;   Physician Team Member [id number] [facility] Julien   Patient/Family Present   Patient Present No   Patient's Family Present No     Pt social, denies all, but reports she had a bad day. PRN atarax for anxiety-effective. DC possible Tuesday.

## 2023-08-25 NOTE — PROGRESS NOTES
Progress Note - Behavioral Health   Shobha Singh 28 y.o. female MRN: [id number]  Unit/Bed#: Emily Bhakta 040-07 Encounter: [id number]    Assessment/Plan   Principal Problem:    Psychosis ([address]) rule out schizophrenia versus schizoaffective disorder  Active Problems:    Medical clearance for psychiatric admission    Mild tetrahydrocannabinol (THC) abuse      Recommended Treatment:   Continue Zyprexa Zydis 25 mg nightly for psychosis, mouth checks  Continue Senokot S2 tablets twice daily for bowel regimen  Continue melatonin 6 mg nightly for insomnia  Continue vitamin D2 50,000 IU for supplementation    Continue with group therapy, milieu therapy and occupational therapy. Continue frequent safety checks and vitals per unit protocol. Case discussed with treatment team.  Risks, benefits and possible side effects of Medications: Risks, benefits, and possible side effects of medications have been explained to the patient, who verbalizes understanding    Current Legal Status: 303  Disposition: Coordinating with CM  ------------------------------------------------------------    Subjective: Per nursing report, Cheri Ye has been cooperative on the unit and compliant with scheduled medications. Yesterday night, patient stated "I feel like I am having really bad anxiety" and received Atarax 100 mg p.o. she remains less disorganized during interactions this morning and without any behavioral issues. Today, Cheri Ye was seen and evaluated for continuity of care. On evaluation, patient is less disorganized and more linear thought process, cooperative. She reports feeling " alright" this morning due to frustrations regarding her apartment and wanting more brown rice for meals. Patient states she slept "well", estimating 8-9 her sleep overnight. She describes her energy levels as "70%" because it is still early in the morning and she anticipates her energy level is improving as the day progresses.   Patient reports she has not had breakfast yet but is awaiting it and is hungry. She reports feeling "a little bit" of constipation today. She reports she had a bowel movement yesterday. Patient reports she will attend groups later in the day. She is tolerating current medication regimen well. Patient denies SI/HI/AVH. Progress Toward Goals: slow improvement    Psychiatric Review of Systems:  Behavior over the last 24 hours: improving slowly  Sleep: adequate  Appetite: adequate  Medication side effects: some constipation, last BM yesterday  ROS: Complete review of systems is negative except as noted above.     Vital signs in last 24 hours:   Temp:  [97 °F (36.1 °C)-97.6 °F (36.4 °C)] 97 °F (36.1 °C)  HR:  [83-87] 87  Resp:  [16] 16  BP: (108-112)/(58-59) 108/59    Mental Status Exam:  Appearance:  alert, good eye contact, appears stated age, marginal grooming/hygiene and dressed in hospital attire   Behavior:  calm, cooperative and sitting comfortably   Motor: no abnormal movements and normal gait and balance   Speech:  spontaneous, clear, normal rate, normal volume and coherent   Mood:  "alright"   Affect:  constricted but reactive   Thought Process:  Less disorganized, more linear   Thought Content: negative thoughts   Perceptual disturbances: no reported hallucinations and does not appear to be responding to internal stimuli at this time   Risk Potential: No active or passive suicidal or homicidal ideation was verbalized during interview   Cognition: oriented to self and situation, appears to be of average intelligence and cognition not formally tested   Insight:  Limited   Judgment: Limited     Current Medications:  Current Facility-Administered Medications   Medication Dose Route Frequency Provider Last Rate   • acetaminophen  650 mg Oral Q6H PRN Angelica Starr MD     • acetaminophen  650 mg Oral Q4H PRN Angelica Starr MD     • acetaminophen  975 mg Oral Q6H PRN Angelica Starr MD     • aluminum-magnesium hydroxide-simethicone 30 mL Oral Q4H PRN Gloria Carter MD     • haloperidol lactate  2.5 mg Intramuscular Q4H PRN Max 4/day Gloria Carter MD      And   • LORazepam  1 mg Intramuscular Q4H PRN Max 4/day Gloria Carter MD      And   • benztropine  0.5 mg Intramuscular Q4H PRN Max 4/day Gloria Carter MD     • haloperidol lactate  5 mg Intramuscular Q4H PRN Max 4/day Gloria Carter MD      And   • LORazepam  2 mg Intramuscular Q4H PRN Max 4/day Gloria Carter MD      And   • benztropine  1 mg Intramuscular Q4H PRN Max 4/day Gloria Carter MD     • benztropine  1 mg Oral Q4H PRN Max 6/day Gloria Carter MD     • bisacodyl  10 mg Rectal Daily PRN Gloria Carter MD     • hydrOXYzine HCL  50 mg Oral Q6H PRN Max 4/day Gloria Carter MD      Or   • diphenhydrAMINE  50 mg Intramuscular Q6H PRN Gloria Carter MD     • ergocalciferol  50,000 Units Oral Weekly MARY Arzola     • haloperidol  1 mg Oral Q6H PRN Gloria Carter MD     • haloperidol  2.5 mg Oral Q4H PRN Max 4/day Gloria Carter MD     • haloperidol  5 mg Oral Q4H PRN Max 4/day Gloria Carter MD     • hydrOXYzine HCL  100 mg Oral Q6H PRN Max 4/day Gloria Carter MD      Or   • LORazepam  2 mg Intramuscular Q6H PRN Gloria Carter MD     • hydrOXYzine HCL  25 mg Oral Q6H PRN Max 4/day Gloria Carter MD     • melatonin  6 mg Oral HS Clemencia Art, DO     • OLANZapine  25 mg Oral HS Clemencia Rubens, DO     • polyethylene glycol  17 g Oral Daily PRN Gloria Carter MD     • senna-docusate sodium  1 tablet Oral Daily PRN Gloria Carter MD     • senna-docusate sodium  2 tablet Oral BID Sary Shabazz MD     • traZODone  100 mg Oral HS PRN Georgia C Holter, DO         Behavioral Health Medications: all current active meds have been reviewed. Changes as in plan section above. Laboratory results:  I have personally reviewed all pertinent laboratory/tests results.   No results found for this or any previous visit (from the past 48 hour(s)).      Alonso Barriga, DO

## 2023-08-25 NOTE — PLAN OF CARE
Problem: Alteration in Thoughts and Perception  Goal: Verbalize thoughts and feelings  Description: Interventions:  - Promote a nonjudgmental and trusting relationship with the patient through active listening and therapeutic communication  - Assess patient's level of functioning, behavior and potential for risk  - Engage patient in 1 on 1 interactions  - Encourage patient to express fears, feelings, frustrations, and discuss symptoms    - [place] patient to reality, help patient recognize reality-based thinking   - Administer medications as ordered and assess for potential side effects  - Provide the patient education related to the signs and symptoms of the illness and desired effects of prescribed medications  Outcome: Progressing  Goal: Refrain from acting on delusional thinking/internal stimuli  Description: Interventions:  - Monitor patient closely, per order   - Utilize least restrictive measures   - Set reasonable limits, give positive feedback for acceptable   - Administer medications as ordered and monitor of potential side effects  Outcome: Progressing  Goal: Agree to be compliant with medication regime, as prescribed and report medication side effects  Description: Interventions:  - Offer appropriate PRN medication and supervise ingestion; conduct AIMS, as needed   Outcome: Progressing  Goal: Attend and participate in unit activities, including therapeutic, recreational, and educational groups  Description: Interventions:  -Encourage Visitation and family involvement in care  Outcome: Progressing     Problem: MAGEN  Goal: Will exhibit normal sleep and speech and no impulsivity  Description: INTERVENTIONS:  - Administer medication as ordered  - Set limits on impulsive behavior  - Make attempts to decrease external stimuli as possible  Outcome: Progressing     Problem: PSYCHOSIS  Goal: Will report no hallucinations or delusions  Description: Interventions:  - Administer medication as  ordered  - Every waking shifts and PRN assess for the presence of hallucinations and or delusions  - Assist with reality testing to support increasing orientation  - Assess if patient's hallucinations or delusions are encouraging self-harm or harm to others and intervene as appropriate  Outcome: Progressing     Problem: INVOLUNTARY ADMIT  Goal: Will cooperate with staff recommendations and doctor's orders and will demonstrate appropriate behavior  Description: INTERVENTIONS:  - Treat underlying conditions and offer medication as ordered  - Educate regarding involuntary admission procedures and rules  - Utilize positive consistent limit setting strategies to support patient and staff safety  Outcome: Progressing     Problem: Nutrition/Hydration-ADULT  Goal: Nutrient/Hydration intake appropriate for improving, restoring or maintaining nutritional needs  Description: Monitor and assess patient's nutrition/hydration status for malnutrition. Collaborate with interdisciplinary team and initiate plan and interventions as ordered. Monitor patient's weight and dietary intake as ordered or per policy. Utilize nutrition screening tool and intervene as necessary. Determine patient's food preferences and provide high-protein, high-caloric foods as appropriate.      INTERVENTIONS:  - Monitor oral intake, urinary output, labs, and treatment plans  - Assess nutrition and hydration status and recommend course of action  - Evaluate amount of meals eaten  - Assist patient with eating if necessary   - Allow adequate time for meals  - Recommend/ encourage appropriate diets, oral nutritional supplements, and vitamin/mineral supplements  - Order, calculate, and assess calorie counts as needed  - Recommend, monitor, and adjust tube feedings and TPN/PPN based on assessed needs  - Assess need for intravenous fluids  - Provide specific nutrition/hydration education as appropriate  - Include patient/family/caregiver in decisions related to nutrition  Outcome: Progressing

## 2023-08-25 NOTE — NURSING NOTE
Pt is calm without agitation denying depression, anxiety, SI/HI/AVH. Affect is constricted. . Visible on the milieu without agitation.

## 2023-08-25 NOTE — NURSING NOTE
Reassessed patient currently has no visible symptoms of anxiety , currently resting in bed at this time. Prn atarax effective.

## 2023-08-25 NOTE — PROGRESS NOTES
08/25/23 1000   Activity/Group Checklist   Group Other (Comment)  (Group Art Therapy/Psychodynamic, [place] with Discussion)   Attendance Attended   Attendance Duration (min) Greater than 60   Interactions Interacted appropriately   Affect/Mood Appropriate   Goals Achieved Discussed coping strategies; Able to listen to others; Able to engage in interactions; Able to recieve feedback; Able to give feedback to another  (Able to engage materials; full participation)

## 2023-08-25 NOTE — NURSING NOTE
About the unit. No behavioral issues. No complaints. Remains less disorganized during interaction. Denies symptoms. Hopeful for discharge soon.

## 2023-08-26 PROCEDURE — 99232 SBSQ HOSP IP/OBS MODERATE 35: CPT | Performed by: STUDENT IN AN ORGANIZED HEALTH CARE EDUCATION/TRAINING PROGRAM

## 2023-08-26 RX ADMIN — SENNOSIDES AND DOCUSATE SODIUM 2 TABLET: 50; 8.6 TABLET ORAL at 09:01

## 2023-08-26 RX ADMIN — Medication 6 MG: at 21:28

## 2023-08-26 RX ADMIN — OLANZAPINE 25 MG: 10 TABLET, ORALLY DISINTEGRATING ORAL at 21:28

## 2023-08-26 NOTE — PLAN OF CARE
Problem: DISCHARGE PLANNING  Goal: Discharge to home or other facility with appropriate resources  Description: INTERVENTIONS:  - Identify barriers to discharge w/patient and caregiver  - Arrange for needed discharge resources and transportation as appropriate  - Identify discharge learning needs (meds, wound care, etc.)  - Arrange for interpretive services to assist at discharge as needed  - Refer to Case Management Department for coordinating discharge planning if the patient needs post-hospital services based on physician/advanced practitioner order or complex needs related to functional status, cognitive ability, or social support system  Outcome: Progressing     Problem: Alteration in Thoughts and Perception  Goal: Verbalize thoughts and feelings  Description: Interventions:  - Promote a nonjudgmental and trusting relationship with the patient through active listening and therapeutic communication  - Assess patient's level of functioning, behavior and potential for risk  - Engage patient in 1 on 1 interactions  - Encourage patient to express fears, feelings, frustrations, and discuss symptoms    - [place] patient to reality, help patient recognize reality-based thinking   - Administer medications as ordered and assess for potential side effects  - Provide the patient education related to the signs and symptoms of the illness and desired effects of prescribed medications  Outcome: Progressing  Goal: Refrain from acting on delusional thinking/internal stimuli  Description: Interventions:  - Monitor patient closely, per order   - Utilize least restrictive measures   - Set reasonable limits, give positive feedback for acceptable   - Administer medications as ordered and monitor of potential side effects  Outcome: Progressing  Goal: Agree to be compliant with medication regime, as prescribed and report medication side effects  Description: Interventions:  - Offer appropriate PRN medication and supervise ingestion; conduct AIMS, as needed   Outcome: Progressing  Goal: Attend and participate in unit activities, including therapeutic, recreational, and educational groups  Description: Interventions:  -Encourage Visitation and family involvement in care  Outcome: Progressing     Problem: Ineffective Coping  Goal: Participates in unit activities  Description: Interventions:  - Provide therapeutic environment   - Provide required programming   - Redirect inappropriate behaviors   Outcome: Progressing     Problem: PSYCHOSIS  Goal: Will report no hallucinations or delusions  Description: Interventions:  - Administer medication as  ordered  - Every waking shifts and PRN assess for the presence of hallucinations and or delusions  - Assist with reality testing to support increasing orientation  - Assess if patient's hallucinations or delusions are encouraging self-harm or harm to others and intervene as appropriate  Outcome: Progressing     Problem: SLEEP DISTURBANCE  Goal: Will exhibit normal sleeping pattern  Description: Interventions:  -  Assess the patients sleep pattern, noting recent changes  - Administer medication as ordered  - Decrease environmental stimuli, including noise, as appropriate during the night  - Encourage the patient to actively participate in unit groups and or exercise during the day to enhance ability to achieve adequate sleep at night  - Assess the patient, in the morning, encouraging a description of sleep experience  Outcome: Progressing     Problem: INVOLUNTARY ADMIT  Goal: Will cooperate with staff recommendations and doctor's orders and will demonstrate appropriate behavior  Description: INTERVENTIONS:  - Treat underlying conditions and offer medication as ordered  - Educate regarding involuntary admission procedures and rules  - Utilize positive consistent limit setting strategies to support patient and staff safety  Outcome: Progressing     Problem: Nutrition/Hydration-ADULT  Goal: Nutrient/Hydration intake appropriate for improving, restoring or maintaining nutritional needs  Description: Monitor and assess patient's nutrition/hydration status for malnutrition. Collaborate with interdisciplinary team and initiate plan and interventions as ordered. Monitor patient's weight and dietary intake as ordered or per policy. Utilize nutrition screening tool and intervene as necessary. Determine patient's food preferences and provide high-protein, high-caloric foods as appropriate.      INTERVENTIONS:  - Monitor oral intake, urinary output, labs, and treatment plans  - Assess nutrition and hydration status and recommend course of action  - Evaluate amount of meals eaten  - Assist patient with eating if necessary   - Allow adequate time for meals  - Recommend/ encourage appropriate diets, oral nutritional supplements, and vitamin/mineral supplements  - Order, calculate, and assess calorie counts as needed  - Recommend, monitor, and adjust tube feedings and TPN/PPN based on assessed needs  - Assess need for intravenous fluids  - Provide specific nutrition/hydration education as appropriate  - Include patient/family/caregiver in decisions related to nutrition  Outcome: Progressing

## 2023-08-26 NOTE — NURSING NOTE
Pt refusing evening dose of Senna. Pt states that her constipation has resolved and she no longer requires medications "to go." Education provided.
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
## 2023-08-26 NOTE — NURSING NOTE
Pt has been pleasant and cooperative throughout the day. She is intermittently visible on the unit. She denies any issues with sleep or appetite. She is med and meal compliant. Pt required extensive encouragement for compliance with medication. She denies any SI/HI/AH/VH. Staff availability reinforced.

## 2023-08-27 PROCEDURE — 99232 SBSQ HOSP IP/OBS MODERATE 35: CPT | Performed by: STUDENT IN AN ORGANIZED HEALTH CARE EDUCATION/TRAINING PROGRAM

## 2023-08-27 RX ORDER — AMOXICILLIN 250 MG
2 CAPSULE ORAL 2 TIMES DAILY PRN
Status: DISCONTINUED | OUTPATIENT
Start: 2023-08-27 | End: 2023-08-29 | Stop reason: HOSPADM

## 2023-08-27 RX ADMIN — Medication 6 MG: at 21:17

## 2023-08-27 RX ADMIN — OLANZAPINE 25 MG: 10 TABLET, ORALLY DISINTEGRATING ORAL at 21:17

## 2023-08-27 NOTE — PLAN OF CARE
Problem: DISCHARGE PLANNING  Goal: Discharge to home or other facility with appropriate resources  Description: INTERVENTIONS:  - Identify barriers to discharge w/patient and caregiver  - Arrange for needed discharge resources and transportation as appropriate  - Identify discharge learning needs (meds, wound care, etc.)  - Arrange for interpretive services to assist at discharge as needed  - Refer to Case Management Department for coordinating discharge planning if the patient needs post-hospital services based on physician/advanced practitioner order or complex needs related to functional status, cognitive ability, or social support system  Outcome: Progressing     Problem: Alteration in Thoughts and Perception  Goal: Verbalize thoughts and feelings  Description: Interventions:  - Promote a nonjudgmental and trusting relationship with the patient through active listening and therapeutic communication  - Assess patient's level of functioning, behavior and potential for risk  - Engage patient in 1 on 1 interactions  - Encourage patient to express fears, feelings, frustrations, and discuss symptoms    - [place] patient to reality, help patient recognize reality-based thinking   - Administer medications as ordered and assess for potential side effects  - Provide the patient education related to the signs and symptoms of the illness and desired effects of prescribed medications  Outcome: Progressing  Goal: Refrain from acting on delusional thinking/internal stimuli  Description: Interventions:  - Monitor patient closely, per order   - Utilize least restrictive measures   - Set reasonable limits, give positive feedback for acceptable   - Administer medications as ordered and monitor of potential side effects  Outcome: Progressing  Goal: Agree to be compliant with medication regime, as prescribed and report medication side effects  Description: Interventions:  - Offer appropriate PRN medication and supervise ingestion; Pt c/o left shoulder pain that radiates down to left ribs. Denies CP/SOB. Pt denies recent cough. Pt denies injury. conduct AIMS, as needed   Outcome: Progressing  Goal: Attend and participate in unit activities, including therapeutic, recreational, and educational groups  Description: Interventions:  -Encourage Visitation and family involvement in care  Outcome: Progressing     Problem: Ineffective Coping  Goal: Participates in unit activities  Description: Interventions:  - Provide therapeutic environment   - Provide required programming   - Redirect inappropriate behaviors   Outcome: Progressing     Problem: MAGEN  Goal: Will exhibit normal sleep and speech and no impulsivity  Description: INTERVENTIONS:  - Administer medication as ordered  - Set limits on impulsive behavior  - Make attempts to decrease external stimuli as possible  Outcome: Progressing     Problem: PSYCHOSIS  Goal: Will report no hallucinations or delusions  Description: Interventions:  - Administer medication as  ordered  - Every waking shifts and PRN assess for the presence of hallucinations and or delusions  - Assist with reality testing to support increasing orientation  - Assess if patient's hallucinations or delusions are encouraging self-harm or harm to others and intervene as appropriate  Outcome: Progressing     Problem: SLEEP DISTURBANCE  Goal: Will exhibit normal sleeping pattern  Description: Interventions:  -  Assess the patients sleep pattern, noting recent changes  - Administer medication as ordered  - Decrease environmental stimuli, including noise, as appropriate during the night  - Encourage the patient to actively participate in unit groups and or exercise during the day to enhance ability to achieve adequate sleep at night  - Assess the patient, in the morning, encouraging a description of sleep experience  Outcome: Progressing     Problem: INVOLUNTARY ADMIT  Goal: Will cooperate with staff recommendations and doctor's orders and will demonstrate appropriate behavior  Description: INTERVENTIONS:  - Treat underlying conditions and offer medication as ordered  - Educate regarding involuntary admission procedures and rules  - Utilize positive consistent limit setting strategies to support patient and staff safety  Outcome: Progressing     Problem: Nutrition/Hydration-ADULT  Goal: Nutrient/Hydration intake appropriate for improving, restoring or maintaining nutritional needs  Description: Monitor and assess patient's nutrition/hydration status for malnutrition. Collaborate with interdisciplinary team and initiate plan and interventions as ordered. Monitor patient's weight and dietary intake as ordered or per policy. Utilize nutrition screening tool and intervene as necessary. Determine patient's food preferences and provide high-protein, high-caloric foods as appropriate.      INTERVENTIONS:  - Monitor oral intake, urinary output, labs, and treatment plans  - Assess nutrition and hydration status and recommend course of action  - Evaluate amount of meals eaten  - Assist patient with eating if necessary   - Allow adequate time for meals  - Recommend/ encourage appropriate diets, oral nutritional supplements, and vitamin/mineral supplements  - Order, calculate, and assess calorie counts as needed  - Recommend, monitor, and adjust tube feedings and TPN/PPN based on assessed needs  - Assess need for intravenous fluids  - Provide specific nutrition/hydration education as appropriate  - Include patient/family/caregiver in decisions related to nutrition  Outcome: Progressing

## 2023-08-27 NOTE — NURSING NOTE
Pt observed to be increasingly irritable throughout the day. She is witness pointing her middle finger at staff and stating that staff has "evil eyes." She refused AM dose of senna stating "I told you yesterday I don't need that." Medical provider contacted to change senna order to PRN. Pt does not engage in assessment questions she states "I don't want you as my nurse" and continues to walk the hallway. She is observed listening to music and intermittently social with select peers.

## 2023-08-27 NOTE — PROGRESS NOTES
Progress Note - Behavioral Health   Marleny Randa 28 y.o. female MRN: [id number]  Unit/Bed#: [facility] 875-28 Encounter: [id number]    Assessment/Plan   Principal Problem:    Psychosis ([address]) rule out schizophrenia versus schizoaffective disorder  Active Problems:    Medical clearance for psychiatric admission    Mild tetrahydrocannabinol (THC) abuse      Recommended Treatment:   Continue Zyprexa Zydis 25 mg nightly for psychosis, mouth checks  Continue Senokot 2 tablets twice daily for bowel regimen  Continue melatonin 10 mg nightly for insomnia  Continue vitamin D2 50,000 IU for supplementation    Continue with group therapy, milieu therapy and occupational therapy. Continue frequent safety checks and vitals per unit protocol. Case discussed with treatment team.  Risks, benefits and possible side effects of Medications: Risks, benefits, and possible side effects of medications have been explained to the patient, who verbalizes understanding    Current Legal Status: 303  Disposition: Coordinating w/CM  ------------------------------------------------------------    Subjective: Per nursing report, Kristy Escobar has been pleasant, intermittently visible, cooperative on the unit. She required extensive encouragement for compliance with scheduled medications, denied senokot as she felt she no longer needs it. Today, Kristy Escobar was seen and evaluated for continuity of care. On evaluation, patient is constricted but reactive in affect, cooperative and pleasant. She is perseverative but redirectable. Patient reports her mood is "anxiety" this morning with "low" energy. She reports having her period and feels it is attributing to her mood "swinging" and making her feel a little more irritable today. She reports her bowel movements are regular and had a bowel movement today. She reports tolerating current medication regimen well and denies medication side effects at this time.   She reports she is looking to take some Senokot after her meal, though feels she does not need it. Patient reports she will approach nursing if she feels she needs something for anxiety. Patient denies SI/HI/AVH. Progress Toward Goals: slow improvement    Psychiatric Review of Systems:  Behavior over the last 24 hours: unchanged  Sleep: normal  Appetite: adequate  Medication side effects: none verbalized  ROS: Complete review of systems is negative except as noted above.     Vital signs in last 24 hours:   Temp:  [97.2 °F (36.2 °C)-97.8 °F (36.6 °C)] 97.8 °F (36.6 °C)  HR:  [77-82] 82  Resp:  [16] 16  BP: (104-123)/(63-65) 104/65    Mental Status Exam:  Appearance:  alert, good eye contact, appears stated age and marginal grooming/hygiene, no acute distress   Behavior:  calm, cooperative and sitting comfortably   Motor: no abnormal movements and normal gait and balance   Speech:  spontaneous, clear, normal rate, normal volume and coherent   Mood:  ""anxiety""   Affect:  constricted and reactive   Thought Process:  logical, perseverative but able to be redirected   Thought Content: no verbalized delusions or overt paranoia   Perceptual disturbances: no reported hallucinations and does not appear to be responding to internal stimuli at this time   Risk Potential: No active or passive suicidal or homicidal ideation was verbalized during interview   Cognition: oriented to self and situation, appears to be of average intelligence and cognition not formally tested   Insight:  Limited   Judgment: Limited     Current Medications:  Current Facility-Administered Medications   Medication Dose Route Frequency Provider Last Rate   • acetaminophen  650 mg Oral Q6H PRN Jean Carlos Lange MD     • acetaminophen  650 mg Oral Q4H PRN Jean Carlos Lange MD     • acetaminophen  975 mg Oral Q6H PRN Jean Carlos Lange MD     • aluminum-magnesium hydroxide-simethicone  30 mL Oral Q4H PRN Jean Carlos Lange MD     • haloperidol lactate  2.5 mg Intramuscular Q4H PRN Max 4/day Aisha BOLAND Gm Knox MD      And   • LORazepam  1 mg Intramuscular Q4H PRN Max 4/day Samantha Méndez MD      And   • benztropine  0.5 mg Intramuscular Q4H PRN Max 4/day Samantha Méndez MD     • haloperidol lactate  5 mg Intramuscular Q4H PRN Max 4/day Prhenok Méndez MD      And   • LORazepam  2 mg Intramuscular Q4H PRN Max 4/day Prhenok Méndez MD      And   • benztropine  1 mg Intramuscular Q4H PRN Max 4/day Prhenok Méndez MD     • benztropine  1 mg Oral Q4H PRN Max 6/day Prhenok Méndez MD     • bisacodyl  10 mg Rectal Daily PRN Samantha Méndez MD     • hydrOXYzine HCL  50 mg Oral Q6H PRN Max 4/day Samantha Méndez MD      Or   • diphenhydrAMINE  50 mg Intramuscular Q6H PRN Samantha Méndez MD     • ergocalciferol  50,000 Units Oral Weekly MARY Arzola     • haloperidol  1 mg Oral Q6H PRN Samantha Méndez MD     • haloperidol  2.5 mg Oral Q4H PRN Max 4/day Samantha Méndez MD     • haloperidol  5 mg Oral Q4H PRN Max 4/day Samantha Méndez MD     • hydrOXYzine HCL  100 mg Oral Q6H PRN Max 4/day Samantha Méndez MD      Or   • LORazepam  2 mg Intramuscular Q6H PRN Samantha Méndez MD     • hydrOXYzine HCL  25 mg Oral Q6H PRN Max 4/day Prhenok Méndez MD     • melatonin  6 mg Oral HS Gama Stoddard DO     • OLANZapine  25 mg Oral HS Clemencia Art DO     • polyethylene glycol  17 g Oral Daily PRN Samantha Méndez MD     • senna-docusate sodium  2 tablet Oral BID PRN Annel De La Torre PA-C     • traZODone  100 mg Oral HS PRN Georgia C Holter, DO         Behavioral Health Medications: all current active meds have been reviewed. Changes as in plan section above. Laboratory results:  I have personally reviewed all pertinent laboratory/tests results. No results found for this or any previous visit (from the past 48 hour(s)).      Gamaangela Stoddard, DO

## 2023-08-27 NOTE — NURSING NOTE
Patient was smiling and friendly, standing in the doorway of her room. Denies all psych symptoms at this time. Medication compliant.

## 2023-08-28 PROCEDURE — 99232 SBSQ HOSP IP/OBS MODERATE 35: CPT | Performed by: PSYCHIATRY & NEUROLOGY

## 2023-08-28 RX ADMIN — HYDROXYZINE HYDROCHLORIDE 100 MG: 50 TABLET, FILM COATED ORAL at 19:26

## 2023-08-28 RX ADMIN — Medication 6 MG: at 21:25

## 2023-08-28 RX ADMIN — OLANZAPINE 25 MG: 10 TABLET, ORALLY DISINTEGRATING ORAL at 21:25

## 2023-08-28 RX ADMIN — HYDROXYZINE HYDROCHLORIDE 100 MG: 50 TABLET, FILM COATED ORAL at 13:54

## 2023-08-28 NOTE — PROGRESS NOTES
[facility] Group Note     08/28/23 8040   Activity/Group Checklist   Group Life Skills  (Anger/Anxiety/Depression Board Game)   Attendance Attended   Attendance Duration (min) 0-15  (only attended group briefly)   Interactions Did not interact   Affect/Mood Calm   Goals Achieved Able to listen to others  (benefited from social presence of the group)

## 2023-08-28 NOTE — NURSING NOTE
Patient has been awake, alert, and visible intermittently out in the milieu. Patient denies Anell [place]. The Patient reported  feeling anxious about leaving [place] and going to [place]. However Patient received Atarax for sever anxiety. Patient remain Q 7 min check   @20:26  Atarax was effective.

## 2023-08-28 NOTE — PLAN OF CARE
Problem: DISCHARGE PLANNING  Goal: Discharge to home or other facility with appropriate resources  Description: INTERVENTIONS:  - Identify barriers to discharge w/patient and caregiver  - Arrange for needed discharge resources and transportation as appropriate  - Identify discharge learning needs (meds, wound care, etc.)  - Arrange for interpretive services to assist at discharge as needed  - Refer to Case Management Department for coordinating discharge planning if the patient needs post-hospital services based on physician/advanced practitioner order or complex needs related to functional status, cognitive ability, or social support system  Outcome: Progressing     Problem: Alteration in Thoughts and Perception  Goal: Verbalize thoughts and feelings  Description: Interventions:  - Promote a nonjudgmental and trusting relationship with the patient through active listening and therapeutic communication  - Assess patient's level of functioning, behavior and potential for risk  - Engage patient in 1 on 1 interactions  - Encourage patient to express fears, feelings, frustrations, and discuss symptoms    - [place] patient to reality, help patient recognize reality-based thinking   - Administer medications as ordered and assess for potential side effects  - Provide the patient education related to the signs and symptoms of the illness and desired effects of prescribed medications  Outcome: Progressing  Goal: Refrain from acting on delusional thinking/internal stimuli  Description: Interventions:  - Monitor patient closely, per order   - Utilize least restrictive measures   - Set reasonable limits, give positive feedback for acceptable   - Administer medications as ordered and monitor of potential side effects  Outcome: Progressing  Goal: Agree to be compliant with medication regime, as prescribed and report medication side effects  Description: Interventions:  - Offer appropriate PRN medication and supervise ingestion; conduct AIMS, as needed   Outcome: Progressing  Goal: Attend and participate in unit activities, including therapeutic, recreational, and educational groups  Description: Interventions:  -Encourage Visitation and family involvement in care  Outcome: Progressing     Problem: Ineffective Coping  Goal: Participates in unit activities  Description: Interventions:  - Provide therapeutic environment   - Provide required programming   - Redirect inappropriate behaviors   Outcome: Progressing     Problem: MAGEN  Goal: Will exhibit normal sleep and speech and no impulsivity  Description: INTERVENTIONS:  - Administer medication as ordered  - Set limits on impulsive behavior  - Make attempts to decrease external stimuli as possible  Outcome: Progressing     Problem: PSYCHOSIS  Goal: Will report no hallucinations or delusions  Description: Interventions:  - Administer medication as  ordered  - Every waking shifts and PRN assess for the presence of hallucinations and or delusions  - Assist with reality testing to support increasing orientation  - Assess if patient's hallucinations or delusions are encouraging self-harm or harm to others and intervene as appropriate  Outcome: Progressing     Problem: SLEEP DISTURBANCE  Goal: Will exhibit normal sleeping pattern  Description: Interventions:  -  Assess the patients sleep pattern, noting recent changes  - Administer medication as ordered  - Decrease environmental stimuli, including noise, as appropriate during the night  - Encourage the patient to actively participate in unit groups and or exercise during the day to enhance ability to achieve adequate sleep at night  - Assess the patient, in the morning, encouraging a description of sleep experience  Outcome: Progressing     Problem: INVOLUNTARY ADMIT  Goal: Will cooperate with staff recommendations and doctor's orders and will demonstrate appropriate behavior  Description: INTERVENTIONS:  - Treat underlying conditions and offer medication as ordered  - Educate regarding involuntary admission procedures and rules  - Utilize positive consistent limit setting strategies to support patient and staff safety  Outcome: Progressing     Problem: Nutrition/Hydration-ADULT  Goal: Nutrient/Hydration intake appropriate for improving, restoring or maintaining nutritional needs  Description: Monitor and assess patient's nutrition/hydration status for malnutrition. Collaborate with interdisciplinary team and initiate plan and interventions as ordered. Monitor patient's weight and dietary intake as ordered or per policy. Utilize nutrition screening tool and intervene as necessary. Determine patient's food preferences and provide high-protein, high-caloric foods as appropriate.      INTERVENTIONS:  - Monitor oral intake, urinary output, labs, and treatment plans  - Assess nutrition and hydration status and recommend course of action  - Evaluate amount of meals eaten  - Assist patient with eating if necessary   - Allow adequate time for meals  - Recommend/ encourage appropriate diets, oral nutritional supplements, and vitamin/mineral supplements  - Order, calculate, and assess calorie counts as needed  - Recommend, monitor, and adjust tube feedings and TPN/PPN based on assessed needs  - Assess need for intravenous fluids  - Provide specific nutrition/hydration education as appropriate  - Include patient/family/caregiver in decisions related to nutrition  Outcome: Progressing

## 2023-08-28 NOTE — SOCIAL WORK
CM spoke with Manny Winkler (anderson). Dana under the impression that pt was not coming back to [place]. Manny Winkler reports she spoke with pt on Saturday and pt did not want to go back to [place] with niece and pt reported she wanted to stay in [place]. CM requested niece speak with pt today to confirm dc plans. CM to follow up.

## 2023-08-28 NOTE — PROGRESS NOTES
08/26/23 1300   Activity/Group Checklist   Group Other (Comment)  (OPEN STUDIO Art Therapy/Social Group)   Attendance Attended   Attendance Duration (min) Greater than 60   Interactions Interacted appropriately   Affect/Mood Appropriate   Goals Achieved Able to listen to others; Able to engage in interactions

## 2023-08-28 NOTE — NURSING NOTE
Patient about the unit. Preoccupied with discharge and asking when she will be leaving. Denies symptoms. Cooperative.
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
## 2023-08-28 NOTE — PROGRESS NOTES
08/28/23 0886   Team Meeting   Meeting Type Daily Rounds   Team Members Present   Team Members Present Physician;Nurse;   Physician Team Member [id number] [facility] Julien   Patient/Family Present   Patient Present No   Patient's Family Present No   Pt focused on discharge. Saturday- pleasant, cooperative, visible. Denied all. Med/meal compliant, but refused evening dose of Senna  Sunday- increasing irritable, middle finger at staff and told them they have evil eyes. Refused morning dose of Senna. Pt pleasant and cooperative this morning and reported not liking nurse yesterday. DC possible tomorrow.

## 2023-08-28 NOTE — NURSING NOTE
No further c/o anxiety. Patient is currently in bed resting and listening to music. PRN of Atarax 100 mg po effective.

## 2023-08-28 NOTE — NURSING NOTE
Patient is lying in bed, resting comfortably. RR even and unlabored. Smiling, calm and cooperative. Denies any psych symptoms at this time. Medication compliant. Friendly.

## 2023-08-28 NOTE — NURSING NOTE
Patient c/o severe anxiety related to possible discharge tomorrow. Pacing the halls. Requested PRN. Given Atarax 100 mg po for severe anxiety. Will monitor effectiveness.

## 2023-08-29 VITALS
HEIGHT: 65 IN | RESPIRATION RATE: 16 BRPM | TEMPERATURE: 97 F | OXYGEN SATURATION: 100 % | WEIGHT: 156.6 LBS | HEART RATE: 77 BPM | SYSTOLIC BLOOD PRESSURE: 96 MMHG | BODY MASS INDEX: 26.09 KG/M2 | DIASTOLIC BLOOD PRESSURE: 56 MMHG

## 2023-08-29 PROCEDURE — 99238 HOSP IP/OBS DSCHRG MGMT 30/<: CPT | Performed by: PSYCHIATRY & NEUROLOGY

## 2023-08-29 RX ORDER — OLANZAPINE 5 MG/1
TABLET ORAL
Qty: 30 TABLET | Refills: 1 | Status: ON HOLD | OUTPATIENT
Start: 2023-08-29

## 2023-08-29 RX ORDER — LANOLIN ALCOHOL/MO/W.PET/CERES
6 CREAM (GRAM) TOPICAL
Qty: 120 TABLET | Refills: 0 | Status: SHIPPED | OUTPATIENT
Start: 2023-08-29 | End: 2023-08-29 | Stop reason: SDUPTHER

## 2023-08-29 RX ORDER — OLANZAPINE 5 MG/1
TABLET ORAL
Qty: 30 TABLET | Refills: 1 | Status: SHIPPED | OUTPATIENT
Start: 2023-08-29 | End: 2023-08-29 | Stop reason: SDUPTHER

## 2023-08-29 RX ORDER — ERGOCALCIFEROL 1.25 MG/1
50000 CAPSULE ORAL WEEKLY
Qty: 5 CAPSULE | Refills: 0 | Status: ON HOLD | OUTPATIENT
Start: 2023-08-30 | End: 2023-09-28

## 2023-08-29 RX ORDER — OLANZAPINE 20 MG/1
TABLET ORAL
Qty: 30 TABLET | Refills: 1
Start: 2023-08-29 | End: 2023-08-29 | Stop reason: SDUPTHER

## 2023-08-29 RX ORDER — OLANZAPINE 20 MG/1
TABLET ORAL
Qty: 30 TABLET | Refills: 1 | Status: SHIPPED | OUTPATIENT
Start: 2023-08-29 | End: 2023-08-29 | Stop reason: SDUPTHER

## 2023-08-29 RX ORDER — OLANZAPINE 5 MG/1
TABLET ORAL
Qty: 30 TABLET | Refills: 1
Start: 2023-08-29 | End: 2023-08-29 | Stop reason: SDUPTHER

## 2023-08-29 RX ORDER — OLANZAPINE 20 MG/1
TABLET ORAL
Qty: 30 TABLET | Refills: 1 | Status: ON HOLD | OUTPATIENT
Start: 2023-08-29

## 2023-08-29 RX ORDER — ERGOCALCIFEROL 1.25 MG/1
50000 CAPSULE ORAL WEEKLY
Qty: 5 CAPSULE | Refills: 0 | Status: SHIPPED | OUTPATIENT
Start: 2023-08-30 | End: 2023-08-29 | Stop reason: SDUPTHER

## 2023-08-29 RX ORDER — LANOLIN ALCOHOL/MO/W.PET/CERES
6 CREAM (GRAM) TOPICAL
Qty: 60 TABLET | Refills: 1 | Status: ON HOLD | OUTPATIENT
Start: 2023-08-29 | End: 2023-10-28

## 2023-08-29 RX ADMIN — HYDROXYZINE HYDROCHLORIDE 100 MG: 50 TABLET, FILM COATED ORAL at 10:32

## 2023-08-29 NOTE — PROGRESS NOTES
08/29/23 0839   Team Meeting   Meeting Type Daily Rounds   Team Members Present   Team Members Present Physician;Nurse;   Physician Team Member [id number] [facility] Team Member [facility] Management Team Member Gladis   Patient/Family Present   Patient Present No   Patient's Family Present No   Pt intermittently visible. PRN Atarax which was effective. Discharge possible today.

## 2023-08-29 NOTE — NURSING NOTE
Patient becoming increasingly anxious related to discharge; pacing halls. Requesting PRN of anxiety. Given Atarax 100 mg po for severe anxiety. Will monitor effectiveness.

## 2023-08-29 NOTE — DISCHARGE SUMMARY
Discharge Summary - [address] [facility] 28 y.o. female MRN: [id number]  Unit/Bed#: [facility] [place] 887-70 Encounter: [id number]     Admission Date:   Admission Orders (From admission, onward)     Ordered        08/07/23 2301  ED TO DIFFERENT CAMPUS [facility] UNIT or INPATIENT MEDICAL UNIT to [facility] UNIT (using Discharge Readmit Navigator) - Admit Patient to [facility] Unit  Once                          Discharge Date: 08/29/23     Attending Psychiatrist: Fidel Hodge MD     Discharge Diagnosis:   Principal Problem:    Psychosis ([address] ) rule out schizophrenia versus schizoaffective disorder  Active Problems:    Medical clearance for psychiatric admission    Mild tetrahydrocannabinol (THC) abuse      Reason for Admission:   Jose Ramon Dixon is a 28 y.o. female who initially presented with poor self-care, possible manic symptoms, erratic behavior, disorganization, paranoia, and stating that she was ready to die, but she was denying suicidal ideation and homicidal ideation. Jose Ramon Dixon initially was difficult to follow due to disorganization of thought process bordering on looseness of association, laughing inappropriately. Did report paranoid thoughts and use of cannabis. Trini Sena she was admitted to the psychiatric unit on a involuntary 302 commitment. Please see initial H&P for full details by Alonso Barriga DO at 8/8/2023:  "Jose Ramon Dixon is a 28 y.o. female with no pertinent past psychiatric history who presents involuntarily on a 302 commitment with bizarre behaviors, paranoid ideation, ideas of reference and found wandering the streets. Per CW note, 302 stated when patient was found, she reportedly stated she was ready to die and advised to shoot her. Of note, patient also had multiple previous ED visits for psychotic symptoms and AMS. In the ED, patient was seen screaming at the wall and gesturing, with limited cooperation with assessment questions.   Per CW note, patient reported seeing a psychiatrist "a long time ago in [place]". Patient also reported "seeing people at times but not sure if they are trying to hurt me". Patient also says she has "a lot" of legal issues. Per CW note, patient's niece reported speaking with patient on Saturday and patient was paranoid about her phones being tapped.     Symptoms prior to admission included erratic behavior, bizarre behavior, paranoid ideation, delusional thoughts, drug abuse, poor memory and poor self-care. On evaluation, patient appears to be a limited historian and appears to have impaired recent memory due to acute psychosis and recent substance use. She had unkempt hair and marginal grooming/hygiene. She was difficult to follow and is noted to be tangential and disorganized in thought process with loose associations. She is blunted in affect and has seen inappropriately smiling/laughing during interview. Patient was also distractible during the interview and often dismissive during the assessment. She is grandiose at times stating she needs to "start a [employer] business" and later a "[employer]" and she is feeling "stronger and stronger" with each day. When asked what brought her to the ED, patient reported that "the past few days has nothing to do with why I'm here. It all started my boyfriend" and expressed paranoid ideation regarding "Slovenian people" and her boyfriend trying to work her as a "slave" in [place] in a [employer]. Patient was also guarded at times. Patient reports she does not have any family in the area. When asked where her family is, patient stated "I can't tell you that" refused to provide further information. Patient reports using 16-20 Gummies that two friends brought over to her house and states they had additional drugs in them, though she is unsure specifically what. She also endorses "vaping" prior to admission. She reports she has known these friends for about 1 month.   Per ED documentation, patient reportedly was not sleeping for several days though denied at time of interview and states she typically gets 7-8 hours of sleep. She reports an adequate appetite. Patient describes her energy as "so-so". Described her mood as "paranoid". She also reported "whenever I eat sushi I feel different". Patient denies SI/HI/AVH. Of note, when patient was asked about VH she had a smirk on her face. She denies previous psych history or previously seeing a psychiatrist.  She reports her father's best friend is a therapist who she checks in with as needed.      Please see documentation from the emergency department reproduced below for further details about initial presentation.     Per emergency department provider Norma Ramsey MD on 08/06/23:  "71-year-old female brought in by EMS after police called when they found patient wandering the streets and acting erratically.  She reports not sleeping for several days. Prema Arnett states that she is worried that people are attacking her on social media.  She reports staying with 2 men in her residence and Guillermo Hartman, does not give their names. Prema Arnett was seen at [facility] yesterday for erratic behavior, related to smoking marijuana and using mushrooms that time, denies taking any drugs since then. Vik Roberts HI/SI."     Per Crisis worker Abelino Mesa on 08/26/23:  "Pt presents to the ED via EMS from home. Pt reports having lived previously in [place], moved to [place], and resides alone in house. Pt denies any hx of MH illness. Pt denies SI's / HI's and or AVH's. Pt confirms having had some THC (smoked) and then was given gummies but unsure of what may have been in them. Pt states, "Never again I cannot do that." Pt reports good sleep and fair appetite. Pt denies any hx of MH tx and or any medical issues. Pt alleges, "Two men had stayed with me and they were the ones who gave me the marijuana and the gummies." Pt denies men are currently still at the home and pt denies any safety concerns or issues.  Pt does not appear to meet criteria for IP tx at this time. Pt is calm, cooperative, maintains good eye contact and engages in conversation. Pt does not appear to be responding to internal stimuli and or experiencing any hallucinations at this time. CW and pt did discuss IP tx vs going home. Pt appeared relieved and appreciative to be provided w/the option to return home at this time. CW provided pt w/OP referrals. Pt is thankful and again states, "I will never do that stuff anymore, that was the last time." CW provided Dr. Daphnie Munoz w/details and updates. Dr. Daphnie Munoz will d/c pt home at this time. ""      Please see initial H&P for full details by [facility]. Omar at 8/8/2023:  "Patient was seen and evaluated alongside resident physician for evaluation. Patient is a 66-year-old female with no self-reported psychiatric history and no psychiatric history per records who presents on an involuntary [address] [place] [address] inpatient commitment for poor self-care, possible manic symptoms, erratic behavior, disorganization, and paranoia. Per record review, patient was brought in by EMS after patient was found wandering the streets and acting erratically. She reported not sleeping for several days and reported worrying that people are attacking her on social media. While in the ED, patient was noted to be screaming at the wall and cursing at the wall. Per crisis worker note, patient was noted to be speaking in her native language. She did report to crisis worker that she saw a psychiatrist "a long time ago in [place]."  Per documentation by crisis worker, patient's [id number] petition reported that the patient was laying in the middle of the road and as treasure approached the patient, tuber was advised to "shoot" her and patient made comments that she was ready to die.   Per collateral information obtained by patient's family while in the ED, patient's niece reported that the patient was afraid that her phone was being tapped and that someone was chasing her. Please refer to crisis worker note on 8/7/2023 at 1:35 PM for further information. During the interview today, patient was difficult to follow due to disorganization of thought process, bordering on looseness of association. She was noted to be laughing inappropriately to herself throughout the interview. She reported paranoia about "Mosotho" people as well as her ex-boyfriend but was unable to elaborate on this. When asked about her support system in the area, patient states that she does not have family in the area but then refused to disclose where her family is currently located, stating, "I can't tell you that."  Patient states that she was using cannabis and "gum used to" with a friend with whom she resides as well as another friend who brought her to cannabis. Patient describes her mood as "paranoid."  She reportedly was not sleeping for several days prior to admission and also was noted to have per records a decreased appetite. Patient denies the symptoms during the interview and states that she regularly sleeps 7 to 8 hours at night and notes that she has been doing well with her appetite. Of note, patient was noted to be eating at the time of interview. Patient does appear to have marginal hygiene and does not appear to be taking care of her ADLs, as she was noted to be disheveled with overgrown fingernails. Patient describes her energy as "so-so."  She was noted to be perseverative about "sushi" and needing to open her own tie restaurant."  She was noted to be distractible during the interview. She denies SI/HI/AVH and denies any plan or intent to harm herself or others. She was noted to be internally preoccupied and distracted throughout the interview. Patient also on 1 occasion was noted to be smiling unprovoked during the session.   Patient does report grandiose ideas, stating that she has to open a [employer] and states that she gets "stronger and stronger" as the days progressed. She denies any motivational changes. She denies having any anxiety symptoms but then later states that she feels "anxious" as a result of her paranoid thinking. She would have instances of contradicting herself during the interview. She denies OCD symptoms. She denies trauma. She denies PTSD symptoms. She denies eating disorder. She denies seizures. She denies loss of consciousness or concussions. She denies any access to weapons or firearms."      Hospital Course: On admission, Dora Barroso was started on Zyprexa Zydis 5 mg nightly for psychoses with mouth checks. Her medications were titrated as appropriate, including increasing Zyprexa Zydis to 25 mg nightly. She was also started on melatonin 6 mg nightly and vitamin D 50,000 units weekly for 8 weeks. She tolerated these medications with no acute side effects. On day of discharge, patient was on Zyprexa Zydis 25 mg nightly and was discharged on Zyprexa tablet (1 tablet of 20 mg and 1 tablet of 5 mg 2 make a total of 25 mg to be taken every night). She was also discharged on vitamin D 50,000 units weekly with 5 doses remaining and melatonin 6 mg nightly for insomnia. The patient's mood brightened over the course of treatment, and she was seen in [facility] interacting appropriately with peers. Dora Barroso did not demonstrate dangerous behavior to self or others during her inpatient stay. On the day of discharge, Dora Barroso denied suicidal or homicidal ideations. She reported that she had anxiety centered around her discharge plans and received Atarax 100 mg at 1:55 PM, effective. Patient's anxiety was centered around her plan to go from [place] to [place]. Last night, she received Atarax for severe anxiety at 8:26 PM which was effective. Patients plan for discharge, according to case management, was to be picked up by [facility] after first going to her apartment to  her belongings.   Niece reported that she cannot come and  patient due to having 2 young children at home, so patient would have to drive herself to [place]. This was a source of patient's anxiety, and she stated that she would be able to make this drive after she goes to her apartment to  her belongings. Today patient reports 7 hours of sleep, good appetite, feeling well rested. Denies SI, HI, auditory hallucinations, or any visual hallucinations. She reports that she feels safe on the unit and regarding her trip to [place], states she will be able to handle it. She initially stated she would not be compliant with medications but then decided to remain compliant as she believes the medications have helped with her coldness, focus, and overall mood. She feels improved since arriving on the unit and continues to have mild paranoia about her ex-boyfriend stalking her by sending 2 people to her house to follow her and steal her [employer] business at [facility]. Outside of this, denies paranoid thoughts and does not feel threatened or bothered by her 1 paranoid thoughts. She understands that she will be given a 30-day supply of medications with 1 refill to be picked up at [facility] and she is scheduled for an intake with [facility] on 8/30/2023 at 10 AM.    I reviewed with Paola Welch the importance of compliance with medications and outpatient treatment after discharge. Labs/Imaging:   I have personally reviewed all pertinent laboratory/tests results.     Mental Status Exam:  Appearance:  alert, good eye contact, appears stated age, casually dressed and appropriate grooming and hygiene   Behavior:  calm, cooperative and sitting comfortably   Motor: no abnormal movements and normal gait and balance   Speech:  spontaneous, normal rate, normal volume and coherent   Mood:  "nervous but happy"   Affect:  mood-congruent and brighter than previous   Thought Process:  Organized, logical, goal-directed   Thought Content: no delusions, minimal paranoia much improved since previous days   Perceptual disturbances: no reported hallucinations and does not appear to be responding to internal stimuli at this time   Risk Potential: No active or passive suicidal or homicidal ideation was verbalized during interview   Cognition: oriented to self and situation, appears to be of average intelligence and cognition not formally tested   Insight:  Fair   Judgment: Fair     Discharge Medications:  .  See list below, as well as the after visit summary containing reconciled discharge medications provided to patient and family.       Current Facility-Administered Medications   Medication Dose Route Frequency Provider Last Rate   • acetaminophen  650 mg Oral Q6H PRN Ramon Aguilar MD     • acetaminophen  650 mg Oral Q4H PRN Ramon Aguilar MD     • acetaminophen  975 mg Oral Q6H PRN Ramon Aguilar MD     • aluminum-magnesium hydroxide-simethicone  30 mL Oral Q4H PRN Ramon Aguilar MD     • haloperidol lactate  2.5 mg Intramuscular Q4H PRN Max 4/day Ramon Aguilar MD      And   • LORazepam  1 mg Intramuscular Q4H PRN Max 4/day Ramon Aguilar MD      And   • benztropine  0.5 mg Intramuscular Q4H PRN Max 4/day Ramon Aguilar MD     • haloperidol lactate  5 mg Intramuscular Q4H PRN Max 4/day Ramon Aguilar MD      And   • LORazepam  2 mg Intramuscular Q4H PRN Max 4/day Ramon Aguilar MD      And   • benztropine  1 mg Intramuscular Q4H PRN Max 4/day Ramon Aguilar MD     • benztropine  1 mg Oral Q4H PRN Max 6/day Ramon Aguilar MD     • bisacodyl  10 mg Rectal Daily PRN Ramon Aguilar MD     • hydrOXYzine HCL  50 mg Oral Q6H PRN Max 4/day Ramon Aguilar MD      Or   • diphenhydrAMINE  50 mg Intramuscular Q6H PRN Ramon Aguilar MD     • ergocalciferol  50,000 Units Oral Weekly MARY Arzola     • haloperidol  1 mg Oral Q6H PRN Ramon Aguilar MD     • haloperidol  2.5 mg Oral Q4H PRN Max 4/day Ramon Aguilar MD     • haloperidol  5 mg Oral Q4H PRN Max 4/day Venita Nissen, MD     • hydrOXYzine HCL  100 mg Oral Q6H PRN Max 4/day Venita Nissen, MD      Or   • LORazepam  2 mg Intramuscular Q6H PRN Venita Nissen, MD     • hydrOXYzine HCL  25 mg Oral Q6H PRN Max 4/day Venita Nissen, MD     • melatonin  6 mg Oral HS Clemencia Art DO     • OLANZapine  25 mg Oral HS Clemencia Art DO     • polyethylene glycol  17 g Oral Daily PRN Venita Nissen, MD     • senna-docusate sodium  2 tablet Oral BID PRN Dale Chávez PA-C     • traZODone  100 mg Oral HS PRN Georgia C Holter, DO         Risk of Harm to Self:   The following ratings are based on assessment at the time of discharge  Demographic risk factors include: never   Historical Risk Factors include: chronic psychiatric problems, chronic anxiety symptoms, history of anxiety, chronic psychotic symptoms, history of psychosis, drug use, substance use, history of substance use, history of impulsive behaviors  Current Specific Risk Factors include: recent inpatient psychiatric admission - being discharged today, mental illness diagnosis, chronic psychotic symptoms, chronic paranoid ideation, poor impulse control, lack of support, substance use, social isolation  Protective Factors: no current suicidal ideation, improved anxiety symptoms, improved psychotic symptoms, improved impulse control, ability to make plans for the future, no current suicidal plan or intent, family support established, effective coping skills, good health, having a desire to be alive, having a desire to live, having a sense of purpose or meaning in life, personal beliefs about the meaning and value of life, restricted access to lethal means, sense of determination, sense of personal control, supportive family, ability to contract for safety with staff, ability to communicate with staff  Weapons/Firearms: none.  The following steps have been taken to ensure weapons are properly secured: not applicable  Based on today's Nora Clock presents the following risk of harm to self: minimal    Risk of Harm to Others: The following ratings are based on assessment at the time of discharge  Demographic Risk Factors include: unemployed, under age 36. Historical Risk Factors include: drug abuse, history of substance use. Current Specific Risk Factors include: multiple stressors, social difficulties  Protective Factors: no current homicidal ideation, improved impulse control, stable mood, effective coping skills, effective problem solving skills, good self-esteem, opportunities to participate in community, personal beliefs, resilience, restricted access to lethal means, sense of personal control, access to mental health treatment  Weapons/Firearms: none. The following steps have been taken to ensure weapons are properly secured: not applicable  Based on today's assessment, Cherry Love presents the following risk of harm to others: minimal      Discharge instructions/Information to patient and family:   See after visit summary for information provided to patient and family. Provisions for Follow-Up Care:  See after visit summary for information related to follow-up care and any pertinent home health orders.

## 2023-08-29 NOTE — PLAN OF CARE
Problem: DISCHARGE PLANNING  Goal: Discharge to home or other facility with appropriate resources  Description: INTERVENTIONS:  - Identify barriers to discharge w/patient and caregiver  - Arrange for needed discharge resources and transportation as appropriate  - Identify discharge learning needs (meds, wound care, etc.)  - Arrange for interpretive services to assist at discharge as needed  - Refer to Case Management Department for coordinating discharge planning if the patient needs post-hospital services based on physician/advanced practitioner order or complex needs related to functional status, cognitive ability, or social support system  Outcome: Completed     Problem: Alteration in Thoughts and Perception  Goal: Verbalize thoughts and feelings  Description: Interventions:  - Promote a nonjudgmental and trusting relationship with the patient through active listening and therapeutic communication  - Assess patient's level of functioning, behavior and potential for risk  - Engage patient in 1 on 1 interactions  - Encourage patient to express fears, feelings, frustrations, and discuss symptoms    - [place] patient to reality, help patient recognize reality-based thinking   - Administer medications as ordered and assess for potential side effects  - Provide the patient education related to the signs and symptoms of the illness and desired effects of prescribed medications  Outcome: Completed  Goal: Refrain from acting on delusional thinking/internal stimuli  Description: Interventions:  - Monitor patient closely, per order   - Utilize least restrictive measures   - Set reasonable limits, give positive feedback for acceptable   - Administer medications as ordered and monitor of potential side effects  Outcome: Completed  Goal: Agree to be compliant with medication regime, as prescribed and report medication side effects  Description: Interventions:  - Offer appropriate PRN medication and supervise ingestion; conduct AIMS, as needed   Outcome: Completed  Goal: Attend and participate in unit activities, including therapeutic, recreational, and educational groups  Description: Interventions:  -Encourage Visitation and family involvement in care  Outcome: Completed     Problem: MAGEN  Goal: Will exhibit normal sleep and speech and no impulsivity  Description: INTERVENTIONS:  - Administer medication as ordered  - Set limits on impulsive behavior  - Make attempts to decrease external stimuli as possible  Outcome: Completed     Problem: PSYCHOSIS  Goal: Will report no hallucinations or delusions  Description: Interventions:  - Administer medication as  ordered  - Every waking shifts and PRN assess for the presence of hallucinations and or delusions  - Assist with reality testing to support increasing orientation  - Assess if patient's hallucinations or delusions are encouraging self-harm or harm to others and intervene as appropriate  Outcome: Completed     Problem: SLEEP DISTURBANCE  Goal: Will exhibit normal sleeping pattern  Description: Interventions:  -  Assess the patients sleep pattern, noting recent changes  - Administer medication as ordered  - Decrease environmental stimuli, including noise, as appropriate during the night  - Encourage the patient to actively participate in unit groups and or exercise during the day to enhance ability to achieve adequate sleep at night  - Assess the patient, in the morning, encouraging a description of sleep experience  Outcome: Completed     Problem: INVOLUNTARY ADMIT  Goal: Will cooperate with staff recommendations and doctor's orders and will demonstrate appropriate behavior  Description: INTERVENTIONS:  - Treat underlying conditions and offer medication as ordered  - Educate regarding involuntary admission procedures and rules  - Utilize positive consistent limit setting strategies to support patient and staff safety  Outcome: Completed     Problem: Detail Level: Zone Nutrition/Hydration-ADULT  Goal: Nutrient/Hydration intake appropriate for improving, restoring or maintaining nutritional needs  Description: Monitor and assess patient's nutrition/hydration status for malnutrition. Collaborate with interdisciplinary team and initiate plan and interventions as ordered. Monitor patient's weight and dietary intake as ordered or per policy. Utilize nutrition screening tool and intervene as necessary. Determine patient's food preferences and provide high-protein, high-caloric foods as appropriate.      INTERVENTIONS:  - Monitor oral intake, urinary output, labs, and treatment plans  - Assess nutrition and hydration status and recommend course of action  - Evaluate amount of meals eaten  - Assist patient with eating if necessary   - Allow adequate time for meals  - Recommend/ encourage appropriate diets, oral nutritional supplements, and vitamin/mineral supplements  - Order, calculate, and assess calorie counts as needed  - Recommend, monitor, and adjust tube feedings and TPN/PPN based on assessed needs  - Assess need for intravenous fluids  - Provide specific nutrition/hydration education as appropriate  - Include patient/family/caregiver in decisions related to nutrition  Outcome: Completed     Problem: Ineffective Coping  Goal: Participates in unit activities  Description: Interventions:  - Provide therapeutic environment   - Provide required programming   - Redirect inappropriate behaviors   Outcome: Completed

## 2023-08-29 NOTE — NURSING NOTE
Outpatient appointments and medications reviewed with patient prior to discharge. Medications delivered to the unit from [address] for patient.

## 2023-08-29 NOTE — BH TRANSITION RECORD
Contact Information: If you have any questions, concerns, pended studies, tests and/or procedures, or emergencies regarding your inpatient behavioral health visit. Please contact [facility] behavioral health unit [facility] [phone] and ask to speak to a , nurse or physician. A contact is available 24 hours/ 7 days a week at this number. Summary of Procedures Performed During your Stay:  Below is a list of major procedures performed during your hospital stay and a summary of results:    CT BRAIN - WITHOUT CONTRAST - 8/6/2023: IMPRESSION: No acute intracranial abnormality. EKG 8/6/2023:Normal sinus rhythm with sinus arrhythmia  Rightward axis  Borderline ECG  No previous ECGs available    EKG 8/9/2023:Normal sinus rhythm. Normal ECG      If studies are pending at discharge, follow up with your PCP and/or referring provider.
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
## 2023-08-29 NOTE — NURSING NOTE
About the unit and doing laps in the hallway while listening to music. Pleasant and cooperative on approach. Denies symptoms and is looking forward to discharge today.

## 2023-08-29 NOTE — PLAN OF CARE
Problem: Alteration in Thoughts and Perception  Goal: Verbalize thoughts and feelings  Description: Interventions:  - Promote a nonjudgmental and trusting relationship with the patient through active listening and therapeutic communication  - Assess patient's level of functioning, behavior and potential for risk  - Engage patient in 1 on 1 interactions  - Encourage patient to express fears, feelings, frustrations, and discuss symptoms    - [place] patient to reality, help patient recognize reality-based thinking   - Administer medications as ordered and assess for potential side effects  - Provide the patient education related to the signs and symptoms of the illness and desired effects of prescribed medications  Outcome: Progressing  Goal: Attend and participate in unit activities, including therapeutic, recreational, and educational groups  Description: Interventions:  -Encourage Visitation and family involvement in care  Outcome: Progressing     Problem: Ineffective Coping  Goal: Participates in unit activities  Description: Interventions:  - Provide therapeutic environment   - Provide required programming   - Redirect inappropriate behaviors   Outcome: Progressing

## 2023-08-30 ENCOUNTER — HOSPITAL ENCOUNTER (EMERGENCY)
Facility: HOSPITAL | Age: 33
End: 2023-09-01
Attending: EMERGENCY MEDICINE

## 2023-08-30 DIAGNOSIS — F29 PSYCHOSIS (HCC): Primary | ICD-10-CM

## 2023-08-30 DIAGNOSIS — Z00.8 MEDICAL CLEARANCE FOR PSYCHIATRIC ADMISSION: ICD-10-CM

## 2023-08-30 LAB
AMPHETAMINES SERPL QL SCN: NEGATIVE
BARBITURATES UR QL: NEGATIVE
BENZODIAZ UR QL: NEGATIVE
COCAINE UR QL: NEGATIVE
ETHANOL EXG-MCNC: 0 MG/DL
EXT PREGNANCY TEST URINE: NEGATIVE
EXT. CONTROL: NORMAL
FLUAV RNA RESP QL NAA+PROBE: NEGATIVE
FLUBV RNA RESP QL NAA+PROBE: NEGATIVE
METHADONE UR QL: NEGATIVE
OPIATES UR QL SCN: NEGATIVE
OXYCODONE+OXYMORPHONE UR QL SCN: NEGATIVE
PCP UR QL: NEGATIVE
RSV RNA RESP QL NAA+PROBE: NEGATIVE
SARS-COV-2 RNA RESP QL NAA+PROBE: NEGATIVE
THC UR QL: POSITIVE

## 2023-08-30 PROCEDURE — 99285 EMERGENCY DEPT VISIT HI MDM: CPT

## 2023-08-30 PROCEDURE — 81025 URINE PREGNANCY TEST: CPT | Performed by: EMERGENCY MEDICINE

## 2023-08-30 PROCEDURE — 99285 EMERGENCY DEPT VISIT HI MDM: CPT | Performed by: EMERGENCY MEDICINE

## 2023-08-30 PROCEDURE — 80307 DRUG TEST PRSMV CHEM ANLYZR: CPT | Performed by: EMERGENCY MEDICINE

## 2023-08-30 PROCEDURE — 82075 ASSAY OF BREATH ETHANOL: CPT | Performed by: EMERGENCY MEDICINE

## 2023-08-30 PROCEDURE — 0241U HB NFCT DS VIR RESP RNA 4 TRGT: CPT | Performed by: EMERGENCY MEDICINE

## 2023-08-30 RX ORDER — LANOLIN ALCOHOL/MO/W.PET/CERES
3 CREAM (GRAM) TOPICAL
Status: DISCONTINUED | OUTPATIENT
Start: 2023-08-30 | End: 2023-09-01 | Stop reason: HOSPADM

## 2023-08-30 RX ORDER — LORAZEPAM 1 MG/1
1 TABLET ORAL ONCE
Status: COMPLETED | OUTPATIENT
Start: 2023-08-30 | End: 2023-08-30

## 2023-08-30 RX ADMIN — LORAZEPAM 1 MG: 1 TABLET ORAL at 17:36

## 2023-08-30 RX ADMIN — OLANZAPINE 25 MG: 10 TABLET, ORALLY DISINTEGRATING ORAL at 17:45

## 2023-08-30 RX ADMIN — Medication 3 MG: at 23:33
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
## 2023-08-30 NOTE — LETTER
[address] [facility] [address]  Dept: [phone]      EMTALA TRANSFER CONSENT    NAME Marissa Love                    1990                              MRN [id number]    I have been informed of my rights regarding examination, treatment, and transfer   by Dr. Isak Wu DO    Benefits: Specialized equipment and/or services available at the receiving facility (Include comment)________________________    Risks: Potential for delay in receiving treatment      Consent for Transfer:  I acknowledge that my medical condition has been evaluated and explained to me by the emergency department physician or other qualified medical person and/or my attending physician, who has recommended that I be transferred to the service of  Accepting Physician: Dr. Slick Bella at [address] Name, [address] [facility] [address] : [address], [address] [place] [address] Street., [place] [address]. The above potential benefits of such transfer, the potential risks associated with such transfer, and the probable risks of not being transferred have been explained to me, and I fully understand them. The doctor has explained that, in my case, the benefits of transfer outweigh the risks. I agree to be transferred. I authorize the performance of emergency medical procedures and treatments upon me in both transit and upon arrival at the receiving facility. Additionally, I authorize the release of any and all medical records to the receiving facility and request they be transported with me, if possible. I understand that the safest mode of transportation during a medical emergency is an ambulance and that the Hospital advocates the use of this mode of transport. Risks of traveling to the receiving facility by car, including absence of medical control, life sustaining equipment, such as oxygen, and medical personnel has been explained to me and I fully understand them.     (JASPER CORRECT BOX BELOW)  [X]  I consent to the stated transfer and to be transported by ambulance/helicopter. [  ]  I consent to the stated transfer, but refuse transportation by ambulance and accept full responsibility for my transportation by car. I understand the risks of non-ambulance transfers and I exonerate the Hospital and its staff from any deterioration in my condition that results from this refusal.    X___________________________________________    DATE  23  TIME________  Signature of patient or legally responsible individual signing on patient behalf           RELATIONSHIP TO PATIENT_________________________                  Provider Certification    NAME Fernando Arroyo                             1990                              MRN [id number]    A medical screening exam was performed on the above named patient. Based on the examination:    Condition Necessitating Transfer The primary encounter diagnosis was Psychosis ([address]). A diagnosis of Medical clearance for psychiatric admission was also pertinent to this visit.     Patient Condition: The patient has been stabilized such that within reasonable medical probability, no material deterioration of the patient condition or the condition of the unborn child(freedom) is likely to result from the transfer    Reason for Transfer: Level of Care needed not available at this facility    Transfer Requirements: Facility [address] [facility]   Space available and qualified personnel available for treatment as acknowledged by Mariya Crawford, [address], [phone]  Agreed to accept transfer and to provide appropriate medical treatment as acknowledged by       Dr. Kathleen Burt  Appropriate medical records of the examination and treatment of the patient are provided at the time of transfer   [address] _______  Transfer will be performed by qualified personnel from [address]  and appropriate transfer equipment as required, including the use of necessary and appropriate life support measures. Provider Certification: I have examined the patient and explained the following risks and benefits of being transferred/refusing transfer to the patient/family:         Based on these reasonable risks and benefits to the patient and/or the unborn child(freedom), and based upon the information available at the time of the patient’s examination, I certify that the medical benefits reasonably to be expected from the provision of appropriate medical treatments at another medical facility outweigh the increasing risks, if any, to the individual’s medical condition, and in the case of labor to the unborn child, from effecting the transfer.     X____________________________________________ DATE 09/01/23        TIME_______      ORIGINAL - SEND TO MEDICAL RECORDS   COPY - SEND WITH PATIENT DURING TRANSFER
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
## 2023-08-31 LAB
ATRIAL RATE: 84 BPM
P AXIS: 71 DEGREES
PR INTERVAL: 164 MS
QRS AXIS: 78 DEGREES
QRSD INTERVAL: 66 MS
QT INTERVAL: 376 MS
QTC INTERVAL: 444 MS
T WAVE AXIS: 70 DEGREES
VENTRICULAR RATE: 84 BPM

## 2023-08-31 PROCEDURE — 93005 ELECTROCARDIOGRAM TRACING: CPT

## 2023-08-31 PROCEDURE — 93010 ELECTROCARDIOGRAM REPORT: CPT | Performed by: INTERNAL MEDICINE

## 2023-08-31 PROCEDURE — 99245 OFF/OP CONSLTJ NEW/EST HI 55: CPT | Performed by: PSYCHIATRY & NEUROLOGY

## 2023-08-31 RX ORDER — DIVALPROEX SODIUM 500 MG/1
500 TABLET, EXTENDED RELEASE ORAL DAILY
Status: DISCONTINUED | OUTPATIENT
Start: 2023-08-31 | End: 2023-09-01 | Stop reason: HOSPADM

## 2023-08-31 RX ADMIN — OLANZAPINE 25 MG: 10 TABLET, ORALLY DISINTEGRATING ORAL at 22:01

## 2023-08-31 RX ADMIN — DIVALPROEX SODIUM 500 MG: 500 TABLET, FILM COATED, EXTENDED RELEASE ORAL at 17:51

## 2023-08-31 RX ADMIN — Medication 3 MG: at 22:01
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
## 2023-08-31 NOTE — ED NOTES
CW received return call from [facility]. Pt is next in line to meet w/Dr. Rolly Campbell. Reginald Warner indicated, a consult was started and pt refused to complete due to receiving meds and feeling tired. Pt was to receive consult at a later time when pt was awake and alert.      TDS, CW

## 2023-08-31 NOTE — ED NOTES
CIS met with patient to discuss inpatient recommendation. Patient is in agreement. CIS will prepare 201.   PATIENT IS REQUESTING [facility] AT THIS TIME

## 2023-08-31 NOTE — ED NOTES
CW spoke w/Pt. CW notes pt denies having completed telepsych consult at any time. This writer is familiar w/this pt. Pt reports having recently being discharged from IP tx. Pt states, "I don't have my medicines w/me but I was just discharged and the doctors gave me medicines." Pt asks, "Am I going back into the hospital because I really think I need to." CW advised pt, pt will speak w/psychiatrist to determine the disposition. CW notes, pt was speaking w/niece on the phone at the time this writer arrived in pt's room.      TDS, CW

## 2023-08-31 NOTE — ED NOTES
CW confirmed w/[facility] call center. Pt's name does not show up in the queue going back to 8/28. Pt did not receive a telepsych consult through [address] [place] [address]. CW requested consult through [facility] at this time, spoke w/Rhianna. As per Raj Herman, a  will return call to this writer.      TDS, CW

## 2023-08-31 NOTE — ED NOTES
CW notes, pt's nurse noted pt was speaking w/psychiatrist last evening; however, no supporting notes entered by psychiatry to indicate such. Telepsych consult pending.     TDS, CW

## 2023-08-31 NOTE — ED NOTES
CIS prepared 201. 201, rights and 72 hour notice reviewed with patient. Patient expressed understanding. 201 signed by patient.   201 signed by Dr. Ale Melton

## 2023-08-31 NOTE — ED NOTES
201 and facesheet faxed to intake to inquire about bed availability. TT sent to intake to inform of fax.

## 2023-09-01 ENCOUNTER — HOSPITAL ENCOUNTER (INPATIENT)
Facility: HOSPITAL | Age: 33
LOS: 10 days | Discharge: HOME | DRG: 750 | End: 2023-09-11
Attending: PSYCHIATRY & NEUROLOGY | Admitting: PSYCHIATRY & NEUROLOGY
Payer: COMMERCIAL

## 2023-09-01 VITALS
HEART RATE: 88 BPM | RESPIRATION RATE: 16 BRPM | OXYGEN SATURATION: 99 % | SYSTOLIC BLOOD PRESSURE: 117 MMHG | DIASTOLIC BLOOD PRESSURE: 68 MMHG | TEMPERATURE: 98.2 F

## 2023-09-01 DIAGNOSIS — N89.8 VAGINAL LESION: ICD-10-CM

## 2023-09-01 DIAGNOSIS — Z00.8 MEDICAL CLEARANCE FOR PSYCHIATRIC ADMISSION: ICD-10-CM

## 2023-09-01 DIAGNOSIS — G47.00 INSOMNIA, UNSPECIFIED TYPE: ICD-10-CM

## 2023-09-01 DIAGNOSIS — F29 PSYCHOSIS, UNSPECIFIED PSYCHOSIS TYPE (HCC): Primary | ICD-10-CM

## 2023-09-01 RX ORDER — BENZTROPINE MESYLATE 1 MG/ML
1 INJECTION INTRAMUSCULAR; INTRAVENOUS 2 TIMES DAILY PRN
Status: CANCELLED | OUTPATIENT
Start: 2023-09-01

## 2023-09-01 RX ORDER — AMOXICILLIN 250 MG
1 CAPSULE ORAL DAILY PRN
Status: DISCONTINUED | OUTPATIENT
Start: 2023-09-01 | End: 2023-09-04

## 2023-09-01 RX ORDER — DIVALPROEX SODIUM 500 MG/1
500 TABLET, EXTENDED RELEASE ORAL DAILY
Status: CANCELLED | OUTPATIENT
Start: 2023-09-02

## 2023-09-01 RX ORDER — OLANZAPINE 5 MG/1
10 TABLET, ORALLY DISINTEGRATING ORAL ONCE
Status: COMPLETED | OUTPATIENT
Start: 2023-09-01 | End: 2023-09-01

## 2023-09-01 RX ORDER — MAGNESIUM HYDROXIDE/ALUMINUM HYDROXICE/SIMETHICONE 120; 1200; 1200 MG/30ML; MG/30ML; MG/30ML
30 SUSPENSION ORAL EVERY 4 HOURS PRN
Status: CANCELLED | OUTPATIENT
Start: 2023-09-01

## 2023-09-01 RX ORDER — BENZTROPINE MESYLATE 1 MG/ML
1 INJECTION INTRAMUSCULAR; INTRAVENOUS 2 TIMES DAILY PRN
Status: DISCONTINUED | OUTPATIENT
Start: 2023-09-01 | End: 2023-09-11 | Stop reason: HOSPADM

## 2023-09-01 RX ORDER — LORAZEPAM 2 MG/ML
2 INJECTION INTRAMUSCULAR EVERY 6 HOURS PRN
Status: CANCELLED | OUTPATIENT
Start: 2023-09-01

## 2023-09-01 RX ORDER — LORAZEPAM 2 MG/ML
2 INJECTION INTRAMUSCULAR EVERY 6 HOURS PRN
Status: DISCONTINUED | OUTPATIENT
Start: 2023-09-01 | End: 2023-09-11 | Stop reason: HOSPADM

## 2023-09-01 RX ORDER — OLANZAPINE 2.5 MG/1
2.5 TABLET ORAL
Status: DISCONTINUED | OUTPATIENT
Start: 2023-09-01 | End: 2023-09-11 | Stop reason: HOSPADM

## 2023-09-01 RX ORDER — POLYETHYLENE GLYCOL 3350 17 G/17G
17 POWDER, FOR SOLUTION ORAL DAILY PRN
Status: CANCELLED | OUTPATIENT
Start: 2023-09-01

## 2023-09-01 RX ORDER — HYDROXYZINE HYDROCHLORIDE 25 MG/1
50 TABLET, FILM COATED ORAL
Status: CANCELLED | OUTPATIENT
Start: 2023-09-01

## 2023-09-01 RX ORDER — OLANZAPINE 10 MG/1
5 INJECTION, POWDER, LYOPHILIZED, FOR SOLUTION INTRAMUSCULAR
Status: CANCELLED | OUTPATIENT
Start: 2023-09-01

## 2023-09-01 RX ORDER — OLANZAPINE 2.5 MG/1
2.5 TABLET ORAL
Status: CANCELLED | OUTPATIENT
Start: 2023-09-01

## 2023-09-01 RX ORDER — LANOLIN ALCOHOL/MO/W.PET/CERES
3 CREAM (GRAM) TOPICAL
Status: DISCONTINUED | OUTPATIENT
Start: 2023-09-01 | End: 2023-09-11 | Stop reason: HOSPADM

## 2023-09-01 RX ORDER — OLANZAPINE 2.5 MG/1
10 TABLET ORAL
Status: CANCELLED | OUTPATIENT
Start: 2023-09-01

## 2023-09-01 RX ORDER — DIPHENHYDRAMINE HYDROCHLORIDE 50 MG/ML
50 INJECTION INTRAMUSCULAR; INTRAVENOUS EVERY 6 HOURS PRN
Status: CANCELLED | OUTPATIENT
Start: 2023-09-01

## 2023-09-01 RX ORDER — OLANZAPINE 5 MG/1
5 TABLET ORAL
Status: DISCONTINUED | OUTPATIENT
Start: 2023-09-01 | End: 2023-09-11 | Stop reason: HOSPADM

## 2023-09-01 RX ORDER — ACETAMINOPHEN 325 MG/1
650 TABLET ORAL EVERY 6 HOURS PRN
Status: CANCELLED | OUTPATIENT
Start: 2023-09-01

## 2023-09-01 RX ORDER — BENZTROPINE MESYLATE 1 MG/1
1 TABLET ORAL 2 TIMES DAILY PRN
Status: CANCELLED | OUTPATIENT
Start: 2023-09-01

## 2023-09-01 RX ORDER — TRAZODONE HYDROCHLORIDE 50 MG/1
50 TABLET ORAL
Status: CANCELLED | OUTPATIENT
Start: 2023-09-01

## 2023-09-01 RX ORDER — DIVALPROEX SODIUM 500 MG/1
500 TABLET, EXTENDED RELEASE ORAL DAILY
Status: DISCONTINUED | OUTPATIENT
Start: 2023-09-02 | End: 2023-09-04

## 2023-09-01 RX ORDER — LANOLIN ALCOHOL/MO/W.PET/CERES
3 CREAM (GRAM) TOPICAL
Status: CANCELLED | OUTPATIENT
Start: 2023-09-01

## 2023-09-01 RX ORDER — OLANZAPINE 10 MG/1
5 INJECTION, POWDER, LYOPHILIZED, FOR SOLUTION INTRAMUSCULAR
Status: DISCONTINUED | OUTPATIENT
Start: 2023-09-01 | End: 2023-09-11 | Stop reason: HOSPADM

## 2023-09-01 RX ORDER — OLANZAPINE 10 MG/1
10 TABLET ORAL
Status: DISCONTINUED | OUTPATIENT
Start: 2023-09-01 | End: 2023-09-11 | Stop reason: HOSPADM

## 2023-09-01 RX ORDER — OLANZAPINE 10 MG/1
10 INJECTION, POWDER, LYOPHILIZED, FOR SOLUTION INTRAMUSCULAR
Status: CANCELLED | OUTPATIENT
Start: 2023-09-01

## 2023-09-01 RX ORDER — AMOXICILLIN 250 MG
1 CAPSULE ORAL DAILY PRN
Status: CANCELLED | OUTPATIENT
Start: 2023-09-01

## 2023-09-01 RX ORDER — PROPRANOLOL HYDROCHLORIDE 10 MG/1
10 TABLET ORAL EVERY 8 HOURS PRN
Status: DISCONTINUED | OUTPATIENT
Start: 2023-09-01 | End: 2023-09-11 | Stop reason: HOSPADM

## 2023-09-01 RX ORDER — DIPHENHYDRAMINE HYDROCHLORIDE 50 MG/ML
50 INJECTION INTRAMUSCULAR; INTRAVENOUS EVERY 6 HOURS PRN
Status: DISCONTINUED | OUTPATIENT
Start: 2023-09-01 | End: 2023-09-11 | Stop reason: HOSPADM

## 2023-09-01 RX ORDER — HYDROXYZINE HYDROCHLORIDE 25 MG/1
25 TABLET, FILM COATED ORAL
Status: DISCONTINUED | OUTPATIENT
Start: 2023-09-01 | End: 2023-09-11 | Stop reason: HOSPADM

## 2023-09-01 RX ORDER — MAGNESIUM HYDROXIDE/ALUMINUM HYDROXICE/SIMETHICONE 120; 1200; 1200 MG/30ML; MG/30ML; MG/30ML
30 SUSPENSION ORAL EVERY 4 HOURS PRN
Status: DISCONTINUED | OUTPATIENT
Start: 2023-09-01 | End: 2023-09-11 | Stop reason: HOSPADM

## 2023-09-01 RX ORDER — HYDROXYZINE 50 MG/1
100 TABLET, FILM COATED ORAL
Status: DISCONTINUED | OUTPATIENT
Start: 2023-09-01 | End: 2023-09-11 | Stop reason: HOSPADM

## 2023-09-01 RX ORDER — HYDROXYZINE 50 MG/1
50 TABLET, FILM COATED ORAL
Status: DISCONTINUED | OUTPATIENT
Start: 2023-09-01 | End: 2023-09-11 | Stop reason: HOSPADM

## 2023-09-01 RX ORDER — OLANZAPINE 10 MG/1
10 INJECTION, POWDER, LYOPHILIZED, FOR SOLUTION INTRAMUSCULAR
Status: DISCONTINUED | OUTPATIENT
Start: 2023-09-01 | End: 2023-09-11 | Stop reason: HOSPADM

## 2023-09-01 RX ORDER — PROPRANOLOL HYDROCHLORIDE 20 MG/1
10 TABLET ORAL EVERY 8 HOURS PRN
Status: CANCELLED | OUTPATIENT
Start: 2023-09-01

## 2023-09-01 RX ORDER — HYDROXYZINE HYDROCHLORIDE 25 MG/1
25 TABLET, FILM COATED ORAL
Status: CANCELLED | OUTPATIENT
Start: 2023-09-01

## 2023-09-01 RX ORDER — ACETAMINOPHEN 325 MG/1
975 TABLET ORAL EVERY 6 HOURS PRN
Status: DISCONTINUED | OUTPATIENT
Start: 2023-09-01 | End: 2023-09-11 | Stop reason: HOSPADM

## 2023-09-01 RX ORDER — TRAZODONE HYDROCHLORIDE 50 MG/1
50 TABLET ORAL
Status: DISCONTINUED | OUTPATIENT
Start: 2023-09-01 | End: 2023-09-11 | Stop reason: HOSPADM

## 2023-09-01 RX ORDER — ACETAMINOPHEN 325 MG/1
975 TABLET ORAL EVERY 6 HOURS PRN
Status: CANCELLED | OUTPATIENT
Start: 2023-09-01

## 2023-09-01 RX ORDER — ACETAMINOPHEN 325 MG/1
650 TABLET ORAL EVERY 6 HOURS PRN
Status: DISCONTINUED | OUTPATIENT
Start: 2023-09-01 | End: 2023-09-11 | Stop reason: HOSPADM

## 2023-09-01 RX ORDER — ACETAMINOPHEN 325 MG/1
650 TABLET ORAL EVERY 4 HOURS PRN
Status: CANCELLED | OUTPATIENT
Start: 2023-09-01

## 2023-09-01 RX ORDER — BENZTROPINE MESYLATE 1 MG/1
1 TABLET ORAL 2 TIMES DAILY PRN
Status: DISCONTINUED | OUTPATIENT
Start: 2023-09-01 | End: 2023-09-11 | Stop reason: HOSPADM

## 2023-09-01 RX ORDER — HYDROXYZINE HYDROCHLORIDE 25 MG/1
100 TABLET, FILM COATED ORAL
Status: CANCELLED | OUTPATIENT
Start: 2023-09-01

## 2023-09-01 RX ORDER — OLANZAPINE 2.5 MG/1
5 TABLET ORAL
Status: CANCELLED | OUTPATIENT
Start: 2023-09-01

## 2023-09-01 RX ORDER — ACETAMINOPHEN 325 MG/1
650 TABLET ORAL EVERY 4 HOURS PRN
Status: DISCONTINUED | OUTPATIENT
Start: 2023-09-01 | End: 2023-09-11 | Stop reason: HOSPADM

## 2023-09-01 RX ORDER — POLYETHYLENE GLYCOL 3350 17 G/17G
17 POWDER, FOR SOLUTION ORAL DAILY PRN
Status: DISCONTINUED | OUTPATIENT
Start: 2023-09-01 | End: 2023-09-04

## 2023-09-01 RX ADMIN — Medication 3 MG: at 21:04

## 2023-09-01 RX ADMIN — OLANZAPINE 25 MG: 10 TABLET, ORALLY DISINTEGRATING ORAL at 21:04

## 2023-09-01 RX ADMIN — DIVALPROEX SODIUM 500 MG: 500 TABLET, FILM COATED, EXTENDED RELEASE ORAL at 08:24

## 2023-09-01 RX ADMIN — OLANZAPINE 10 MG: 5 TABLET, ORALLY DISINTEGRATING ORAL at 10:12

## 2023-09-01 NOTE — ED NOTES
CW prepared transport packet and provided pt w/updates.     CW provided packet to pt's nurse    TDS, CW
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
## 2023-09-01 NOTE — ED NOTES
CW received TT from INTAKE    Pt accepted to [facility]  Under the care of Dr. Hanna Woodruff: [phone]    Pt may be picked up at 1500 or later  CW placed transport request in [facility], [address]

## 2023-09-01 NOTE — ED NOTES
CW confirmed clinicals are w/[facility].     Pt does NOT have current insurance and will require in network placement and or [facility] bed    TDS, CW

## 2023-09-01 NOTE — PLAN OF CARE
Problem: Ineffective Coping  Goal: Cooperates with admission process  Description: Interventions:   - Complete admission process  9/1/2023 1610 by Ambrocio Giron RN  Outcome: Progressing  9/1/2023 1610 by Ambrocio Giron RN  Outcome: Not Progressing     Problem: Alteration in Thoughts and Perception  Goal: Verbalize thoughts and feelings  Description: Interventions:  - Promote a nonjudgmental and trusting relationship with the patient through active listening and therapeutic communication  - Assess patient's level of functioning, behavior and potential for risk  - Engage patient in 1 on 1 interactions  - Encourage patient to express fears, feelings, frustrations, and discuss symptoms    - [place] patient to reality, help patient recognize reality-based thinking   - Administer medications as ordered and assess for potential side effects  - Provide the patient education related to the signs and symptoms of the illness and desired effects of prescribed medications  Outcome: Not Progressing  Goal: Agree to be compliant with medication regime, as prescribed and report medication side effects  Description: Interventions:  - Offer appropriate PRN medication and supervise ingestion; conduct AIMS, as needed   Outcome: Not Progressing  Goal: Attend and participate in unit activities, including therapeutic, recreational, and educational groups  Description: Interventions:  -Encourage Visitation and family involvement in care  Outcome: Not Progressing     Problem: Ineffective Coping  Goal: Identifies ineffective coping skills  Outcome: Not Progressing  Goal: Identifies healthy coping skills  Outcome: Not Progressing     Problem: Risk for Self Injury/Neglect  Goal: Refrain from harming self  Description: Interventions:  - Monitor patient closely, per order  - Develop a trusting relationship  - Supervise medication ingestion, monitor effects and side effects   Outcome: Not Progressing     Problem: Anxiety  Goal: Anxiety is at manageable level  Description: Interventions:  - Assess and monitor patient's anxiety level. - Monitor for signs and symptoms (heart palpitations, chest pain, shortness of breath, headaches, nausea, feeling jumpy, restlessness, irritable, apprehensive). - Collaborate with interdisciplinary team and initiate plan and interventions as ordered.   - [place] patient to unit/surroundings  - Explain treatment plan  - Encourage participation in care  - Encourage verbalization of concerns/fears  - Identify coping mechanisms  - Assist in developing anxiety-reducing skills  - Administer/offer alternative therapies  - Limit or eliminate stimulants  Outcome: Not Progressing     Problem: Risk for Violence/Aggression Toward Others  Goal: Control angry outbursts  Description: Interventions:  - Monitor patient closely, per order  - Ensure early verbal de-escalation  - Monitor prn medication needs  - Set reasonable/therapeutic limits, outline behavioral expectations, and consequences   - Provide a non-threatening milieu, utilizing the least restrictive interventions   Outcome: Not Progressing

## 2023-09-01 NOTE — ED NOTES
Warm blanket given pt walked to the bathroom gait steady.  1 to 1 obs continues     Celia Jc RN  09/01/23 9709

## 2023-09-01 NOTE — ED NOTES
JUAREZ notes, pt does not have active insurance - No prior auth required    [employer] Authorization for admission:   Phone call placed to **. Phone number: **.     Spoke to **.     ** days approved. Level of care: **.  Review on **. Authorization # **. Eligibility Verification System checked - ([phone]). Online system / automated system indicates: **    Insurance Authorization for Transportation:    Phone call placed to **. Phone number **. Spoke to **.    Authorization #: **    JUAREZ MOSS perirectal abscess

## 2023-09-02 LAB
25(OH)D3 SERPL-MCNC: 44.5 NG/ML (ref 30–100)
ALBUMIN SERPL BCP-MCNC: 4.2 G/DL (ref 3.5–5)
ALP SERPL-CCNC: 42 U/L (ref 34–104)
ALT SERPL W P-5'-P-CCNC: 34 U/L (ref 7–52)
ANION GAP SERPL CALCULATED.3IONS-SCNC: 8 MMOL/L
AST SERPL W P-5'-P-CCNC: 22 U/L (ref 13–39)
BACTERIA UR QL AUTO: NORMAL /HPF
BASOPHILS # BLD AUTO: 0.02 THOUSANDS/ÂΜL (ref 0–0.1)
BASOPHILS NFR BLD AUTO: 1 % (ref 0–1)
BILIRUB SERPL-MCNC: 0.58 MG/DL (ref 0.2–1)
BILIRUB UR QL STRIP: NEGATIVE
BUN SERPL-MCNC: 13 MG/DL (ref 5–25)
CALCIUM SERPL-MCNC: 8.9 MG/DL (ref 8.4–10.2)
CHLORIDE SERPL-SCNC: 101 MMOL/L (ref 96–108)
CHOLEST SERPL-MCNC: 212 MG/DL
CLARITY UR: CLEAR
CO2 SERPL-SCNC: 28 MMOL/L (ref 21–32)
COLOR UR: NORMAL
CREAT SERPL-MCNC: 0.75 MG/DL (ref 0.6–1.3)
EOSINOPHIL # BLD AUTO: 0.06 THOUSAND/ÂΜL (ref 0–0.61)
EOSINOPHIL NFR BLD AUTO: 2 % (ref 0–6)
ERYTHROCYTE [DISTWIDTH] IN BLOOD BY AUTOMATED COUNT: 13.4 % (ref 11.6–15.1)
GFR SERPL CREATININE-BSD FRML MDRD: 105 ML/MIN/1.73SQ M
GLUCOSE P FAST SERPL-MCNC: 91 MG/DL (ref 65–99)
GLUCOSE SERPL-MCNC: 91 MG/DL (ref 65–140)
GLUCOSE UR STRIP-MCNC: NEGATIVE MG/DL
HCT VFR BLD AUTO: 43.2 % (ref 34.8–46.1)
HDLC SERPL-MCNC: 57 MG/DL
HGB BLD-MCNC: 13.6 G/DL (ref 11.5–15.4)
HGB UR QL STRIP.AUTO: NEGATIVE
IMM GRANULOCYTES # BLD AUTO: 0 THOUSAND/UL (ref 0–0.2)
IMM GRANULOCYTES NFR BLD AUTO: 0 % (ref 0–2)
KETONES UR STRIP-MCNC: NEGATIVE MG/DL
LDLC SERPL CALC-MCNC: 138 MG/DL (ref 0–100)
LEUKOCYTE ESTERASE UR QL STRIP: NEGATIVE
LYMPHOCYTES # BLD AUTO: 1.6 THOUSANDS/ÂΜL (ref 0.6–4.47)
LYMPHOCYTES NFR BLD AUTO: 42 % (ref 14–44)
MCH RBC QN AUTO: 26.8 PG (ref 26.8–34.3)
MCHC RBC AUTO-ENTMCNC: 31.5 G/DL (ref 31.4–37.4)
MCV RBC AUTO: 85 FL (ref 82–98)
MONOCYTES # BLD AUTO: 0.29 THOUSAND/ÂΜL (ref 0.17–1.22)
MONOCYTES NFR BLD AUTO: 8 % (ref 4–12)
NEUTROPHILS # BLD AUTO: 1.89 THOUSANDS/ÂΜL (ref 1.85–7.62)
NEUTS SEG NFR BLD AUTO: 47 % (ref 43–75)
NITRITE UR QL STRIP: NEGATIVE
NON-SQ EPI CELLS URNS QL MICRO: NORMAL /HPF
NONHDLC SERPL-MCNC: 155 MG/DL
NRBC BLD AUTO-RTO: 0 /100 WBCS
PH UR STRIP.AUTO: 6.5 [PH]
PLATELET # BLD AUTO: 257 THOUSANDS/UL (ref 149–390)
PMV BLD AUTO: 9.7 FL (ref 8.9–12.7)
POTASSIUM SERPL-SCNC: 4.1 MMOL/L (ref 3.5–5.3)
PROT SERPL-MCNC: 6.7 G/DL (ref 6.4–8.4)
PROT UR STRIP-MCNC: NEGATIVE MG/DL
RBC # BLD AUTO: 5.08 MILLION/UL (ref 3.81–5.12)
RBC #/AREA URNS AUTO: NORMAL /HPF
SODIUM SERPL-SCNC: 137 MMOL/L (ref 135–147)
SP GR UR STRIP.AUTO: 1.01 (ref 1–1.04)
TRIGL SERPL-MCNC: 87 MG/DL
TSH SERPL DL<=0.05 MIU/L-ACNC: 2 UIU/ML (ref 0.45–4.5)
UROBILINOGEN UA: NEGATIVE MG/DL
WBC # BLD AUTO: 3.86 THOUSAND/UL (ref 4.31–10.16)
WBC #/AREA URNS AUTO: NORMAL /HPF

## 2023-09-02 PROCEDURE — 80053 COMPREHEN METABOLIC PANEL: CPT | Performed by: PSYCHIATRY & NEUROLOGY

## 2023-09-02 PROCEDURE — 86780 TREPONEMA PALLIDUM: CPT | Performed by: PSYCHIATRY & NEUROLOGY

## 2023-09-02 PROCEDURE — 82306 VITAMIN D 25 HYDROXY: CPT | Performed by: PSYCHIATRY & NEUROLOGY

## 2023-09-02 PROCEDURE — 84443 ASSAY THYROID STIM HORMONE: CPT | Performed by: PSYCHIATRY & NEUROLOGY

## 2023-09-02 PROCEDURE — 81001 URINALYSIS AUTO W/SCOPE: CPT | Performed by: PSYCHIATRY & NEUROLOGY

## 2023-09-02 PROCEDURE — 85025 COMPLETE CBC W/AUTO DIFF WBC: CPT | Performed by: PSYCHIATRY & NEUROLOGY

## 2023-09-02 PROCEDURE — 80061 LIPID PANEL: CPT | Performed by: PSYCHIATRY & NEUROLOGY

## 2023-09-02 PROCEDURE — 99223 1ST HOSP IP/OBS HIGH 75: CPT | Performed by: PSYCHIATRY & NEUROLOGY

## 2023-09-02 RX ORDER — ERGOCALCIFEROL 1.25 MG/1
50000 CAPSULE ORAL WEEKLY
Status: DISCONTINUED | OUTPATIENT
Start: 2023-09-02 | End: 2023-09-11 | Stop reason: HOSPADM

## 2023-09-02 RX ADMIN — Medication 3 MG: at 21:04

## 2023-09-02 RX ADMIN — ERGOCALCIFEROL 50000 UNITS: 1.25 CAPSULE ORAL at 11:45

## 2023-09-02 RX ADMIN — HYDROXYZINE HYDROCHLORIDE 100 MG: 50 TABLET, FILM COATED ORAL at 10:16

## 2023-09-02 RX ADMIN — OLANZAPINE 25 MG: 10 TABLET, ORALLY DISINTEGRATING ORAL at 21:04

## 2023-09-02 RX ADMIN — DIVALPROEX SODIUM 500 MG: 500 TABLET, FILM COATED, EXTENDED RELEASE ORAL at 09:24

## 2023-09-02 NOTE — PLAN OF CARE
Problem: Alteration in Thoughts and Perception  Goal: Verbalize thoughts and feelings  Description: Interventions:  - Promote a nonjudgmental and trusting relationship with the patient through active listening and therapeutic communication  - Assess patient's level of functioning, behavior and potential for risk  - Engage patient in 1 on 1 interactions  - Encourage patient to express fears, feelings, frustrations, and discuss symptoms    - [place] patient to reality, help patient recognize reality-based thinking   - Administer medications as ordered and assess for potential side effects  - Provide the patient education related to the signs and symptoms of the illness and desired effects of prescribed medications  9/2/2023 0347 by Ryan Lozada RN  Outcome: Not Progressing  9/2/2023 0345 by Ryan Lozada RN  Outcome: Not Progressing  Goal: Agree to be compliant with medication regime, as prescribed and report medication side effects  Description: Interventions:  - Offer appropriate PRN medication and supervise ingestion; conduct AIMS, as needed   9/2/2023 0347 by Ryan Lozada RN  Outcome: Progressing  9/2/2023 0345 by Ryan Lozada RN  Outcome: Progressing  Goal: Attend and participate in unit activities, including therapeutic, recreational, and educational groups  Description: Interventions:  -Encourage Visitation and family involvement in care  9/2/2023 0347 by Ryan Lozada RN  Outcome: Not Progressing  9/2/2023 0345 by Ryan Lozada RN  Outcome: Not Progressing     Problem: Ineffective Coping  Goal: Cooperates with admission process  Description: Interventions:   - Complete admission process  9/2/2023 0347 by Ryan Lozada RN  Outcome: Progressing  9/2/2023 0345 by Ryan Lozada RN  Outcome: Progressing  Goal: Identifies ineffective coping skills  9/2/2023 0347 by Ryan Lozada RN  Outcome: Progressing  9/2/2023 0345 by Ryan Lozada RN  Outcome: Progressing  Goal: Identifies healthy coping skills  9/2/2023 0347 by Joey Crawford RN  Outcome: Progressing  9/2/2023 0345 by Joey Crawford RN  Outcome: Progressing     Problem: Risk for Self Injury/Neglect  Goal: Refrain from harming self  Description: Interventions:  - Monitor patient closely, per order  - Develop a trusting relationship  - Supervise medication ingestion, monitor effects and side effects   9/2/2023 0347 by Joey Crawford RN  Outcome: Progressing  9/2/2023 0345 by Joey Crawford RN  Outcome: Progressing     Problem: Anxiety  Goal: Anxiety is at manageable level  Description: Interventions:  - Assess and monitor patient's anxiety level. - Monitor for signs and symptoms (heart palpitations, chest pain, shortness of breath, headaches, nausea, feeling jumpy, restlessness, irritable, apprehensive). - Collaborate with interdisciplinary team and initiate plan and interventions as ordered.   - [place] patient to unit/surroundings  - Explain treatment plan  - Encourage participation in care  - Encourage verbalization of concerns/fears  - Identify coping mechanisms  - Assist in developing anxiety-reducing skills  - Administer/offer alternative therapies  - Limit or eliminate stimulants  9/2/2023 0347 by Joey Crawford RN  Outcome: Progressing  9/2/2023 0345 by Joey Crawford RN  Outcome: Progressing     Problem: Risk for Violence/Aggression Toward Others  Goal: Control angry outbursts  Description: Interventions:  - Monitor patient closely, per order  - Ensure early verbal de-escalation  - Monitor prn medication needs  - Set reasonable/therapeutic limits, outline behavioral expectations, and consequences   - Provide a non-threatening milieu, utilizing the least restrictive interventions   9/2/2023 0347 by Joey Crawford RN  Outcome: Progressing  9/2/2023 0345 by Joey Crawford RN  Outcome: Progressing

## 2023-09-02 NOTE — TREATMENT PLAN
TREATMENT PLAN REVIEW - [address] [address]84 y.o. 1990 female MRN: [id number]    [address] Room / Bed: [facility] 350/[facility] 350- Encounter: [id number]          Admit Date/Time:  9/1/2023  3:45 PM    Treatment Team: Attending Provider: Mikal Garcia MD; Consulting Physician: Nataliya Andrade MD; Registered Nurse: Tavon Hawthorne, RN; Patient Care Assistant: Salma Godinez; Patient Care Assistant: Donna Bright; Registered Nurse: Tripp Mace, JONAH; Nursing Student: Lydia Serna    Diagnosis: Principal Problem:    Psychosis ([address]) rule out schizophrenia versus schizoaffective disorder  Active Problems:    Medical clearance for psychiatric admission    Mild tetrahydrocannabinol [facility]) abuse      Patient Strengths/Assets: average or above intelligence, cooperative, communication skills, good past treatment response, reasoning ability    Patient Barriers/Limitations: lack of financial means, limited support system, poor self-care    Short Term Goals: decrease in depressive symptoms, decrease in anxiety symptoms, decrease in paranoid thoughts, decrease in level of agitation, ability to stay safe on the unit, ability to stay free of restraints, improvement in ability to express basic needs, improvement in insight, mood stabilization, increase in group attendance, increase in socialization with peers on the unit    Long Term Goals: stabilization of mood, free of suicidal thoughts, free of homicidal thoughts, resolution of psychotic symptoms, improvement in reality testing, improved insight, acceptance of need for psychiatric medications, acceptance of need for psychiatric treatment, adequate sleep    Progress Towards Goals: starting psychiatric medications as prescribed    Recommended Treatment: medication management, patient medication education, group therapy, milieu therapy, continued Behavioral Health psychiatric evaluation/assessment process    Treatment Frequency: daily medication monitoring, group and milieu therapy daily, monitoring through interdisciplinary rounds, monitoring through weekly patient care conferences    Expected Discharge Date:  9/9/23    Discharge Plan: referral for outpatient medication management with a psychiatrist, referral for outpatient psychotherapy, referrals as indicated, return to previous living arrangement    Treatment Plan Created/Updated By: Honey Medina MD

## 2023-09-02 NOTE — ASSESSMENT & PLAN NOTE
· Patient has a longstanding history of marijuana abuse greater than 1 year  · Marijuana cessation education

## 2023-09-02 NOTE — NURSING NOTE
Patient reported feeling severely anxious. Given 100mg of atarax for severe anxiety. 11:16: Patient states 100mg of atarax was effective for severe anxiety.

## 2023-09-02 NOTE — CONSULTS
[address] [facility]  Consult  Name: Asif Love 28 y.o. female I MRN: [id number]  Unit/Bed#: U 350-01 I Date of Admission: 9/1/2023   Date of Service: 9/2/2023 I Hospital Day: 1    Inpatient consult for Medical Clearance for [address] [address] [facility] [address] patient  Consult performed by: MARY Reyna  Consult ordered by: Ganesh Sprague MD          Assessment/Plan   Medical clearance for psychiatric admission  Assessment & Plan  · Vital signs stable afebrile all labs reviewed by myself  · Patient medically stable for admit  · SL IM will sign off please call with any questions or concerns    Mild tetrahydrocannabinol (THC) abuse  Assessment & Plan  · Patient has a longstanding history of marijuana abuse greater than 1 year  · Marijuana cessation education         This patient is a 30-day readmit no acute changes in medical status. New medical clearance consult is not necessary please see my consult note dated 8/8/2023 for full details.

## 2023-09-02 NOTE — NURSING NOTE
Pt awakened at [facility] for scheduled medications and took easily. Pt requested and received a snack. She stated she had no SI/HI or hallucinations but stated she did not "like the thoughts she was having." She did not wish to elaborate. She returned to bed after eating her snack. Safety checks continue.
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
## 2023-09-02 NOTE — ASSESSMENT & PLAN NOTE
· Vital signs stable afebrile all labs reviewed by myself  · Patient medically stable for admit  · [facility] IM will sign off please call with any questions or concerns
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
## 2023-09-03 LAB — TREPONEMA PALLIDUM IGG+IGM AB [PRESENCE] IN SERUM OR PLASMA BY IMMUNOASSAY: NORMAL

## 2023-09-03 PROCEDURE — 99233 SBSQ HOSP IP/OBS HIGH 50: CPT | Performed by: PSYCHIATRY & NEUROLOGY

## 2023-09-03 PROCEDURE — 93005 ELECTROCARDIOGRAM TRACING: CPT

## 2023-09-03 RX ADMIN — DIVALPROEX SODIUM 500 MG: 500 TABLET, FILM COATED, EXTENDED RELEASE ORAL at 08:55

## 2023-09-03 RX ADMIN — TRAZODONE HYDROCHLORIDE 50 MG: 50 TABLET ORAL at 21:05

## 2023-09-03 RX ADMIN — TRAZODONE HYDROCHLORIDE 50 MG: 50 TABLET ORAL at 00:35

## 2023-09-03 RX ADMIN — Medication 3 MG: at 21:02

## 2023-09-03 RX ADMIN — OLANZAPINE 25 MG: 10 TABLET, ORALLY DISINTEGRATING ORAL at 21:02

## 2023-09-03 NOTE — PLAN OF CARE
Problem: Alteration in Thoughts and Perception  Goal: Verbalize thoughts and feelings  Description: Interventions:  - Promote a nonjudgmental and trusting relationship with the patient through active listening and therapeutic communication  - Assess patient's level of functioning, behavior and potential for risk  - Engage patient in 1 on 1 interactions  - Encourage patient to express fears, feelings, frustrations, and discuss symptoms    - Normal patient to reality, help patient recognize reality-based thinking   - Administer medications as ordered and assess for potential side effects  - Provide the patient education related to the signs and symptoms of the illness and desired effects of prescribed medications  Outcome: Progressing  Goal: Agree to be compliant with medication regime, as prescribed and report medication side effects  Description: Interventions:  - Offer appropriate PRN medication and supervise ingestion; conduct AIMS, as needed   Outcome: Progressing  Goal: Attend and participate in unit activities, including therapeutic, recreational, and educational groups  Description: Interventions:  -Encourage Visitation and family involvement in care  Outcome: Progressing     Problem: Ineffective Coping  Goal: Cooperates with admission process  Description: Interventions:   - Complete admission process  Outcome: Progressing  Goal: Identifies ineffective coping skills  Outcome: Progressing  Goal: Identifies healthy coping skills  Outcome: Progressing     Problem: Risk for Self Injury/Neglect  Goal: Refrain from harming self  Description: Interventions:  - Monitor patient closely, per order  - Develop a trusting relationship  - Supervise medication ingestion, monitor effects and side effects   Outcome: Progressing     Problem: Anxiety  Goal: Anxiety is at manageable level  Description: Interventions:  - Assess and monitor patient's anxiety level.    - Monitor for signs and symptoms (heart palpitations, chest pain, shortness of breath, headaches, nausea, feeling jumpy, restlessness, irritable, apprehensive). - Collaborate with interdisciplinary team and initiate plan and interventions as ordered.   - [place] patient to unit/surroundings  - Explain treatment plan  - Encourage participation in care  - Encourage verbalization of concerns/fears  - Identify coping mechanisms  - Assist in developing anxiety-reducing skills  - Administer/offer alternative therapies  - Limit or eliminate stimulants  Outcome: Progressing     Problem: Risk for Violence/Aggression Toward Others  Goal: Control angry outbursts  Description: Interventions:  - Monitor patient closely, per order  - Ensure early verbal de-escalation  - Monitor prn medication needs  - Set reasonable/therapeutic limits, outline behavioral expectations, and consequences   - Provide a non-threatening milieu, utilizing the least restrictive interventions   Outcome: Progressing

## 2023-09-03 NOTE — NURSING NOTE
Patient is cooperative, calm, and compliant. and has had no behavioral issues to note. She is pleasant. Patient complained of two bright red bloody BM's yesterday and today. Cx of pain when having BM, and constipation. Medical informed, patient started on colace BID and senokot BID.

## 2023-09-03 NOTE — TREATMENT TEAM
09/03/23 1024   Team Meeting   Meeting Type Tx Team Meeting   Initial Conference Date 09/01/23   Next Conference Date 09/30/23   Team Members Present   Team Members Present Physician;Nurse;   Physician Team Member [address] [place] [address]   Nursing Team Member [facility] Management Team Member [email or web address]   Patient/Family Present   Patient Present Yes   Patient's Family Present No     Treatment plan goals reviewed. Pt is in agreement and signed treatment plan. Continue to monitor.
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
## 2023-09-03 NOTE — NURSING NOTE
Patient remained visible on the unit throughout the evening. Pleasant and cooperative with routine. Denied any unmet needs. HS medication compliant.

## 2023-09-03 NOTE — PROGRESS NOTES
[facility] Group Note     09/03/23 1400   Activity/Group Checklist   Group Life Skills  (Teamwork and Communication)   Attendance Attended   Attendance Duration (min) 16-30  (in and out of group)   Interactions Interacted appropriately  (but verbally scant, did not participate in activity, and only talked to select peers)   Affect/Mood Appropriate;Calm   Goals Achieved Able to listen to others; Able to engage in interactions; Able to recieve feedback; Able to give feedback to another  (benefited from social presence of the group)

## 2023-09-03 NOTE — NURSING NOTE
Patient is pleasant, cooperative, and medication compliant this AM. She is in behavioral control and does not appear to be responding to internal stimuli. She denies having further needs.

## 2023-09-04 LAB
ATRIAL RATE: 85 BPM
P AXIS: 59 DEGREES
PR INTERVAL: 168 MS
QRS AXIS: 64 DEGREES
QRSD INTERVAL: 76 MS
QT INTERVAL: 388 MS
QTC INTERVAL: 461 MS
T WAVE AXIS: 53 DEGREES
VALPROATE SERPL-MCNC: 41 UG/ML (ref 50–100)
VENTRICULAR RATE: 85 BPM

## 2023-09-04 PROCEDURE — 99232 SBSQ HOSP IP/OBS MODERATE 35: CPT | Performed by: PSYCHIATRY & NEUROLOGY

## 2023-09-04 PROCEDURE — 80164 ASSAY DIPROPYLACETIC ACD TOT: CPT | Performed by: PSYCHIATRY & NEUROLOGY

## 2023-09-04 PROCEDURE — 93010 ELECTROCARDIOGRAM REPORT: CPT | Performed by: INTERNAL MEDICINE

## 2023-09-04 RX ORDER — DIVALPROEX SODIUM 500 MG/1
1000 TABLET, EXTENDED RELEASE ORAL DAILY
Status: DISCONTINUED | OUTPATIENT
Start: 2023-09-05 | End: 2023-09-11 | Stop reason: HOSPADM

## 2023-09-04 RX ORDER — HYDROCORTISONE 25 MG/G
CREAM TOPICAL 4 TIMES DAILY PRN
Status: DISCONTINUED | OUTPATIENT
Start: 2023-09-04 | End: 2023-09-11 | Stop reason: HOSPADM

## 2023-09-04 RX ORDER — DOCUSATE SODIUM 100 MG/1
100 CAPSULE, LIQUID FILLED ORAL 2 TIMES DAILY
Status: DISCONTINUED | OUTPATIENT
Start: 2023-09-04 | End: 2023-09-11 | Stop reason: HOSPADM

## 2023-09-04 RX ORDER — SENNOSIDES 8.6 MG
2 TABLET ORAL
Status: DISCONTINUED | OUTPATIENT
Start: 2023-09-04 | End: 2023-09-11 | Stop reason: HOSPADM

## 2023-09-04 RX ORDER — HYDROCORTISONE ACETATE 25 MG/1
25 SUPPOSITORY RECTAL 2 TIMES DAILY PRN
Status: DISCONTINUED | OUTPATIENT
Start: 2023-09-04 | End: 2023-09-11 | Stop reason: HOSPADM

## 2023-09-04 RX ORDER — ECHINACEA PURPUREA EXTRACT 125 MG
1 TABLET ORAL
Status: DISCONTINUED | OUTPATIENT
Start: 2023-09-04 | End: 2023-09-11 | Stop reason: HOSPADM

## 2023-09-04 RX ADMIN — DIVALPROEX SODIUM 500 MG: 500 TABLET, FILM COATED, EXTENDED RELEASE ORAL at 09:53

## 2023-09-04 RX ADMIN — TRAZODONE HYDROCHLORIDE 50 MG: 50 TABLET ORAL at 23:45

## 2023-09-04 RX ADMIN — DOCUSATE SODIUM 100 MG: 100 CAPSULE, LIQUID FILLED ORAL at 13:02

## 2023-09-04 RX ADMIN — Medication 3 MG: at 21:16

## 2023-09-04 RX ADMIN — OLANZAPINE 25 MG: 10 TABLET, ORALLY DISINTEGRATING ORAL at 21:16

## 2023-09-04 RX ADMIN — SENNOSIDES 17.2 MG: 8.6 TABLET, FILM COATED ORAL at 21:16

## 2023-09-04 RX ADMIN — DOCUSATE SODIUM 100 MG: 100 CAPSULE, LIQUID FILLED ORAL at 18:05
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
## 2023-09-04 NOTE — PLAN OF CARE
Problem: Alteration in Thoughts and Perception  Goal: Verbalize thoughts and feelings  Description: Interventions:  - Promote a nonjudgmental and trusting relationship with the patient through active listening and therapeutic communication  - Assess patient's level of functioning, behavior and potential for risk  - Engage patient in 1 on 1 interactions  - Encourage patient to express fears, feelings, frustrations, and discuss symptoms    - [place] patient to reality, help patient recognize reality-based thinking   - Administer medications as ordered and assess for potential side effects  - Provide the patient education related to the signs and symptoms of the illness and desired effects of prescribed medications  Outcome: Progressing  Goal: Agree to be compliant with medication regime, as prescribed and report medication side effects  Description: Interventions:  - Offer appropriate PRN medication and supervise ingestion; conduct AIMS, as needed   Outcome: Progressing  Goal: Attend and participate in unit activities, including therapeutic, recreational, and educational groups  Description: Interventions:  -Encourage Visitation and family involvement in care  Outcome: Progressing     Problem: Ineffective Coping  Goal: Cooperates with admission process  Description: Interventions:   - Complete admission process  Outcome: Progressing  Goal: Identifies ineffective coping skills  Outcome: Progressing  Goal: Identifies healthy coping skills  Outcome: Progressing     Problem: Risk for Self Injury/Neglect  Goal: Refrain from harming self  Description: Interventions:  - Monitor patient closely, per order  - Develop a trusting relationship  - Supervise medication ingestion, monitor effects and side effects   Outcome: Progressing     Problem: Anxiety  Goal: Anxiety is at manageable level  Description: Interventions:  - Assess and monitor patient's anxiety level.    - Monitor for signs and symptoms (heart palpitations, chest pain, shortness of breath, headaches, nausea, feeling jumpy, restlessness, irritable, apprehensive). - Collaborate with interdisciplinary team and initiate plan and interventions as ordered.   - [place] patient to unit/surroundings  - Explain treatment plan  - Encourage participation in care  - Encourage verbalization of concerns/fears  - Identify coping mechanisms  - Assist in developing anxiety-reducing skills  - Administer/offer alternative therapies  - Limit or eliminate stimulants  Outcome: Progressing     Problem: Risk for Violence/Aggression Toward Others  Goal: Control angry outbursts  Description: Interventions:  - Monitor patient closely, per order  - Ensure early verbal de-escalation  - Monitor prn medication needs  - Set reasonable/therapeutic limits, outline behavioral expectations, and consequences   - Provide a non-threatening milieu, utilizing the least restrictive interventions   Outcome: Progressing     Problem: DISCHARGE PLANNING  Goal: Discharge to home or other facility with appropriate resources  Description: INTERVENTIONS:  - Identify barriers to discharge w/patient and caregiver  - Arrange for needed discharge resources and transportation as appropriate  - Identify discharge learning needs (meds, wound care, etc.)  - Arrange for interpretive services to assist at discharge as needed  - Refer to Case Management Department for coordinating discharge planning if the patient needs post-hospital services based on physician/advanced practitioner order or complex needs related to functional status, cognitive ability, or social support system  Outcome: Progressing

## 2023-09-05 PROCEDURE — 99232 SBSQ HOSP IP/OBS MODERATE 35: CPT | Performed by: PSYCHIATRY & NEUROLOGY

## 2023-09-05 RX ADMIN — DOCUSATE SODIUM 100 MG: 100 CAPSULE, LIQUID FILLED ORAL at 09:12

## 2023-09-05 RX ADMIN — OLANZAPINE 25 MG: 10 TABLET, ORALLY DISINTEGRATING ORAL at 21:30

## 2023-09-05 RX ADMIN — SENNOSIDES 17.2 MG: 8.6 TABLET, FILM COATED ORAL at 21:30

## 2023-09-05 RX ADMIN — DOCUSATE SODIUM 100 MG: 100 CAPSULE, LIQUID FILLED ORAL at 17:09

## 2023-09-05 RX ADMIN — Medication 3 MG: at 21:30

## 2023-09-05 RX ADMIN — DIVALPROEX SODIUM 1000 MG: 500 TABLET, FILM COATED, EXTENDED RELEASE ORAL at 09:12

## 2023-09-05 NOTE — PROGRESS NOTES
[facility] Group Note     09/05/23 1330   Activity/Group Checklist   Group Life Skills  (Positive Vs. Negative Statements)   Attendance Attended   Attendance Duration (min) Greater than 60   Interactions Interacted appropriately  (more invested than previous encounters)   Affect/Mood Appropriate;Bright  (Focused and motivated)   Goals Achieved Identified feelings; Identified triggers; Discussed coping strategies; Able to listen to others; Able to engage in interactions; Able to reflect/comment on own behavior;Able to self-disclose; Able to recieve feedback; Able to give feedback to another
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
## 2023-09-05 NOTE — NURSING NOTE
Pt is visible on the unit but withdrawn to self. Superficially pleasant. Encouraged to attend groups. Med and meal compliant. Denies SI/HI/AH/CH at this time. Denies any unmet needs or complaints at this time.
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
## 2023-09-05 NOTE — PLAN OF CARE
Problem: Ineffective Coping  Goal: Cooperates with admission process  Description: Interventions:   - Complete admission process  Outcome: Progressing  Goal: Identifies ineffective coping skills  Outcome: Progressing  Goal: Identifies healthy coping skills  Outcome: Progressing     Problem: Risk for Self Injury/Neglect  Goal: Refrain from harming self  Description: Interventions:  - Monitor patient closely, per order  - Develop a trusting relationship  - Supervise medication ingestion, monitor effects and side effects   Outcome: Progressing     Problem: Alteration in Thoughts and Perception  Goal: Verbalize thoughts and feelings  Description: Interventions:  - Promote a nonjudgmental and trusting relationship with the patient through active listening and therapeutic communication  - Assess patient's level of functioning, behavior and potential for risk  - Engage patient in 1 on 1 interactions  - Encourage patient to express fears, feelings, frustrations, and discuss symptoms    - [place] patient to reality, help patient recognize reality-based thinking   - Administer medications as ordered and assess for potential side effects  - Provide the patient education related to the signs and symptoms of the illness and desired effects of prescribed medications  Outcome: Progressing  Goal: Agree to be compliant with medication regime, as prescribed and report medication side effects  Description: Interventions:  - Offer appropriate PRN medication and supervise ingestion; conduct AIMS, as needed   Outcome: Progressing  Goal: Attend and participate in unit activities, including therapeutic, recreational, and educational groups  Description: Interventions:  -Encourage Visitation and family involvement in care  Outcome: Progressing
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
## 2023-09-06 PROCEDURE — 99232 SBSQ HOSP IP/OBS MODERATE 35: CPT | Performed by: PSYCHIATRY & NEUROLOGY

## 2023-09-06 RX ADMIN — Medication 3 MG: at 21:13

## 2023-09-06 RX ADMIN — SENNOSIDES 17.2 MG: 8.6 TABLET, FILM COATED ORAL at 21:13

## 2023-09-06 RX ADMIN — DIVALPROEX SODIUM 1000 MG: 500 TABLET, FILM COATED, EXTENDED RELEASE ORAL at 09:09

## 2023-09-06 RX ADMIN — OLANZAPINE 25 MG: 10 TABLET, ORALLY DISINTEGRATING ORAL at 21:13

## 2023-09-06 RX ADMIN — DOCUSATE SODIUM 100 MG: 100 CAPSULE, LIQUID FILLED ORAL at 17:14

## 2023-09-06 RX ADMIN — DOCUSATE SODIUM 100 MG: 100 CAPSULE, LIQUID FILLED ORAL at 09:09

## 2023-09-06 NOTE — NURSING NOTE
PT visible, cooperative and calm interacting with selected peers, pleasant upon approach. PT denies SI/HI/VH/AH, no pain/discomfort reported. PT compliant with HS meds and unit routine. Sleeping at this time. NO PRNs given.

## 2023-09-06 NOTE — PROGRESS NOTES
09/06/23 0857   Team Meeting   Meeting Type Daily Rounds   Team Members Present   Team Members Present Physician;Nurse;   Physician Team Member [id number] Member Gladis   Patient/Family Present   Patient Present No   Patient's Family Present No     Pt med/meal compliant. Visible on the unit, social with peers. Pt was started on Depakote.  Discharge to be determined
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
## 2023-09-06 NOTE — PROGRESS NOTES
09/06/23 1000   Activity/Group Checklist   Group Other (Comment)  (Group Art Therapy/Psychodynamic, Visual Introduction with Discussion)   Attendance Attended   Attendance Duration (min) Greater than 60   Interactions Interacted appropriately   Affect/Mood Appropriate   Goals Achieved Discussed coping strategies; Able to listen to others; Able to engage in interactions; Able to recieve feedback; Able to give feedback to another  (Able to engage materials; full participation)

## 2023-09-06 NOTE — PLAN OF CARE
Problem: Alteration in Thoughts and Perception  Goal: Verbalize thoughts and feelings  Description: Interventions:  - Promote a nonjudgmental and trusting relationship with the patient through active listening and therapeutic communication  - Assess patient's level of functioning, behavior and potential for risk  - Engage patient in 1 on 1 interactions  - Encourage patient to express fears, feelings, frustrations, and discuss symptoms    - [place] patient to reality, help patient recognize reality-based thinking   - Administer medications as ordered and assess for potential side effects  - Provide the patient education related to the signs and symptoms of the illness and desired effects of prescribed medications  Outcome: Progressing  Goal: Agree to be compliant with medication regime, as prescribed and report medication side effects  Description: Interventions:  - Offer appropriate PRN medication and supervise ingestion; conduct AIMS, as needed   Outcome: Progressing  Goal: Attend and participate in unit activities, including therapeutic, recreational, and educational groups  Description: Interventions:  -Encourage Visitation and family involvement in care  Outcome: Progressing     Problem: Ineffective Coping  Goal: Cooperates with admission process  Description: Interventions:   - Complete admission process  Outcome: Progressing  Goal: Identifies ineffective coping skills  Outcome: Progressing  Goal: Identifies healthy coping skills  Outcome: Progressing     Problem: Risk for Self Injury/Neglect  Goal: Refrain from harming self  Description: Interventions:  - Monitor patient closely, per order  - Develop a trusting relationship  - Supervise medication ingestion, monitor effects and side effects   Outcome: Progressing     Problem: Anxiety  Goal: Anxiety is at manageable level  Description: Interventions:  - Assess and monitor patient's anxiety level.    - Monitor for signs and symptoms (heart palpitations, chest pain, shortness of breath, headaches, nausea, feeling jumpy, restlessness, irritable, apprehensive). - Collaborate with interdisciplinary team and initiate plan and interventions as ordered.   - [place] patient to unit/surroundings  - Explain treatment plan  - Encourage participation in care  - Encourage verbalization of concerns/fears  - Identify coping mechanisms  - Assist in developing anxiety-reducing skills  - Administer/offer alternative therapies  - Limit or eliminate stimulants  Outcome: Progressing     Problem: Risk for Violence/Aggression Toward Others  Goal: Control angry outbursts  Description: Interventions:  - Monitor patient closely, per order  - Ensure early verbal de-escalation  - Monitor prn medication needs  - Set reasonable/therapeutic limits, outline behavioral expectations, and consequences   - Provide a non-threatening milieu, utilizing the least restrictive interventions   Outcome: Progressing     Problem: Ineffective Coping  Goal: Participates in unit activities  Description: Interventions:  - Provide therapeutic environment   - Provide required programming   - Redirect inappropriate behaviors   Outcome: Progressing

## 2023-09-06 NOTE — TREATMENT TEAM
09/06/23 0941   Activity/Group Checklist   Group Community meeting   Attendance Attended   Attendance Duration (min) 16-30   Interactions Interacted appropriately   Affect/Mood Appropriate   Goals Achieved Able to listen to others; Able to engage in interactions; Able to self-disclose; Able to recieve feedback
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
## 2023-09-07 PROCEDURE — 99232 SBSQ HOSP IP/OBS MODERATE 35: CPT | Performed by: PSYCHIATRY & NEUROLOGY

## 2023-09-07 RX ADMIN — SENNOSIDES 17.2 MG: 8.6 TABLET, FILM COATED ORAL at 21:03

## 2023-09-07 RX ADMIN — Medication 3 MG: at 21:03

## 2023-09-07 RX ADMIN — DOCUSATE SODIUM 100 MG: 100 CAPSULE, LIQUID FILLED ORAL at 09:10

## 2023-09-07 RX ADMIN — OLANZAPINE 25 MG: 10 TABLET, ORALLY DISINTEGRATING ORAL at 21:03

## 2023-09-07 RX ADMIN — DOCUSATE SODIUM 100 MG: 100 CAPSULE, LIQUID FILLED ORAL at 17:37

## 2023-09-07 RX ADMIN — DIVALPROEX SODIUM 1000 MG: 500 TABLET, FILM COATED, EXTENDED RELEASE ORAL at 09:10

## 2023-09-07 NOTE — PROGRESS NOTES
Progress Note - Behavioral Health   [facility] 28 y.o. female MRN: [id number]  Unit/Bed#: [facility] 350-01 Encounter: [id number]    Assessment/Plan   Principal Problem:    Psychosis ([address]) rule out schizophrenia versus schizoaffective disorder  Active Problems:    Medical clearance for psychiatric admission    Mild tetrahydrocannabinol (THC) abuse      Recommended Treatment:   · Continue Zyprexa 25 mg daily for psychosis  · Consider further optimization (to 30 mg)  · Continue Depakote ER 1000 mg daily for mood stability  · Follow-up valproic acid level on 9/8/2023 (Friday) prior to morning dose; consider further optimization based on findings  · Continue to monitor blood pressures and encourage fluid and salt/food intake    Continue with group therapy, milieu therapy and occupational therapy. Continue frequent safety checks and vitals per unit protocol. Case discussed with treatment team.  Risks, benefits and possible side effects of Medications: Risks, benefits, and possible side effects of medications have been explained to the patient, who verbalizes understanding    Current Legal Status: 201  Disposition: To be determined. Possible early next week    ------------------------------------------------------------    Subjective: Per nursing report, Renetta Taylor has been cooperative on the unit and compliant with scheduled medications. Over the last 24 hours, she received no as needed medications. Today, Renetta Taylor is consenting for safety on the unit. She reports that she feels improved today, although slightly tired due to medications. She understands the plan moving forward to have her levels drawn and medications adjusted based on findings. She denies any SI, HI, AVH, anxiety. Does report feeling depressed over difficulties with her lost job and no longer having housing at her apartment. She feels her life is difficult and she will be a "loser", but she will push through this and fix things she reported.   She does not want to go back to [place] and would rather stay in the area and work on getting housing and a new job. Reports continued paranoid thoughts about the [address] after her due to her ex-boyfriend having them tracker. Denies other paranoid thoughts, denying telecommunication, thoughts that TV are communicating with her, safety concerns of being followed or watched. She understands that she will likely be discharged next week and is in agreement and is agreeable with switching to nighttime dosing for medications if daytime sedation continues. Progress Toward Goals: slow improvement    Psychiatric Review of Systems:  Behavior over the last 24 hours: improved  Sleep: normal  Appetite: adequate  Medication side effects: tired during daytime  ROS: Complete review of systems is negative except as noted above.     Vital signs in last 24 hours:  Temp:  [97.4 °F (36.3 °C)-97.5 °F (36.4 °C)] 97.5 °F (36.4 °C)  HR:  [78-83] 78  Resp:  [16] 16  BP: (96)/(54-64) 96/54    Mental Status Exam:  Appearance:  alert, good eye contact, appears stated age, casually dressed and appropriate grooming and hygiene   Behavior:  calm, cooperative and sitting comfortably   Motor: no abnormal movements and normal gait and balance   Speech:  spontaneous and coherent   Mood:  "better"   Affect:  mood-congruent and euthymic   Thought Process:  generally linear and goal-directed but perseverative at times about taking care of of cell phone difficulties, job, life outside the hospital and ex-boyfriend   Thought Content: no verbalized delusions or overt paranoia, paranoid about ex-BF at times   Perceptual disturbances: no reported hallucinations and does not appear to be responding to internal stimuli at this time   Risk Potential: No active or passive suicidal or homicidal ideation was verbalized during interview   Cognition: oriented to self and situation, appears to be of average intelligence and cognition not formally tested Insight:  Improving   Judgment: Improving     Current Medications:  Current Facility-Administered Medications   Medication Dose Route Frequency Provider Last Rate   • acetaminophen  650 mg Oral Q6H PRN Fernanda Marie MD     • acetaminophen  650 mg Oral Q4H PRN Fernanda Marie MD     • acetaminophen  975 mg Oral Q6H PRN Fernanda Marie MD     • aluminum-magnesium hydroxide-simethicone  30 mL Oral Q4H PRN Fernanda Marie MD     • benztropine  1 mg Intramuscular BID PRN Fernanda Marie MD     • benztropine  1 mg Oral BID PRN Fernanda Marie MD     • hydrOXYzine HCL  50 mg Oral Q6H PRN Max 4/day Fernanda Marie MD      Or   • diphenhydrAMINE  50 mg Intramuscular Q6H PRN Fernanda Marie MD     • divalproex sodium  1,000 mg Oral Daily Fernanda Marie MD     • docusate sodium  100 mg Oral BID MARY Arzola     • ergocalciferol  50,000 Units Oral Weekly MARY Arzola     • glycerin-hypromellose-  1 drop Both Eyes Q3H PRN Fernanda Marie MD     • hydrocortisone   Topical 4x Daily PRN MARY Arzola     • hydrocortisone  25 mg Rectal BID PRN MARY Arzola     • hydrOXYzine HCL  100 mg Oral Q6H PRN Max 4/day Fernanda Marie MD      Or   • LORazepam  2 mg Intramuscular Q6H PRN Fernanda Marie MD     • hydrOXYzine HCL  25 mg Oral Q6H PRN Max 4/day Fernanda Marie MD     • melatonin  3 mg Oral HS Fernanda Marie MD     • OLANZapine  25 mg Oral HS Fernanda Marie MD     • OLANZapine  10 mg Oral Q3H PRN Max 3/day Fernanda Marie MD      Or   • OLANZapine  10 mg Intramuscular Q3H PRN Max 3/day Fernanda Marie MD     • OLANZapine  5 mg Oral Q3H PRN Max 6/day Fernanda Marie MD      Or   • OLANZapine  5 mg Intramuscular Q3H PRN Max 6/day Fernanda Marie MD     • OLANZapine  2.5 mg Oral Q3H PRN Max 8/day Fernanda Marie MD     • propranolol  10 mg Oral Q8H PRN Fernanda Marie MD     • senna  2 tablet Oral HS MARY Arzola     • sodium chloride  1 spray Each Nare Q1H PRN MARY Arzola     • traZODone  50 mg Oral HS PRN Jessenia Snyder MD         Behavioral Health Medications: all current active meds have been reviewed. Changes as in plan section above. Laboratory results:  I have personally reviewed all pertinent laboratory/tests results. No results found for this or any previous visit (from the past 48 hour(s)).      Troy Proctor MD

## 2023-09-07 NOTE — PLAN OF CARE
Problem: Alteration in Thoughts and Perception  Goal: Verbalize thoughts and feelings  Description: Interventions:  - Promote a nonjudgmental and trusting relationship with the patient through active listening and therapeutic communication  - Assess patient's level of functioning, behavior and potential for risk  - Engage patient in 1 on 1 interactions  - Encourage patient to express fears, feelings, frustrations, and discuss symptoms    - [place] patient to reality, help patient recognize reality-based thinking   - Administer medications as ordered and assess for potential side effects  - Provide the patient education related to the signs and symptoms of the illness and desired effects of prescribed medications  Outcome: Progressing  Goal: Agree to be compliant with medication regime, as prescribed and report medication side effects  Description: Interventions:  - Offer appropriate PRN medication and supervise ingestion; conduct AIMS, as needed   Outcome: Progressing  Goal: Attend and participate in unit activities, including therapeutic, recreational, and educational groups  Description: Interventions:  -Encourage Visitation and family involvement in care  Outcome: Progressing     Problem: Ineffective Coping  Goal: Cooperates with admission process  Description: Interventions:   - Complete admission process  Outcome: Progressing  Goal: Identifies ineffective coping skills  Outcome: Progressing  Goal: Identifies healthy coping skills  Outcome: Progressing     Problem: Risk for Self Injury/Neglect  Goal: Refrain from harming self  Description: Interventions:  - Monitor patient closely, per order  - Develop a trusting relationship  - Supervise medication ingestion, monitor effects and side effects   Outcome: Progressing     Problem: Anxiety  Goal: Anxiety is at manageable level  Description: Interventions:  - Assess and monitor patient's anxiety level.    - Monitor for signs and symptoms (heart palpitations, chest pain, shortness of breath, headaches, nausea, feeling jumpy, restlessness, irritable, apprehensive). - Collaborate with interdisciplinary team and initiate plan and interventions as ordered.   - [place] patient to unit/surroundings  - Explain treatment plan  - Encourage participation in care  - Encourage verbalization of concerns/fears  - Identify coping mechanisms  - Assist in developing anxiety-reducing skills  - Administer/offer alternative therapies  - Limit or eliminate stimulants  Outcome: Progressing     Problem: Risk for Violence/Aggression Toward Others  Goal: Control angry outbursts  Description: Interventions:  - Monitor patient closely, per order  - Ensure early verbal de-escalation  - Monitor prn medication needs  - Set reasonable/therapeutic limits, outline behavioral expectations, and consequences   - Provide a non-threatening milieu, utilizing the least restrictive interventions   Outcome: Progressing     Problem: Ineffective Coping  Goal: Participates in unit activities  Description: Interventions:  - Provide therapeutic environment   - Provide required programming   - Redirect inappropriate behaviors   Outcome: Progressing     Problem: Alteration in Thoughts and Perception  Goal: Verbalize thoughts and feelings  Description: Interventions:  - Promote a nonjudgmental and trusting relationship with the patient through active listening and therapeutic communication  - Assess patient's level of functioning, behavior and potential for risk  - Engage patient in 1 on 1 interactions  - Encourage patient to express fears, feelings, frustrations, and discuss symptoms    - [place] patient to reality, help patient recognize reality-based thinking   - Administer medications as ordered and assess for potential side effects  - Provide the patient education related to the signs and symptoms of the illness and desired effects of prescribed medications  Outcome: Progressing  Goal: Agree to be compliant with medication regime, as prescribed and report medication side effects  Description: Interventions:  - Offer appropriate PRN medication and supervise ingestion; conduct AIMS, as needed   Outcome: Progressing  Goal: Attend and participate in unit activities, including therapeutic, recreational, and educational groups  Description: Interventions:  -Encourage Visitation and family involvement in care  Outcome: Progressing     Problem: Ineffective Coping  Goal: Cooperates with admission process  Description: Interventions:   - Complete admission process  Outcome: Progressing  Goal: Identifies ineffective coping skills  Outcome: Progressing  Goal: Identifies healthy coping skills  Outcome: Progressing     Problem: Risk for Self Injury/Neglect  Goal: Refrain from harming self  Description: Interventions:  - Monitor patient closely, per order  - Develop a trusting relationship  - Supervise medication ingestion, monitor effects and side effects   Outcome: Progressing     Problem: Anxiety  Goal: Anxiety is at manageable level  Description: Interventions:  - Assess and monitor patient's anxiety level. - Monitor for signs and symptoms (heart palpitations, chest pain, shortness of breath, headaches, nausea, feeling jumpy, restlessness, irritable, apprehensive). - Collaborate with interdisciplinary team and initiate plan and interventions as ordered.   - [place] patient to unit/surroundings  - Explain treatment plan  - Encourage participation in care  - Encourage verbalization of concerns/fears  - Identify coping mechanisms  - Assist in developing anxiety-reducing skills  - Administer/offer alternative therapies  - Limit or eliminate stimulants  Outcome: Progressing     Problem: Risk for Violence/Aggression Toward Others  Goal: Control angry outbursts  Description: Interventions:  - Monitor patient closely, per order  - Ensure early verbal de-escalation  - Monitor prn medication needs  - Set reasonable/therapeutic limits, outline behavioral expectations, and consequences   - Provide a non-threatening milieu, utilizing the least restrictive interventions   Outcome: Progressing     Problem: Ineffective Coping  Goal: Participates in unit activities  Description: Interventions:  - Provide therapeutic environment   - Provide required programming   - Redirect inappropriate behaviors   Outcome: Progressing

## 2023-09-07 NOTE — NURSING NOTE
PT visible, cooperative and calm interacting with selected peers. PT denies SI/HI/VH/AH, compliant with HS no PRNs needed.

## 2023-09-07 NOTE — PROGRESS NOTES
09/07/23 0851   Team Meeting   Meeting Type Daily Rounds   Team Members Present   Team Members Present Physician;Nurse;   Physician Team Member [id number] [facility] Team [facility] Team Member Gladis   Patient/Family Present   Patient Present No   Patient's Family Present No   Pt pleasant, calm, cooperative. VPA labs to be drawn tomorrow morning. Pt med/meal compliant. Discharge possible Monday or Tuesday.

## 2023-09-07 NOTE — TREATMENT TEAM
09/07/23 0927   Activity/Group Checklist   Group Community meeting   Attendance Attended   Attendance Duration (min) 16-30   Interactions Interacted appropriately   Affect/Mood Appropriate   Goals Achieved Able to listen to others; Able to engage in interactions; Able to self-disclose; Able to recieve feedback

## 2023-09-07 NOTE — PROGRESS NOTES
[facility] Group Note     09/07/23 1415   Activity/Group Checklist   Group Life Skills  (Values and Perspectives)   Attendance Attended   Attendance Duration (min) 16-30  (arrived late to group)   Interactions Interacted appropriately  (but more reserved than previous encounters)   Affect/Mood Appropriate;Calm  (Attentive; Focused)   Goals Achieved Displayed empathy;Able to listen to others; Able to engage in interactions; Able to reflect/comment on own behavior;Able to recieve feedback; Able to give feedback to another

## 2023-09-07 NOTE — TREATMENT TEAM
09/07/23 1300   Activity/Group Checklist   Group   (recovery group- emotional regulations)   Attendance Attended   Attendance Duration (min) 46-60   Interactions Interacted appropriately   Affect/Mood Appropriate   Goals Achieved Discussed coping strategies; Discussed self-esteem issues; Able to listen to others; Able to engage in interactions; Able to reflect/comment on own behavior;Able to self-disclose; Able to recieve feedback

## 2023-09-07 NOTE — NURSING NOTE
Pt is pleasant and cooperative. Social with select peers. Med and meal compliant. Denies SI/HI/VH/AH. Denies any unmet needs or complaints at this time.

## 2023-09-07 NOTE — PROGRESS NOTES
09/07/23 1000   Activity/Group Checklist   Group Other (Comment)  (Group Art Therapy/Psychodynamic, Creatively Exploring the [place] and Concepts through Quotes with Discussion)   Attendance Attended   Attendance Duration (min) Greater than 60   Interactions Interacted appropriately   Affect/Mood Appropriate   Goals Achieved Discussed coping strategies; Able to listen to others; Able to engage in interactions; Able to recieve feedback; Able to give feedback to another  (Able to engage materials and directive; full participation)

## 2023-09-08 LAB — VALPROATE SERPL-MCNC: 76 UG/ML (ref 50–100)

## 2023-09-08 PROCEDURE — 80164 ASSAY DIPROPYLACETIC ACD TOT: CPT | Performed by: PSYCHIATRY & NEUROLOGY

## 2023-09-08 PROCEDURE — 99232 SBSQ HOSP IP/OBS MODERATE 35: CPT | Performed by: PSYCHIATRY & NEUROLOGY

## 2023-09-08 RX ORDER — OLANZAPINE 5 MG/1
5 TABLET ORAL
Qty: 30 TABLET | Refills: 1 | Status: SHIPPED | OUTPATIENT
Start: 2023-09-08 | End: 2023-11-07

## 2023-09-08 RX ORDER — DIVALPROEX SODIUM 500 MG/1
1000 TABLET, EXTENDED RELEASE ORAL DAILY
Qty: 60 TABLET | Refills: 1 | Status: SHIPPED | OUTPATIENT
Start: 2023-09-12 | End: 2023-11-11

## 2023-09-08 RX ORDER — OLANZAPINE 20 MG/1
20 TABLET ORAL
Qty: 30 TABLET | Refills: 1 | Status: SHIPPED | OUTPATIENT
Start: 2023-09-08 | End: 2023-11-07

## 2023-09-08 RX ORDER — OLANZAPINE 5 MG/1
25 TABLET, ORALLY DISINTEGRATING ORAL
Qty: 150 TABLET | Refills: 1 | Status: CANCELLED | OUTPATIENT
Start: 2023-09-08 | End: 2023-11-07

## 2023-09-08 RX ADMIN — OLANZAPINE 25 MG: 10 TABLET, ORALLY DISINTEGRATING ORAL at 21:07

## 2023-09-08 RX ADMIN — Medication 3 MG: at 21:07

## 2023-09-08 RX ADMIN — DOCUSATE SODIUM 100 MG: 100 CAPSULE, LIQUID FILLED ORAL at 17:13

## 2023-09-08 RX ADMIN — DIVALPROEX SODIUM 1000 MG: 500 TABLET, FILM COATED, EXTENDED RELEASE ORAL at 09:08

## 2023-09-08 RX ADMIN — DOCUSATE SODIUM 100 MG: 100 CAPSULE, LIQUID FILLED ORAL at 09:08

## 2023-09-08 RX ADMIN — SENNOSIDES 17.2 MG: 8.6 TABLET, FILM COATED ORAL at 21:07

## 2023-09-08 NOTE — SOCIAL WORK
LILLIE spoke with pt regarding discharge plans for Monday. Pt states she lost her medication that was provided during previous admission. LILLIE advised MD of same. Pt will be provided with indigent meds. Pt will need to follow up with [facility] for liability assessment upon discharge. Pt was referred during previous admission and d/t no-show, pt will need to call to schedule after discharge. Pt verbalized understanding of same.

## 2023-09-08 NOTE — DISCHARGE INSTR - APPOINTMENTS
Juan Pablo Stephens or Mary, our Kat and Kodi, will be calling you after your discharge, on the phone number that you provided. They will be available as an additional support, if needed. If you wish to speak with one of them, you may contact Juan Pablo Stephens at [phone] or Sravanthi Richardson at [phone].

## 2023-09-08 NOTE — NURSING NOTE
Patient visible on unit and attended groups. She denies all signs and symptoms. She is concerned about her rent money being due. She has been med/meal compliant and pleasant.

## 2023-09-08 NOTE — PLAN OF CARE
Problem: Alteration in Thoughts and Perception  Goal: Verbalize thoughts and feelings  Description: Interventions:  - Promote a nonjudgmental and trusting relationship with the patient through active listening and therapeutic communication  - Assess patient's level of functioning, behavior and potential for risk  - Engage patient in 1 on 1 interactions  - Encourage patient to express fears, feelings, frustrations, and discuss symptoms    - [place] patient to reality, help patient recognize reality-based thinking   - Administer medications as ordered and assess for potential side effects  - Provide the patient education related to the signs and symptoms of the illness and desired effects of prescribed medications  Outcome: Progressing  Goal: Agree to be compliant with medication regime, as prescribed and report medication side effects  Description: Interventions:  - Offer appropriate PRN medication and supervise ingestion; conduct AIMS, as needed   Outcome: Progressing  Goal: Attend and participate in unit activities, including therapeutic, recreational, and educational groups  Description: Interventions:  -Encourage Visitation and family involvement in care  Outcome: Progressing     Problem: Ineffective Coping  Goal: Cooperates with admission process  Description: Interventions:   - Complete admission process  Outcome: Progressing  Goal: Identifies ineffective coping skills  Outcome: Progressing  Goal: Identifies healthy coping skills  Outcome: Progressing     Problem: Anxiety  Goal: Anxiety is at manageable level  Description: Interventions:  - Assess and monitor patient's anxiety level. - Monitor for signs and symptoms (heart palpitations, chest pain, shortness of breath, headaches, nausea, feeling jumpy, restlessness, irritable, apprehensive). - Collaborate with interdisciplinary team and initiate plan and interventions as ordered.   - [place] patient to unit/surroundings  - Explain treatment plan  - Encourage participation in care  - Encourage verbalization of concerns/fears  - Identify coping mechanisms  - Assist in developing anxiety-reducing skills  - Administer/offer alternative therapies  - Limit or eliminate stimulants  Outcome: Progressing     Problem: Risk for Violence/Aggression Toward Others  Goal: Control angry outbursts  Description: Interventions:  - Monitor patient closely, per order  - Ensure early verbal de-escalation  - Monitor prn medication needs  - Set reasonable/therapeutic limits, outline behavioral expectations, and consequences   - Provide a non-threatening milieu, utilizing the least restrictive interventions   Outcome: Progressing

## 2023-09-08 NOTE — TREATMENT TEAM
09/08/23 0920   Activity/Group Checklist   Group Community meeting   Attendance Attended   Attendance Duration (min) 16-30   Interactions Interacted appropriately   Affect/Mood Appropriate   Goals Achieved Able to listen to others; Able to engage in interactions; Able to self-disclose; Able to recieve feedback

## 2023-09-08 NOTE — PROGRESS NOTES
Met with Osmar Lynn completed her relapse prevention in preparation for her discharge next week. She was able to identify her symptoms, warning signs, coping skills and support group. We reviewed the community supports.

## 2023-09-08 NOTE — PROGRESS NOTES
09/08/23 0927   Team Meeting   Meeting Type Daily Rounds   Team Members Present   Team Members Present Physician;Nurse;   Physician Team Member [id number] Team Member Gladis   Patient/Family Present   Patient Present No   Patient's Family Present No     Pt is med/meal compliant. Pt denying symptoms. VPA level 76. Discharge Monday.

## 2023-09-08 NOTE — DISCHARGE INSTR - OTHER ORDERS
CRISIS INFORMATION  Mental Health Matters! Help is available. Please call. Mental Health Crisis Intervention and Suicide Prevention Hot Line: Dial 988  [facility] Crisis number: 180-255-9177 or toll free: [phone] - 24-Hour Crisis & Emergency Services  Telephone Crisis Intervention is available 24 hours a day 7 days a week. Mobile Crisis Intervention is available to be dispatched to the three [facility] during certain times and can be requested at the same numbers. There is also a crisis residence available for those who need that level of care.   [employer]:  015298    Drug and Alcohol Services  For information on how to access Drug and Alcohol treatment services please contact:  After hours 24/7 number:  [facility] at [phone]  During regular business hours call:  [facility]  [address] [facility], [address] [place] [address]  Phone: [phone]    [facility] ([address])  [address] [facility], [address]  Phone: [phone]  [address] [facility], [address] [place] [address] Dr  New Admissions - [phone]  Local Office - [phone]   [address]  Phone [phone]

## 2023-09-08 NOTE — TREATMENT TEAM
09/08/23 1300   Activity/Group Checklist   Group   (recovery group-gratitude)   Attendance Attended   Attendance Duration (min) 46-60   Interactions Interacted appropriately   Affect/Mood Appropriate   Goals Achieved Discussed coping strategies; Discussed self-esteem issues; Able to listen to others; Able to engage in interactions; Able to reflect/comment on own behavior;Able to self-disclose; Able to recieve feedback; Able to give feedback to another

## 2023-09-08 NOTE — PROGRESS NOTES
09/08/23 1000   Activity/Group Checklist   Group Other (Comment)  (OPEN STUDIO Art Therapy/Social Group)   Attendance Attended   Attendance Duration (min) Greater than 60   Interactions Interacted appropriately   Affect/Mood Appropriate   Goals Achieved Able to listen to others; Able to engage in interactions

## 2023-09-08 NOTE — PROGRESS NOTES
Progress Note - Behavioral Health   Adria Hannah 28 y.o. female MRN: [id number]  Unit/Bed#: U 350-01 Encounter: [id number]    Assessment/Plan   Principal Problem:    Psychosis ([address]) rule out schizophrenia versus schizoaffective disorder  Active Problems:    Medical clearance for psychiatric admission    Mild tetrahydrocannabinol (THC) abuse      Recommended Treatment:   No psychopharmacological changes indicated at this time, continue with current regimen outlined below:   A) Continue Zyprexa 25 mg daily for psychosis   B) Continue Depakote ER 1000 mg daily for mood stabilization     - Depakote level on 9/8/23: 76, no further adjustments indicated at this time    Continue to measure blood pressures and restrict salt and fluid intake   Continue with group therapy, milieu therapy and occupational therapy. Obtain collateral information as indicated  Continue frequent safety checks and vitals per unit protocol. Case discussed with treatment team.  Risks, benefits and possible side effects of Medications: Risks, benefits, and possible side effects of medications have been explained to the patient, who verbalizes understanding    Current Legal Status: 201  Disposition: Discharge likely Monday back to home      ~~~~~~~~~~~~~~~~~~~~~~~~~~~~~~~~~~~~~~~~~~    Subjective: Per nursing report, Cherry Love has been calm and cooperative on the unit as well as compliant with her scheduled medications. She did not receive as needed medications over the last 24 hours. Cherry Love was seen and evaluated for continuity of care. She reports that her mood is "good, better than yesterday" because she feels less tired. She reported adequate sleep and slept about 8 hours. She has an  appropriate level of appetite. She does continue to display some paranoia that the police are investigating her and that someone broke into and destroyed her apartment.  However, she was able to agree that her goals upon discharge will be to talk to her merid about this situation and move forward so she can get her license from the [facility] and [facility]. Further, she stated that she has received some money from her ex-boyfriend, however she is unable to confirm that this is true. But, she appeared brighter and agreeable to stay in behavioral control over the weekend and plan for discharge on Monday. Today, Jose Ramon Dixon was able to contract for safety on the unit and denies active or passive suicidal or homicidal ideations. She denied auditory or visual hallucinations. Progress Toward Goals: slow improvement    Psychiatric Review of Systems:  Behavior over the last 24 hours: improved  Sleep: normal  Appetite: adequate  Medication side effects: none verbalized  ROS: Complete review of systems is negative except as noted above.     Vital signs in last 24 hours:  Temp:  [96.5 °F (35.8 °C)] 96.5 °F (35.8 °C)  HR:  [91] 91  Resp:  [16] 16  BP: (117)/(56) 117/56    Mental Status Exam:  Appearance:  alert, good eye contact, appears stated age, casually dressed and appropriate grooming and hygiene   Behavior:  calm, cooperative and sitting comfortably   Motor: no abnormal movements and normal gait and balance   Speech:  spontaneous, normal rate, normal volume and coherent   Mood:  "good, better than yesterday"   Affect:  mood-congruent and euthymic   Thought Process:  generally linear and goal-directed but perseverative at times regarding obtaining her license, cell phone, ex-boyfriend, and getting a job   Thought Content: no verbalized delusions delusions, paranoid thinking of that the  are investigating her and someone broke into her apartment   Perceptual disturbances: no reported hallucinations and does not appear to be responding to internal stimuli at this time   Risk Potential: No active or passive suicidal or homicidal ideation was verbalized during interview   Cognition: oriented to self and situation, appears to be of average intelligence and cognition not formally tested   Insight:  Improving   Judgment: Improving     Current Medications:  Current Facility-Administered Medications   Medication Dose Route Frequency Provider Last Rate   • acetaminophen  650 mg Oral Q6H PRN Tenisha Hunter MD     • acetaminophen  650 mg Oral Q4H PRN Tenisha Hunter MD     • acetaminophen  975 mg Oral Q6H PRN Tenisha Hunter MD     • aluminum-magnesium hydroxide-simethicone  30 mL Oral Q4H PRN Tenisha Hunter MD     • benztropine  1 mg Intramuscular BID PRN Tenisha Hunter MD     • benztropine  1 mg Oral BID PRN Tenisha Hunter MD     • hydrOXYzine HCL  50 mg Oral Q6H PRN Max 4/day Tenisha Hunter MD      Or   • diphenhydrAMINE  50 mg Intramuscular Q6H PRN Tenisha Hunter MD     • divalproex sodium  1,000 mg Oral Daily Tenisha Hunter MD     • docusate sodium  100 mg Oral BID MARY Arzola     • ergocalciferol  50,000 Units Oral Weekly MARY Arzola     • glycerin-hypromellose-  1 drop Both Eyes Q3H PRN Tenisha Hunter MD     • hydrocortisone   Topical 4x Daily PRN MARY Arzola     • hydrocortisone  25 mg Rectal BID PRN MARY Arzola     • hydrOXYzine HCL  100 mg Oral Q6H PRN Max 4/day Tenisha Hunter MD      Or   • LORazepam  2 mg Intramuscular Q6H PRN Tenisha Hunter MD     • hydrOXYzine HCL  25 mg Oral Q6H PRN Max 4/day Tenisha Hunter MD     • melatonin  3 mg Oral HS Tenisha Hunter MD     • OLANZapine  25 mg Oral HS Tenisha Hunter MD     • OLANZapine  10 mg Oral Q3H PRN Max 3/day Tenisha Hunter MD      Or   • OLANZapine  10 mg Intramuscular Q3H PRN Max 3/day Tenisha Hunter MD     • OLANZapine  5 mg Oral Q3H PRN Max 6/day Tenisha Hunter MD      Or   • OLANZapine  5 mg Intramuscular Q3H PRN Max 6/day Tenisha Hunter MD     • OLANZapine  2.5 mg Oral Q3H PRN Max 8/day Tenisha Hunter MD     • propranolol  10 mg Oral Q8H PRN Tenisha Hunter MD     • senna  2 tablet Oral HS Zoraida MARY Singleton     • sodium chloride  1 spray Each Nare Q1H PRN MARY Arzola     • traZODone  50 mg Oral HS PRN Geraldo Ascencio MD         Behavioral Health Medications: all current active meds have been reviewed. Changes as in plan section above. Laboratory results:  I have personally reviewed all pertinent laboratory/tests results.   Recent Results (from the past 48 hour(s))   Valproic acid level, total    Collection Time: 09/08/23  8:06 AM   Result Value Ref Range    Valproic Acid, Total 76 50 - 100 ug/mL        Jon Cross, DO  Psychiatry, PGY-1

## 2023-09-08 NOTE — BH TRANSITION RECORD
Contact Information: If you have any questions, concerns, pended studies, tests and/or procedures, or emergencies regarding your inpatient behavioral health visit. Please contact [facility] behavioral health unit 3B [phone] and ask to speak to a , nurse or physician. A contact is available 24 hours/ 7 days a week at this number. Summary of Procedures Performed During your Stay:  Below is a list of major procedures performed during your hospital stay and a summary of results:  - Cardiac Procedures/Studies: EKG. EKG on 9/3/23  Normal sinus rhythm  Normal ECG    If studies are pending at discharge, follow up with your PCP and/or referring provider.

## 2023-09-08 NOTE — NURSING NOTE
PT cooperative, calm and quiet,interacting with selected peers. PT denies SI/HI/VH/AH,  No pain/discomfort reported. Compliant with HS meds, no PRNs needed.

## 2023-09-09 PROCEDURE — 99232 SBSQ HOSP IP/OBS MODERATE 35: CPT | Performed by: PSYCHIATRY & NEUROLOGY

## 2023-09-09 RX ADMIN — ERGOCALCIFEROL 50000 UNITS: 1.25 CAPSULE ORAL at 08:48

## 2023-09-09 RX ADMIN — DIVALPROEX SODIUM 1000 MG: 500 TABLET, FILM COATED, EXTENDED RELEASE ORAL at 08:48

## 2023-09-09 RX ADMIN — DOCUSATE SODIUM 100 MG: 100 CAPSULE, LIQUID FILLED ORAL at 17:21

## 2023-09-09 RX ADMIN — OLANZAPINE 25 MG: 10 TABLET, ORALLY DISINTEGRATING ORAL at 21:07

## 2023-09-09 RX ADMIN — Medication 3 MG: at 21:08

## 2023-09-09 RX ADMIN — SENNOSIDES 17.2 MG: 8.6 TABLET, FILM COATED ORAL at 21:07

## 2023-09-09 RX ADMIN — DOCUSATE SODIUM 100 MG: 100 CAPSULE, LIQUID FILLED ORAL at 08:48

## 2023-09-09 NOTE — PLAN OF CARE
Problem: Alteration in Thoughts and Perception  Goal: Verbalize thoughts and feelings  Description: Interventions:  - Promote a nonjudgmental and trusting relationship with the patient through active listening and therapeutic communication  - Assess patient's level of functioning, behavior and potential for risk  - Engage patient in 1 on 1 interactions  - Encourage patient to express fears, feelings, frustrations, and discuss symptoms    - Joaquin patient to reality, help patient recognize reality-based thinking   - Administer medications as ordered and assess for potential side effects  - Provide the patient education related to the signs and symptoms of the illness and desired effects of prescribed medications  Outcome: Progressing  Goal: Agree to be compliant with medication regime, as prescribed and report medication side effects  Description: Interventions:  - Offer appropriate PRN medication and supervise ingestion; conduct AIMS, as needed   Outcome: Progressing  Goal: Attend and participate in unit activities, including therapeutic, recreational, and educational groups  Description: Interventions:  -Encourage Visitation and family involvement in care  Outcome: Progressing     Problem: Ineffective Coping  Goal: Cooperates with admission process  Description: Interventions:   - Complete admission process  Outcome: Progressing  Goal: Identifies ineffective coping skills  Outcome: Progressing  Goal: Identifies healthy coping skills  Outcome: Progressing     Problem: Risk for Self Injury/Neglect  Goal: Refrain from harming self  Description: Interventions:  - Monitor patient closely, per order  - Develop a trusting relationship  - Supervise medication ingestion, monitor effects and side effects   Outcome: Progressing     Problem: Anxiety  Goal: Anxiety is at manageable level  Description: Interventions:  - Assess and monitor patient's anxiety level.    - Monitor for signs and symptoms (heart palpitations, chest pain, shortness of breath, headaches, nausea, feeling jumpy, restlessness, irritable, apprehensive). - Collaborate with interdisciplinary team and initiate plan and interventions as ordered.   - Silva patient to unit/surroundings  - Explain treatment plan  - Encourage participation in care  - Encourage verbalization of concerns/fears  - Identify coping mechanisms  - Assist in developing anxiety-reducing skills  - Administer/offer alternative therapies  - Limit or eliminate stimulants  Outcome: Progressing     Problem: Risk for Violence/Aggression Toward Others  Goal: Control angry outbursts  Description: Interventions:  - Monitor patient closely, per order  - Ensure early verbal de-escalation  - Monitor prn medication needs  - Set reasonable/therapeutic limits, outline behavioral expectations, and consequences   - Provide a non-threatening milieu, utilizing the least restrictive interventions   Outcome: Progressing     Problem: Ineffective Coping  Goal: Participates in unit activities  Description: Interventions:  - Provide therapeutic environment   - Provide required programming   - Redirect inappropriate behaviors   Outcome: Progressing     Problem: DISCHARGE PLANNING  Goal: Discharge to home or other facility with appropriate resources  Description: INTERVENTIONS:  - Identify barriers to discharge w/patient and caregiver  - Arrange for needed discharge resources and transportation as appropriate  - Identify discharge learning needs (meds, wound care, etc.)  - Arrange for interpretive services to assist at discharge as needed  - Refer to Case Management Department for coordinating discharge planning if the patient needs post-hospital services based on physician/advanced practitioner order or complex needs related to functional status, cognitive ability, or social support system  Outcome: Progressing

## 2023-09-09 NOTE — NURSING NOTE
Patient has been visible in the milieu. She denies S.I.H.I. A/H V/H and she was cooperative with HS medication. She is anticipating discharge next week.

## 2023-09-09 NOTE — NURSING NOTE
Patient visible on unit and attended groups. She denies all signs and symptoms. She has been med/meal compliant and pleasant.

## 2023-09-09 NOTE — PROGRESS NOTES
Progress Note - Behavioral Health   Dianne Reed 28 y.o. female MRN: [id number]  Unit/Bed#: [facility] 350-01 Encounter: [id number]    Assessment/Plan   Principal Problem:    Psychosis ([address]) rule out schizophrenia versus schizoaffective disorder  Active Problems:    Medical clearance for psychiatric admission    Mild tetrahydrocannabinol (THC) abuse      Behavior over the last 24 hours:  Improving   Sleep: normal  Appetite: normal  Medication side effects: No  ROS: sedation        Mental Status Evaluation:  Appearance:  age appropriate and casually dressed   Behavior:  cooperative   Speech:  normal pitch and normal volume   Mood:  constricted   Affect:  constricted   Thought Process:  goal directed and logical   Associations: intact associations   Thought Content:  normal   Perceptual Disturbances: None   Risk Potential: Suicidal Ideations none  Homicidal Ideations none  Potential for Aggression No   Sensorium:  person, place and time/date   Memory:  recent and remote memory grossly intact   Consciousness:  alert and awake    Attention: attention span and concentration were age appropriate   Insight:  limited   Judgment: limited   Gait/Station: normal gait/station and normal balance   Motor Activity: no abnormal movements     Progress Toward Goals: Patient remains compliant with medications. She reported daytime sedation after latest dose adjustment on her bedtime Olanzapine dose. She stated she is gradually adjusting to the changes. According to staff report she remains cooperative with care and attends groups. No behavior issues reported. Depakote level was noted to be 76. Patient agrees to continue with her current medications as prescribed. She denies SI/HI/AVH and denies any plan or intent to harm herself or others. Plan for discharge early next week. Recommended Treatment: Continue with group therapy, milieu therapy and occupational therapy.   No psychopharmacological changes indicated at this time, continue with current regimen outlined below:              A) Continue Zyprexa 25 mg daily for psychosis              B) Continue Depakote ER 1000 mg daily for mood stabilization                           - Depakote level on 9/8/23: 76, no further adjustments indicated at this time     Continue to measure blood pressures and restrict salt and fluid intake   Continue with group therapy, milieu therapy and occupational therapy. Obtain collateral information as indicated  Continue frequent safety checks and vitals per unit protocol. Case discussed with treatment team.  Risks, benefits and possible side effects of Medications: Risks, benefits, and possible side effects of medications have been explained to the patient, who verbalizes understanding     Current Legal Status: 201  Disposition: Discharge likely Monday back to home    Risks, benefits and possible side effects of Medications:   Risks, benefits, and possible side effects of medications explained to patient and patient verbalizes understanding.       Medications:   all current active meds have been reviewed, continue current psychiatric medications and current meds:   Current Facility-Administered Medications   Medication Dose Route Frequency   • acetaminophen (TYLENOL) tablet 650 mg  650 mg Oral Q6H PRN   • acetaminophen (TYLENOL) tablet 650 mg  650 mg Oral Q4H PRN   • acetaminophen (TYLENOL) tablet 975 mg  975 mg Oral Q6H PRN   • aluminum-magnesium hydroxide-simethicone (MAALOX) oral suspension 30 mL  30 mL Oral Q4H PRN   • benztropine (COGENTIN) injection 1 mg  1 mg Intramuscular BID PRN   • benztropine (COGENTIN) tablet 1 mg  1 mg Oral BID PRN   • hydrOXYzine HCL (ATARAX) tablet 50 mg  50 mg Oral Q6H PRN Max 4/day    Or   • diphenhydrAMINE (BENADRYL) injection 50 mg  50 mg Intramuscular Q6H PRN   • divalproex sodium (DEPAKOTE ER) 24 hr tablet 1,000 mg  1,000 mg Oral Daily   • docusate sodium (COLACE) capsule 100 mg  100 mg Oral BID   • ergocalciferol (VITAMIN D2) capsule 50,000 Units  50,000 Units Oral Weekly   • glycerin-hypromellose- (ARTIFICIAL TEARS) ophthalmic solution 1 drop  1 drop Both Eyes Q3H PRN   • hydrocortisone (ANUSOL-HC) 2.5 % rectal cream   Topical 4x Daily PRN   • hydrocortisone (ANUSOL-HC) rectal suppository 25 mg  25 mg Rectal BID PRN   • hydrOXYzine HCL (ATARAX) tablet 100 mg  100 mg Oral Q6H PRN Max 4/day    Or   • LORazepam (ATIVAN) injection 2 mg  2 mg Intramuscular Q6H PRN   • hydrOXYzine HCL (ATARAX) tablet 25 mg  25 mg Oral Q6H PRN Max 4/day   • melatonin tablet 3 mg  3 mg Oral HS   • OLANZapine (ZyPREXA ZYDIS) dispersible tablet 25 mg  25 mg Oral HS   • OLANZapine (ZyPREXA) tablet 10 mg  10 mg Oral Q3H PRN Max 3/day    Or   • OLANZapine (ZyPREXA) IM injection 10 mg  10 mg Intramuscular Q3H PRN Max 3/day   • OLANZapine (ZyPREXA) tablet 5 mg  5 mg Oral Q3H PRN Max 6/day    Or   • OLANZapine (ZyPREXA) IM injection 5 mg  5 mg Intramuscular Q3H PRN Max 6/day   • OLANZapine (ZyPREXA) tablet 2.5 mg  2.5 mg Oral Q3H PRN Max 8/day   • propranolol (INDERAL) tablet 10 mg  10 mg Oral Q8H PRN   • senna (SENOKOT) tablet 17.2 mg  2 tablet Oral HS   • sodium chloride (OCEAN) 0.65 % nasal spray 1 spray  1 spray Each Nare Q1H PRN   • traZODone (DESYREL) tablet 50 mg  50 mg Oral HS PRN   . Labs: I have personally reviewed all pertinent laboratory/tests results.    Most Recent Labs:   Lab Results   Component Value Date    WBC 3.86 (L) 09/02/2023    RBC 5.08 09/02/2023    HGB 13.6 09/02/2023    HCT 43.2 09/02/2023     09/02/2023    RDW 13.4 09/02/2023    NEUTROABS 1.89 09/02/2023    SODIUM 137 09/02/2023    K 4.1 09/02/2023     09/02/2023    CO2 28 09/02/2023    BUN 13 09/02/2023    CREATININE 0.75 09/02/2023    GLUC 91 09/02/2023    GLUF 91 09/02/2023    CALCIUM 8.9 09/02/2023    AST 22 09/02/2023    ALT 34 09/02/2023    ALKPHOS 42 09/02/2023    TP 6.7 09/02/2023    ALB 4.2 09/02/2023    TBILI 0.58 09/02/2023    CHOLESTEROL 212 (H) 09/02/2023 HDL 57 09/02/2023    TRIG 87 09/02/2023    LDLCALC 138 (H) 09/02/2023    [address] 155 09/02/2023    VALPROICTOT 76 09/08/2023    [id number] 1.998 09/02/2023       Counseling / Coordination of Care  Total floor / unit time spent today n/a minutes. Greater than 50% of total time was spent with the patient and / or family counseling and / or coordination of care.  A description of the counseling / coordination of care:

## 2023-09-10 VITALS
HEIGHT: 65 IN | OXYGEN SATURATION: 98 % | RESPIRATION RATE: 16 BRPM | SYSTOLIC BLOOD PRESSURE: 97 MMHG | DIASTOLIC BLOOD PRESSURE: 56 MMHG | WEIGHT: 166.8 LBS | BODY MASS INDEX: 27.79 KG/M2 | HEART RATE: 82 BPM | TEMPERATURE: 97.4 F

## 2023-09-10 PROCEDURE — 99232 SBSQ HOSP IP/OBS MODERATE 35: CPT | Performed by: PSYCHIATRY & NEUROLOGY

## 2023-09-10 RX ADMIN — DIVALPROEX SODIUM 1000 MG: 500 TABLET, FILM COATED, EXTENDED RELEASE ORAL at 09:36

## 2023-09-10 RX ADMIN — OLANZAPINE 25 MG: 10 TABLET, ORALLY DISINTEGRATING ORAL at 21:22

## 2023-09-10 RX ADMIN — DOCUSATE SODIUM 100 MG: 100 CAPSULE, LIQUID FILLED ORAL at 17:38

## 2023-09-10 RX ADMIN — Medication 3 MG: at 21:22

## 2023-09-10 RX ADMIN — DOCUSATE SODIUM 100 MG: 100 CAPSULE, LIQUID FILLED ORAL at 09:36

## 2023-09-10 RX ADMIN — TRAZODONE HYDROCHLORIDE 50 MG: 50 TABLET ORAL at 21:24

## 2023-09-10 RX ADMIN — SENNOSIDES 17.2 MG: 8.6 TABLET, FILM COATED ORAL at 21:22

## 2023-09-10 NOTE — PROGRESS NOTES
[facility] Group Note     09/10/23 1400   Activity/Group Checklist   Group Life Skills  (Teamwork and Communication)   Attendance Attended   Attendance Duration (min) Greater than 60  (left group for a time, but did return)   Interactions Interacted appropriately  (but did not participate in activity; only talked to select peers)   Affect/Mood Appropriate;Calm  (Attentive)   Goals Achieved Discussed coping strategies; Able to listen to others; Able to engage in interactions; Able to reflect/comment on own behavior;Able to recieve feedback; Able to give feedback to another

## 2023-09-10 NOTE — PROGRESS NOTES
Progress Note - Behavioral Health   Emy Heart 28 y.o. female MRN: [id number]  Unit/Bed#: [facility] 350-01 Encounter: [id number]    Assessment/Plan   Principal Problem:    Psychosis ([address]) rule out schizophrenia versus schizoaffective disorder  Active Problems:    Medical clearance for psychiatric admission    Mild tetrahydrocannabinol (THC) abuse      Behavior over the last 24 hours:  improved  Sleep: normal  Appetite: normal  Medication side effects: No  ROS: no complaints        Mental Status Evaluation:  Appearance:  age appropriate and casually dressed   Behavior:  cooperative   Speech:  normal pitch and normal volume   Mood:  constricted   Affect:  constricted   Thought Process:  goal directed and logical   Associations: intact associations   Thought Content:  normal   Perceptual Disturbances: None   Risk Potential: Suicidal Ideations none  Homicidal Ideations none  Potential for Aggression No   Sensorium:  person, place and time/date   Memory:  recent and remote memory grossly intact   Consciousness:  alert and awake    Attention: attention span and concentration were age appropriate   Insight:  limited   Judgment: limited   Gait/Station: normal gait/station and normal balance   Motor Activity: no abnormal movements     Progress Toward Goals: Patient remains compliant with medications and denies side effects. According to staff report she is  visible on unit and attended groups. She is cooperative with care. She is looking forward to be discharged and stated she was able to borrow money to pay her landlord but she still need to appear in court on 9/12. She will benefit from case management services upon discharge since she plans to move and she needs assistance with that. Agrees to continue current treatment . Recommended Treatment: Continue with group therapy, milieu therapy and occupational therapy.   No psychopharmacological changes indicated at this time, continue with current regimen outlined below:              A) Continue Zyprexa 25 mg daily for psychosis              B) Continue Depakote ER 1000 mg daily for mood stabilization                           - Depakote level on 9/8/23: 76, no further adjustments indicated at this time     Continue to measure blood pressures and restrict salt and fluid intake   Continue with group therapy, milieu therapy and occupational therapy.    Obtain collateral information as indicated  Continue frequent safety checks and vitals per unit protocol. Case discussed with treatment team.  Risks, benefits and possible side effects of Medications: Risks, benefits, and possible side effects of medications have been explained to the patient, who verbalizes understanding     Current Legal Status: 201  Disposition: Discharge likely Monday back to home    Risks, benefits and possible side effects of Medications:   Risks, benefits, and possible side effects of medications explained to patient and patient verbalizes understanding.       Medications:   all current active meds have been reviewed, continue current psychiatric medications and current meds:   Current Facility-Administered Medications   Medication Dose Route Frequency   • acetaminophen (TYLENOL) tablet 650 mg  650 mg Oral Q6H PRN   • acetaminophen (TYLENOL) tablet 650 mg  650 mg Oral Q4H PRN   • acetaminophen (TYLENOL) tablet 975 mg  975 mg Oral Q6H PRN   • aluminum-magnesium hydroxide-simethicone (MAALOX) oral suspension 30 mL  30 mL Oral Q4H PRN   • benztropine (COGENTIN) injection 1 mg  1 mg Intramuscular BID PRN   • benztropine (COGENTIN) tablet 1 mg  1 mg Oral BID PRN   • hydrOXYzine HCL (ATARAX) tablet 50 mg  50 mg Oral Q6H PRN Max 4/day    Or   • diphenhydrAMINE (BENADRYL) injection 50 mg  50 mg Intramuscular Q6H PRN   • divalproex sodium (DEPAKOTE ER) 24 hr tablet 1,000 mg  1,000 mg Oral Daily   • docusate sodium (COLACE) capsule 100 mg  100 mg Oral BID   • ergocalciferol (VITAMIN D2) capsule 50,000 Units  50,000 Units Oral Weekly   • glycerin-hypromellose- (ARTIFICIAL TEARS) ophthalmic solution 1 drop  1 drop Both Eyes Q3H PRN   • hydrocortisone (ANUSOL-HC) 2.5 % rectal cream   Topical 4x Daily PRN   • hydrocortisone (ANUSOL-HC) rectal suppository 25 mg  25 mg Rectal BID PRN   • hydrOXYzine HCL (ATARAX) tablet 100 mg  100 mg Oral Q6H PRN Max 4/day    Or   • LORazepam (ATIVAN) injection 2 mg  2 mg Intramuscular Q6H PRN   • hydrOXYzine HCL (ATARAX) tablet 25 mg  25 mg Oral Q6H PRN Max 4/day   • melatonin tablet 3 mg  3 mg Oral HS   • OLANZapine (ZyPREXA ZYDIS) dispersible tablet 25 mg  25 mg Oral HS   • OLANZapine (ZyPREXA) tablet 10 mg  10 mg Oral Q3H PRN Max 3/day    Or   • OLANZapine (ZyPREXA) IM injection 10 mg  10 mg Intramuscular Q3H PRN Max 3/day   • OLANZapine (ZyPREXA) tablet 5 mg  5 mg Oral Q3H PRN Max 6/day    Or   • OLANZapine (ZyPREXA) IM injection 5 mg  5 mg Intramuscular Q3H PRN Max 6/day   • OLANZapine (ZyPREXA) tablet 2.5 mg  2.5 mg Oral Q3H PRN Max 8/day   • propranolol (INDERAL) tablet 10 mg  10 mg Oral Q8H PRN   • senna (SENOKOT) tablet 17.2 mg  2 tablet Oral HS   • sodium chloride (OCEAN) 0.65 % nasal spray 1 spray  1 spray Each Nare Q1H PRN   • traZODone (DESYREL) tablet 50 mg  50 mg Oral HS PRN   . Labs: I have personally reviewed all pertinent laboratory/tests results.    Most Recent Labs:   Lab Results   Component Value Date    WBC 3.86 (L) 09/02/2023    RBC 5.08 09/02/2023    HGB 13.6 09/02/2023    HCT 43.2 09/02/2023     09/02/2023    RDW 13.4 09/02/2023    NEUTROABS 1.89 09/02/2023    SODIUM 137 09/02/2023    K 4.1 09/02/2023     09/02/2023    CO2 28 09/02/2023    BUN 13 09/02/2023    CREATININE 0.75 09/02/2023    GLUC 91 09/02/2023    GLUF 91 09/02/2023    CALCIUM 8.9 09/02/2023    AST 22 09/02/2023    ALT 34 09/02/2023    ALKPHOS 42 09/02/2023    TP 6.7 09/02/2023    ALB 4.2 09/02/2023    TBILI 0.58 09/02/2023    CHOLESTEROL 212 (H) 09/02/2023    HDL 57 09/02/2023    TRIG 87 09/02/2023    LDLCALC 138 (H) 09/02/2023    [address] 09/02/2023    VALPROICTOT 76 09/08/2023    HIQ1EIMADVXE 1.998 09/02/2023       Counseling / Coordination of Care  Total floor / unit time spent today n/a minutes. Greater than 50% of total time was spent with the patient and / or family counseling and / or coordination of care.  A description of the counseling / coordination of care:

## 2023-09-10 NOTE — PLAN OF CARE
Problem: Alteration in Thoughts and Perception  Goal: Verbalize thoughts and feelings  Description: Interventions:  - Promote a nonjudgmental and trusting relationship with the patient through active listening and therapeutic communication  - Assess patient's level of functioning, behavior and potential for risk  - Engage patient in 1 on 1 interactions  - Encourage patient to express fears, feelings, frustrations, and discuss symptoms    - [place] patient to reality, help patient recognize reality-based thinking   - Administer medications as ordered and assess for potential side effects  - Provide the patient education related to the signs and symptoms of the illness and desired effects of prescribed medications  Outcome: Progressing  Goal: Agree to be compliant with medication regime, as prescribed and report medication side effects  Description: Interventions:  - Offer appropriate PRN medication and supervise ingestion; conduct AIMS, as needed   Outcome: Progressing  Goal: Attend and participate in unit activities, including therapeutic, recreational, and educational groups  Description: Interventions:  -Encourage Visitation and family involvement in care  Outcome: Progressing     Problem: Ineffective Coping  Goal: Cooperates with admission process  Description: Interventions:   - Complete admission process  Outcome: Progressing  Goal: Identifies ineffective coping skills  Outcome: Progressing  Goal: Identifies healthy coping skills  Outcome: Progressing     Problem: Risk for Self Injury/Neglect  Goal: Refrain from harming self  Description: Interventions:  - Monitor patient closely, per order  - Develop a trusting relationship  - Supervise medication ingestion, monitor effects and side effects   Outcome: Progressing     Problem: Anxiety  Goal: Anxiety is at manageable level  Description: Interventions:  - Assess and monitor patient's anxiety level.    - Monitor for signs and symptoms (heart palpitations, chest pain, shortness of breath, headaches, nausea, feeling jumpy, restlessness, irritable, apprehensive). - Collaborate with interdisciplinary team and initiate plan and interventions as ordered.   - [place] patient to unit/surroundings  - Explain treatment plan  - Encourage participation in care  - Encourage verbalization of concerns/fears  - Identify coping mechanisms  - Assist in developing anxiety-reducing skills  - Administer/offer alternative therapies  - Limit or eliminate stimulants  Outcome: Progressing     Problem: Risk for Violence/Aggression Toward Others  Goal: Control angry outbursts  Description: Interventions:  - Monitor patient closely, per order  - Ensure early verbal de-escalation  - Monitor prn medication needs  - Set reasonable/therapeutic limits, outline behavioral expectations, and consequences   - Provide a non-threatening milieu, utilizing the least restrictive interventions   Outcome: Progressing     Problem: Ineffective Coping  Goal: Participates in unit activities  Description: Interventions:  - Provide therapeutic environment   - Provide required programming   - Redirect inappropriate behaviors   Outcome: Progressing     Problem: DISCHARGE PLANNING  Goal: Discharge to home or other facility with appropriate resources  Description: INTERVENTIONS:  - Identify barriers to discharge w/patient and caregiver  - Arrange for needed discharge resources and transportation as appropriate  - Identify discharge learning needs (meds, wound care, etc.)  - Arrange for interpretive services to assist at discharge as needed  - Refer to Case Management Department for coordinating discharge planning if the patient needs post-hospital services based on physician/advanced practitioner order or complex needs related to functional status, cognitive ability, or social support system  Outcome: Progressing

## 2023-09-11 PROCEDURE — 99238 HOSP IP/OBS DSCHRG MGMT 30/<: CPT | Performed by: PSYCHIATRY & NEUROLOGY

## 2023-09-11 RX ORDER — LANOLIN ALCOHOL/MO/W.PET/CERES
3 CREAM (GRAM) TOPICAL
Qty: 30 TABLET | Refills: 0 | Status: SHIPPED | OUTPATIENT
Start: 2023-09-11 | End: 2023-10-11

## 2023-09-11 RX ADMIN — DIVALPROEX SODIUM 1000 MG: 500 TABLET, FILM COATED, EXTENDED RELEASE ORAL at 08:56

## 2023-09-11 RX ADMIN — DOCUSATE SODIUM 100 MG: 100 CAPSULE, LIQUID FILLED ORAL at 08:56

## 2023-09-11 NOTE — PLAN OF CARE
Problem: Alteration in Thoughts and Perception  Goal: Verbalize thoughts and feelings  Description: Interventions:  - Promote a nonjudgmental and trusting relationship with the patient through active listening and therapeutic communication  - Assess patient's level of functioning, behavior and potential for risk  - Engage patient in 1 on 1 interactions  - Encourage patient to express fears, feelings, frustrations, and discuss symptoms    - [place] patient to reality, help patient recognize reality-based thinking   - Administer medications as ordered and assess for potential side effects  - Provide the patient education related to the signs and symptoms of the illness and desired effects of prescribed medications  Outcome: Adequate for Discharge  Goal: Agree to be compliant with medication regime, as prescribed and report medication side effects  Description: Interventions:  - Offer appropriate PRN medication and supervise ingestion; conduct AIMS, as needed   Outcome: Adequate for Discharge  Goal: Attend and participate in unit activities, including therapeutic, recreational, and educational groups  Description: Interventions:  -Encourage Visitation and family involvement in care  Outcome: Adequate for Discharge     Problem: Ineffective Coping  Goal: Cooperates with admission process  Description: Interventions:   - Complete admission process  Outcome: Adequate for Discharge  Goal: Identifies ineffective coping skills  Outcome: Adequate for Discharge  Goal: Identifies healthy coping skills  Outcome: Adequate for Discharge     Problem: Risk for Self Injury/Neglect  Goal: Refrain from harming self  Description: Interventions:  - Monitor patient closely, per order  - Develop a trusting relationship  - Supervise medication ingestion, monitor effects and side effects   Outcome: Adequate for Discharge     Problem: Anxiety  Goal: Anxiety is at manageable level  Description: Interventions:  - Assess and monitor patient's anxiety level. - Monitor for signs and symptoms (heart palpitations, chest pain, shortness of breath, headaches, nausea, feeling jumpy, restlessness, irritable, apprehensive). - Collaborate with interdisciplinary team and initiate plan and interventions as ordered.   - [place] patient to unit/surroundings  - Explain treatment plan  - Encourage participation in care  - Encourage verbalization of concerns/fears  - Identify coping mechanisms  - Assist in developing anxiety-reducing skills  - Administer/offer alternative therapies  - Limit or eliminate stimulants  Outcome: Adequate for Discharge     Problem: Risk for Violence/Aggression Toward Others  Goal: Control angry outbursts  Description: Interventions:  - Monitor patient closely, per order  - Ensure early verbal de-escalation  - Monitor prn medication needs  - Set reasonable/therapeutic limits, outline behavioral expectations, and consequences   - Provide a non-threatening milieu, utilizing the least restrictive interventions   Outcome: Adequate for Discharge     Problem: DISCHARGE PLANNING  Goal: Discharge to home or other facility with appropriate resources  Description: INTERVENTIONS:  - Identify barriers to discharge w/patient and caregiver  - Arrange for needed discharge resources and transportation as appropriate  - Identify discharge learning needs (meds, wound care, etc.)  - Arrange for interpretive services to assist at discharge as needed  - Refer to Case Management Department for coordinating discharge planning if the patient needs post-hospital services based on physician/advanced practitioner order or complex needs related to functional status, cognitive ability, or social support system  Outcome: Adequate for Discharge     Problem: Ineffective Coping  Goal: Participates in unit activities  Description: Interventions:  - Provide therapeutic environment   - Provide required programming   - Redirect inappropriate behaviors   Outcome: Adequate for Discharge

## 2023-09-11 NOTE — SOCIAL WORK
Pt to D/C today. Pt denies SI/HI/AVH. Pt oriented x3. Pt to d/c to her apartment via [address]. Pt to follow up with [facility] and d/t recent no-show, will need to call to schedule appointment day of. Pt discharged with medication in hand.       Discharge Address: [address] [place] [address], [address] [place] [address] [address]  Phone: [phone]

## 2023-09-11 NOTE — TREATMENT TEAM
09/11/23 0927   Activity/Group Checklist   Group Community meeting   Attendance Attended   Attendance Duration (min) 16-30   Interactions Interacted appropriately   Affect/Mood Appropriate   Goals Achieved Able to listen to others; Able to engage in interactions; Able to self-disclose; Able to recieve feedback

## 2023-09-11 NOTE — NURSING NOTE
Patient is quiet and pleasant but is largely keeping to herself. She interacts with staff  Easily and is calm and pleasant. She denies S.I.H.I. A/H V/H. She was cooperative with HS medications and at her request she received 50mgs po prn Trazadone for insomnia at 2124. Trazadone prn was effective as she is now asleep.

## 2023-09-11 NOTE — PLAN OF CARE
Problem: Alteration in Thoughts and Perception  Goal: Verbalize thoughts and feelings  Description: Interventions:  - Promote a nonjudgmental and trusting relationship with the patient through active listening and therapeutic communication  - Assess patient's level of functioning, behavior and potential for risk  - Engage patient in 1 on 1 interactions  - Encourage patient to express fears, feelings, frustrations, and discuss symptoms    - [place] patient to reality, help patient recognize reality-based thinking   - Administer medications as ordered and assess for potential side effects  - Provide the patient education related to the signs and symptoms of the illness and desired effects of prescribed medications  Outcome: Progressing  Goal: Agree to be compliant with medication regime, as prescribed and report medication side effects  Description: Interventions:  - Offer appropriate PRN medication and supervise ingestion; conduct AIMS, as needed   Outcome: Progressing  Goal: Attend and participate in unit activities, including therapeutic, recreational, and educational groups  Description: Interventions:  -Encourage Visitation and family involvement in care  Outcome: Progressing     Problem: Ineffective Coping  Goal: Cooperates with admission process  Description: Interventions:   - Complete admission process  Outcome: Progressing  Goal: Identifies ineffective coping skills  Outcome: Progressing  Goal: Identifies healthy coping skills  Outcome: Progressing     Problem: Risk for Self Injury/Neglect  Goal: Refrain from harming self  Description: Interventions:  - Monitor patient closely, per order  - Develop a trusting relationship  - Supervise medication ingestion, monitor effects and side effects   Outcome: Progressing     Problem: Anxiety  Goal: Anxiety is at manageable level  Description: Interventions:  - Assess and monitor patient's anxiety level.    - Monitor for signs and symptoms (heart palpitations, chest pain, shortness of breath, headaches, nausea, feeling jumpy, restlessness, irritable, apprehensive). - Collaborate with interdisciplinary team and initiate plan and interventions as ordered.   - [place] patient to unit/surroundings  - Explain treatment plan  - Encourage participation in care  - Encourage verbalization of concerns/fears  - Identify coping mechanisms  - Assist in developing anxiety-reducing skills  - Administer/offer alternative therapies  - Limit or eliminate stimulants  Outcome: Progressing     Problem: Risk for Violence/Aggression Toward Others  Goal: Control angry outbursts  Description: Interventions:  - Monitor patient closely, per order  - Ensure early verbal de-escalation  - Monitor prn medication needs  - Set reasonable/therapeutic limits, outline behavioral expectations, and consequences   - Provide a non-threatening milieu, utilizing the least restrictive interventions   Outcome: Progressing     Problem: Ineffective Coping  Goal: Participates in unit activities  Description: Interventions:  - Provide therapeutic environment   - Provide required programming   - Redirect inappropriate behaviors   Outcome: Progressing     Problem: DISCHARGE PLANNING  Goal: Discharge to home or other facility with appropriate resources  Description: INTERVENTIONS:  - Identify barriers to discharge w/patient and caregiver  - Arrange for needed discharge resources and transportation as appropriate  - Identify discharge learning needs (meds, wound care, etc.)  - Arrange for interpretive services to assist at discharge as needed  - Refer to Case Management Department for coordinating discharge planning if the patient needs post-hospital services based on physician/advanced practitioner order or complex needs related to functional status, cognitive ability, or social support system  Outcome: Progressing

## 2023-09-11 NOTE — NURSING NOTE
Pt is visible on the unit and social with peers. Medication and meal compliant. Pleasant and excited for upcoming discharge. Denies SI/HI/AH/VH at this time. AVS gone over with pt and pt verbalized understanding and intentions to take meds and attend follow ups as ordered. Pt left unit with belongings and prescribed medications at her side. Discharged to home and picked up by elizabeth from hospital lobby.
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
## 2023-09-11 NOTE — PROGRESS NOTES
09/11/23 0846   Team Meeting   Meeting Type Daily Rounds   Team Members Present   Team Members Present Physician;Nurse;   Physician Team Member [facility] Team Member [facility] Management Team Member Gladis   Patient/Family Present   Patient Present No   Patient's Family Present No   Discharge today.